# Patient Record
Sex: MALE | Race: WHITE | URBAN - METROPOLITAN AREA
[De-identification: names, ages, dates, MRNs, and addresses within clinical notes are randomized per-mention and may not be internally consistent; named-entity substitution may affect disease eponyms.]

---

## 2017-04-26 ENCOUNTER — HOSPITAL ENCOUNTER (EMERGENCY)
Facility: CLINIC | Age: 32
Discharge: HOME | End: 2017-04-26
Attending: FAMILY MEDICINE | Admitting: FAMILY MEDICINE
Payer: COMMERCIAL

## 2017-04-26 VITALS
HEART RATE: 77 BPM | WEIGHT: 150.4 LBS | SYSTOLIC BLOOD PRESSURE: 140 MMHG | OXYGEN SATURATION: 97 % | DIASTOLIC BLOOD PRESSURE: 80 MMHG | RESPIRATION RATE: 16 BRPM | TEMPERATURE: 97.4 F

## 2017-04-26 DIAGNOSIS — F11.20 UNCOMPLICATED OPIOID DEPENDENCE (H): ICD-10-CM

## 2017-04-26 DIAGNOSIS — F41.9 ANXIETY: ICD-10-CM

## 2017-04-26 LAB
AMPHETAMINES UR QL SCN: NORMAL
BARBITURATES UR QL: NORMAL
BENZODIAZ UR QL: NORMAL
CANNABINOIDS UR QL SCN: NORMAL
COCAINE UR QL: NORMAL
ETHANOL UR QL SCN: NORMAL
OPIATES UR QL SCN: NORMAL

## 2017-04-26 PROCEDURE — 80320 DRUG SCREEN QUANTALCOHOLS: CPT | Performed by: EMERGENCY MEDICINE

## 2017-04-26 PROCEDURE — 99285 EMERGENCY DEPT VISIT HI MDM: CPT | Mod: 25

## 2017-04-26 PROCEDURE — 99284 EMERGENCY DEPT VISIT MOD MDM: CPT | Mod: Z6 | Performed by: FAMILY MEDICINE

## 2017-04-26 PROCEDURE — 80307 DRUG TEST PRSMV CHEM ANLYZR: CPT | Performed by: EMERGENCY MEDICINE

## 2017-04-26 PROCEDURE — 90791 PSYCH DIAGNOSTIC EVALUATION: CPT

## 2017-04-26 RX ORDER — BUPRENORPHINE AND NALOXONE 4; 1 MG/1; MG/1
1 FILM, SOLUBLE BUCCAL; SUBLINGUAL DAILY
COMMUNITY
End: 2017-09-14

## 2017-04-26 RX ORDER — CITALOPRAM HYDROBROMIDE 20 MG/1
TABLET ORAL
Qty: 30 TABLET | Refills: 0 | Status: SHIPPED | OUTPATIENT
Start: 2017-04-26 | End: 2017-09-14

## 2017-04-26 NOTE — DISCHARGE INSTRUCTIONS
Discharged to home starting Celexa as discussed plan to follow up with the outpatient MI/CD day treatment program they will contact you otherwise contact outpatient services at [phone]

## 2017-04-26 NOTE — ED NOTES
I have had anxiety my whole life but worse these last 3mo. Paranoia, having incredible anxiety, like everything I am doing is being scrutinized to the point I am faking my behaviors to please people and not being natural anymore. Scared of everything and everyone. On suboxone for opiate addiction. Pt calm and cooperative, appears anxious.

## 2017-04-26 NOTE — ED AVS SNAPSHOT
[facility], [place], Emergency Department    [address]    [facility] [address]    Phone:  [phone]    Fax:  [phone]                                       Regan Quarles   MRN: [id number]    Department:  [facility], Emergency Department   Date of Visit:  4/26/2017           After Visit Summary Signature Page     I have received my discharge instructions, and my questions have been answered. I have discussed any challenges I see with this plan with the nurse or doctor.    ..........................................................................................................................................  Patient/Patient Representative Signature      ..........................................................................................................................................  Patient Representative Print Name and Relationship to Patient    ..................................................               ................................................  Date                                            Time    ..........................................................................................................................................  Reviewed by Signature/Title    ...................................................              ..............................................  Date                                                            Time

## 2017-04-26 NOTE — ED AVS SNAPSHOT
[facility], Emergency Department    [address]    [place] [address]    Phone:  [phone]    Fax:  [phone]                                       Regan Quarles   MRN: [id number]    Department:  [facility], Emergency Department   Date of Visit:  4/26/2017           Patient Information     Date Of Birth          1985        Your diagnoses for this visit were:     Anxiety     Uncomplicated opioid dependence (H)        You were seen by Stephan Brooks MD.      Follow-up Information     Follow up with Tejinder Wright.    Specialty:  Internal Medicine    Why:  As needed    Contact information:    [facility]  [address]  [facility] [address]  [phone]          Discharge Instructions       Discharged to home starting Celexa as discussed plan to follow up with the outpatient MI/CD day treatment program they will contact you otherwise contact outpatient services at [phone]    24 Hour Appointment Hotline       To make an appointment at any [facility] clinic, call [phone] ([phone]). If you don't have a family doctor or clinic, we will help you find one. [facility] clinics are conveniently located to serve the needs of you and your family.             Review of your medicines      START taking        Dose / Directions Last dose taken    citalopram 20 MG tablet   Commonly known as:  celeXA   Quantity:  30 tablet        Take 1/2 tablet (10 mg) for 1-2 weeks, then increase to 1 tablet orally daily   Refills:  0          Our records show that you are taking the medicines listed below. If these are incorrect, please call your family doctor or clinic.        Dose / Directions Last dose taken    buprenorphine HCl-naloxone HCl 4-1 MG per film   Commonly known as:  SUBOXONE   Dose:  1 Film        Place 1 Film under the tongue daily   Refills:  0        BUSPAR PO   Dose:  10 mg        Take 10 mg by mouth daily   Refills:  0                Prescriptions  "were sent or printed at these locations (1 Prescription)                   Other Prescriptions                Printed at Department/Unit printer (1 of 1)         citalopram (CELEXA) 20 MG tablet                Procedures and tests performed during your visit     Drug abuse screen 6 urine (tox)      Orders Needing Specimen Collection     None      Pending Results     No orders found from 2017 to 2017.            Pending Culture Results     No orders found from 2017 to 2017.            Thank you for choosing [facility]       Thank you for choosing [facility] for your care. Our goal is always to provide you with excellent care. Hearing back from our patients is one way we can continue to improve our services. Please take a few minutes to complete the written survey that you may receive in the mail after you visit with us. Thank you!        LocalEatshart Information     Synesis lets you send messages to your doctor, view your test results, renew your prescriptions, schedule appointments and more. To sign up, go to [email or web address] . Click on \"Log in\" on the left side of the screen, which will take you to the Welcome page. Then click on \"Sign up Now\" on the right side of the page.     You will be asked to enter the access code listed below, as well as some personal information. Please follow the directions to create your username and password.     Your access code is: [email or web address]  Expires: 2017  6:02 PM     Your access code will  in 90 days. If you need help or a new code, please call your [facility] clinic or [phone].        Care EveryWhere ID     This is your Care EveryWhere ID. This could be used by other organizations to access your [facility] medical records  [id number]        After Visit Summary       This is your record. Keep this with you and show to your community pharmacist(s) and doctor(s) at your next visit.                  "

## 2017-04-27 ENCOUNTER — TELEPHONE (OUTPATIENT)
Dept: BEHAVIORAL HEALTH | Facility: CLINIC | Age: 32
End: 2017-04-27

## 2017-04-27 NOTE — TELEPHONE ENCOUNTER
D: Pt was referred by ED/DEC. Unclear if referral was for DDP, PHP or Day Tx as all 3 were mentioned. Pt has anxiety disorder and SEVEN, currently sober on Suboxone maintenance.     I: Reviewed EHR. Contacted pt to discuss referral and further triage.    A: Pending. Pt plans to take leave from work initially and then may be able to work part time. Prefers DDP in terms of LOC and clinical focus.    P: Scheduled DA with José Manuel Tan for Tuesday 5/02/17 at 8:30 AM. Directions and Instructions provided for appt. Pt has writer's number and requested to call if he has further questions.    Quita Garcia, CHRISTINA, [facility]  4/27/2017

## 2017-04-30 ASSESSMENT — ENCOUNTER SYMPTOMS
NERVOUS/ANXIOUS: 1
ABDOMINAL PAIN: 0
SHORTNESS OF BREATH: 0
DYSPHORIC MOOD: 1
FEVER: 0

## 2017-05-01 ENCOUNTER — TELEPHONE (OUTPATIENT)
Dept: BEHAVIORAL HEALTH | Facility: CLINIC | Age: 32
End: 2017-05-01

## 2017-05-01 NOTE — TELEPHONE ENCOUNTER
D: Pt called to report that he has a couple of appts set up with an individual therapist and another appt with an Addictions Therapist. Pt wondered if he should try that and then if he is struggling, inquired if he could re-refer himsef to DDP.     I: Explained without an assessment it is difficult for writer to determine if less intensive Tx would provide him with what he needs right now. Encouraged patient to follow recommendations of his providers.     A: Cancelled DA for tomorrow per pt request.    P: Pt will call writer directly if he decides to pursue Tx in the future. Informed patient that he, and his providers, can call [facility] to make a referral too.    Quita Garcia, CHRISTINA, [facility]  5/1/2017

## 2017-05-01 NOTE — ED PROVIDER NOTES
History     Chief Complaint   Patient presents with     Paranoid     Anxiety     HPI  Regan Quarles is a 31 year old male who presents to emergency room with ongoing increasing anxiety patient was having lots of paranoid thoughts at work today has noted increased anxiety.  Patient states that he has had struggles with anxiety most of his life but has increased in intensity in the last 3 months and using Suboxone on a regular basis but notes that he has a history of opiate dependence he continues to use BuSpar on an as needed basis by his report.  Patient denies any suicidal ideation but states that he feels as though his anxiety seems to be increasing in its intensity.  Patient is not currently on any SSRIs.  Patient is open to the idea of outpatient treatment.    I have reviewed the Medications, Allergies, Past Medical and Surgical History, and Social History in the Epic system.    PERSONAL MEDICAL HISTORY  Past Medical History:   Diagnosis Date     Opioid dependence (H)      PAST SURGICAL HISTORY  History reviewed. No pertinent surgical history.  FAMILY HISTORY  No family history on file.  SOCIAL HISTORY  Social History   Substance Use Topics     Smoking status: Current Every Day Smoker     Smokeless tobacco: Not on file     Alcohol use Yes      Comment: Very occ     MEDICATIONS  No current facility-administered medications for this encounter.      Current Outpatient Prescriptions   Medication     buprenorphine HCl-naloxone HCl (SUBOXONE) 4-1 MG per film     BusPIRone HCl (BUSPAR PO)     citalopram (CELEXA) 20 MG tablet     ALLERGIES  Allergies   Allergen Reactions     Seasonal Allergies          Review of Systems   Constitutional: Negative for fever.   Respiratory: Negative for shortness of breath.    Cardiovascular: Negative for chest pain.   Gastrointestinal: Negative for abdominal pain.   Psychiatric/Behavioral: Positive for dysphoric mood. Negative for suicidal ideas. The patient is nervous/anxious.     All other systems reviewed and are negative.      Physical Exam   BP: 137/88  Pulse: 77  Heart Rate: 71  Temp: 97.4  F (36.3  C)  Resp: 16  Weight: 68.2 kg (150 lb 6.4 oz)  SpO2: 96 %  Physical Exam   Constitutional: He is oriented to person, place, and time. No distress.   HENT:   Head: Atraumatic.   Mouth/Throat: Oropharynx is clear and moist. No oropharyngeal exudate.   Eyes: Pupils are equal, round, and reactive to light. No scleral icterus.   Cardiovascular: Normal heart sounds and intact distal pulses.    Pulmonary/Chest: Breath sounds normal. No respiratory distress.   Abdominal: Soft. Bowel sounds are normal. There is no tenderness.   Musculoskeletal: He exhibits no edema or tenderness.   Neurological: He is alert and oriented to person, place, and time. He has normal reflexes. He displays normal reflexes. No cranial nerve deficit. He exhibits normal muscle tone. Coordination normal.   Skin: Skin is warm. No rash noted. He is not diaphoretic.   Psychiatric: His mood appears anxious. He exhibits a depressed mood. He expresses no suicidal ideation.       ED Course     ED Course     Procedures     Patient was seen by the  please refer to their report.      Critical Care time:  none     Labs Ordered and Resulted from Time of ED Arrival Up to the Time of Departure from the ED   DRUG ABUSE SCREEN 6 CHEM DEP URINE ([facility])         Assessments & Plan (with Medical Decision Making)         I have reviewed the nursing notes.    I have reviewed the findings, diagnosis, plan and need for follow up with the patient.  Patient with ongoing severe anxiety.  At this time he was seen and evaluated by the  please refer to the recommendation as well we discussed the possibility of starting her medications he will be started on Mrs. CASIMIRO Saleh he also is going to be referred to day treatment program for more prolonged evaluation and treatment patient understands the importance of follow-up and will return if he  has any thoughts of suicide or harming himself.    Discharge Medication List as of 4/26/2017  6:02 PM      START taking these medications    Details   citalopram (CELEXA) 20 MG tablet Take 1/2 tablet (10 mg) for 1-2 weeks, then increase to 1 tablet orally daily, Disp-30 tablet, R-0, Local Print             Final diagnoses:   Anxiety   Uncomplicated opioid dependence (H)       4/26/2017   [facility], [place], EMERGENCY DEPARTMENT     Stephan Brooks MD  04/30/17 2100

## 2017-06-27 ENCOUNTER — HOSPITAL ENCOUNTER (EMERGENCY)
Facility: CLINIC | Age: 32
Discharge: HOME | End: 2017-06-27
Attending: FAMILY MEDICINE | Admitting: FAMILY MEDICINE
Payer: COMMERCIAL

## 2017-06-27 VITALS
DIASTOLIC BLOOD PRESSURE: 81 MMHG | RESPIRATION RATE: 16 BRPM | SYSTOLIC BLOOD PRESSURE: 144 MMHG | HEART RATE: 96 BPM | WEIGHT: 154.25 LBS | OXYGEN SATURATION: 99 % | TEMPERATURE: 99.1 F

## 2017-06-27 DIAGNOSIS — F15.10 AMPHETAMINE ABUSE (H): ICD-10-CM

## 2017-06-27 DIAGNOSIS — F41.9 ANXIETY: ICD-10-CM

## 2017-06-27 LAB
AMPHETAMINES UR QL SCN: ABNORMAL
BARBITURATES UR QL: ABNORMAL
BENZODIAZ UR QL: ABNORMAL
CANNABINOIDS UR QL SCN: ABNORMAL
COCAINE UR QL: ABNORMAL
ETHANOL UR QL SCN: ABNORMAL
OPIATES UR QL SCN: ABNORMAL

## 2017-06-27 PROCEDURE — 80320 DRUG SCREEN QUANTALCOHOLS: CPT | Mod: 59 | Performed by: EMERGENCY MEDICINE

## 2017-06-27 PROCEDURE — 99285 EMERGENCY DEPT VISIT HI MDM: CPT | Mod: 25

## 2017-06-27 PROCEDURE — 90791 PSYCH DIAGNOSTIC EVALUATION: CPT

## 2017-06-27 PROCEDURE — 80307 DRUG TEST PRSMV CHEM ANLYZR: CPT | Performed by: EMERGENCY MEDICINE

## 2017-06-27 PROCEDURE — 99284 EMERGENCY DEPT VISIT MOD MDM: CPT | Mod: Z6 | Performed by: FAMILY MEDICINE

## 2017-06-27 NOTE — SUMMARY OF CARE
"Pt seeking clarification as to \"why am I here?  I don't even know how I got here or what grounds you have for keeping me here.\"    "

## 2017-06-27 NOTE — ED AVS SNAPSHOT
[facility], Emergency Department    [address]    [place] [address]    Phone:  [phone]    Fax:  [phone]                                       Regan Quarles   MRN: [id number]    Department:  [facility], Emergency Department   Date of Visit:  6/27/2017           Patient Information     Date Of Birth          1985        Your diagnoses for this visit were:     Anxiety     Amphetamine abuse        You were seen by Stephan Brooks MD.      Follow-up Information     Follow up with Tejinder Wright.    Specialty:  Internal Medicine    Why:  As needed    Contact information:    [facility]  [address]  [place] [address]  [phone]          Discharge Instructions       Discharged to home with plan to stop amphetamine use follow-up with your outpatient therapist continue with outpatient therapy at this time pursue MI/CD if not improving.    24 Hour Appointment Hotline       To make an appointment at any [facility] clinic, call [phone] ([phone]). If you don't have a family doctor or clinic, we will help you find one. [facility] clinics are conveniently located to serve the needs of you and your family.             Review of your medicines      Our records show that you are taking the medicines listed below. If these are incorrect, please call your family doctor or clinic.        Dose / Directions Last dose taken    buprenorphine HCl-naloxone HCl 4-1 MG per film   Commonly known as:  SUBOXONE   Dose:  1 Film        Place 1 Film under the tongue daily Reports taking 3mg   Refills:  0        BUSPAR PO   Dose:  40 mg        Take 40 mg by mouth daily   Refills:  0        citalopram 20 MG tablet   Commonly known as:  celeXA   Quantity:  30 tablet        Take 1/2 tablet (10 mg) for 1-2 weeks, then increase to 1 tablet orally daily   Refills:  0                Procedures and tests performed during your visit     Drug abuse screen 6 urine (tox)     "  Orders Needing Specimen Collection     None      Pending Results     No orders found from 2017 to 2017.            Pending Culture Results     No orders found from 2017 to 2017.            Pending Results Instructions     If you had any lab results that were not finalized at the time of your Discharge, you can call the ED Lab Result RN at [phone]. You will be contacted by this team for any positive Lab results or changes in treatment. The nurses are available 7 days a week from 10A to 6:30P.  You can leave a message 24 hours per day and they will return your call.        Thank you for choosing [facility]       Thank you for choosing [facility] for your care. Our goal is always to provide you with excellent care. Hearing back from our patients is one way we can continue to improve our services. Please take a few minutes to complete the written survey that you may receive in the mail after you visit with us. Thank you!        [email or web address] Information     [email or web address] lets you send messages to your doctor, view your test results, renew your prescriptions, schedule appointments and more. To sign up, go to [email or web address] . Click on \"Log in\" on the left side of the screen, which will take you to the Welcome page. Then click on \"Sign up Now\" on the right side of the page.     You will be asked to enter the access code listed below, as well as some personal information. Please follow the directions to create your username and password.     Your access code is: [email or web address]  Expires: 2017  6:02 PM     Your access code will  in 90 days. If you need help or a new code, please call your [facility] clinic or [phone].        Care EveryWhere ID     This is your Care EveryWhere ID. This could be used by other organizations to access your [facility] medical records  [id number]        Equal Access to Services     PEDRO BULLOCK AH: Geovani Hidalgo, dipesh guzmán, lidia chang " trudi fang ah. So [facility] [phone].    ATENCIÓN: Si habla español, tiene a harrison disposición servicios gratuitos de asistencia lingüística. Llame al [phone].    We comply with applicable federal civil rights laws and [place] laws. We do not discriminate on the basis of race, color, national origin, age, disability sex, sexual orientation or gender identity.            After Visit Summary       This is your record. Keep this with you and show to your community pharmacist(s) and doctor(s) at your next visit.

## 2017-06-27 NOTE — ED AVS SNAPSHOT
[facility], [place], Emergency Department    [address]    [facility] [address]    Phone:  [phone]    Fax:  [phone]                                       Regan Quarles   MRN: [id number]    Department:  [facility], Emergency Department   Date of Visit:  6/27/2017           After Visit Summary Signature Page     I have received my discharge instructions, and my questions have been answered. I have discussed any challenges I see with this plan with the nurse or doctor.    ..........................................................................................................................................  Patient/Patient Representative Signature      ..........................................................................................................................................  Patient Representative Print Name and Relationship to Patient    ..................................................               ................................................  Date                                            Time    ..........................................................................................................................................  Reviewed by Signature/Title    ...................................................              ..............................................  Date                                                            Time

## 2017-06-27 NOTE — ED NOTES
Bed: ED10  Expected date: 6/27/17  Expected time: 11:45 AM  Means of arrival:   Comments:  WBL  33yo M psych eval

## 2017-06-27 NOTE — ED PROVIDER NOTES
History     Chief Complaint   Patient presents with     Addiction Problem     mother called 911 for unusual behaviors, told police she thought he was using substances again, patient denies     Hallucinations     per police hold paper , has not slept in 2 days, having auditory and visual hallucinations     HPI  Regan Quarles is a 31 year old male who presents to emergency room with ongoing problems with increased unusual behaviors per family report.  Patient admits to having ongoing methamphetamine use at this time.  Past history includes opiate dependence and depression and anxiety patient is not interested in chemical dependency treatment at this time but is aware of the possibility of outpatient MI CD program.  Patient denies any suicidal ideation no immediate threat to himself or others.    I have reviewed the Medications, Allergies, Past Medical and Surgical History, and Social History in the Epic system.    Review of Systems   Constitutional: Negative for fever.   Respiratory: Negative for shortness of breath.    Cardiovascular: Negative for chest pain.   Gastrointestinal: Negative for abdominal pain.   Psychiatric/Behavioral: Positive for dysphoric mood. Negative for suicidal ideas. The patient is nervous/anxious.    All other systems reviewed and are negative.      Physical Exam   BP: (!) 130/98  Pulse: 124  Temp: 99.1  F (37.3  C)  Resp: 16  Weight: 70 kg (154 lb 4 oz)  SpO2: 94 %  Physical Exam   Constitutional: He is oriented to person, place, and time. No distress.   HENT:   Head: Atraumatic.   Mouth/Throat: Oropharynx is clear and moist. No oropharyngeal exudate.   Eyes: Pupils are equal, round, and reactive to light. No scleral icterus.   Cardiovascular: Normal heart sounds and intact distal pulses.    Pulmonary/Chest: Breath sounds normal. No respiratory distress.   Abdominal: Soft. Bowel sounds are normal. There is no tenderness.   Musculoskeletal: He exhibits no edema or tenderness.    Neurological: He is alert and oriented to person, place, and time. He has normal reflexes. No cranial nerve deficit. He exhibits normal muscle tone. Coordination normal.   Skin: Skin is warm. No rash noted. He is not diaphoretic.   Psychiatric: His mood appears anxious. He exhibits a depressed mood. He expresses no suicidal ideation.       ED Course     ED Course     Procedures     Patient was seen by the  please refer to their report in the notes section of the Epic chart dated 6/27/17      Critical Care time:  none      Labs Ordered and Resulted from Time of ED Arrival Up to the Time of Departure from the ED   DRUG ABUSE SCREEN 6 CHEM DEP URINE ([facility]) - Abnormal; Notable for the following:        Result Value    Amphetamine Qual Urine   (*)     Value: Positive   Cutoff for a positive amphetamine is greater than 500 ng/mL. This is an   unconfirmed screening result to be used for medical purposes only.      All other components within normal limits            Assessments & Plan (with Medical Decision Making)       I have reviewed the nursing notes.    I have reviewed the findings, diagnosis, plan and need for follow up with the patient.  Patient with ongoing anxiety as well as amphetamine abuse at this time will be discharged to home with plans to follow up with outpatient MI CD program or return if any increase in thoughts of harming self or others.    New Prescriptions    No medications on file       Final diagnoses:   Anxiety   Amphetamine abuse       6/27/2017   [facility], [place], EMERGENCY DEPARTMENT     Stephan Brooks MD  06/29/17 6890

## 2017-06-29 ENCOUNTER — TELEPHONE (OUTPATIENT)
Dept: BEHAVIORAL HEALTH | Facility: CLINIC | Age: 32
End: 2017-06-29

## 2017-06-29 ASSESSMENT — ENCOUNTER SYMPTOMS
DYSPHORIC MOOD: 1
SHORTNESS OF BREATH: 0
ABDOMINAL PAIN: 0
FEVER: 0
NERVOUS/ANXIOUS: 1

## 2017-06-29 NOTE — TELEPHONE ENCOUNTER
Received a fax and fule 25 from Anitha Glynn, [facility], [phone], without a prior phone call.  Looks like a MURIEL on the last page.  Referring to [facility]  Did not think withdrawal would be a concern for this client. Benefits requested.  Faxed to [facility]staff for refiew.

## 2017-07-10 NOTE — TELEPHONE ENCOUNTER
Pt calls for involvement in lodging.  Discussed with Linda/Irena how to proceed as pw was sent for screening on 6-29-17.  Linda requests full eval as she had multiple questions for screening.  Pt agrees, appt secured for 7-17-17 / [place]

## 2017-07-17 NOTE — TELEPHONE ENCOUNTER
Linda in cd evaluation recommending dual diagnosis day treatment.  See [facility] for clinical background.  Inbasket to program to schedule diagnostic evaluation

## 2017-07-17 NOTE — TELEPHONE ENCOUNTER
7/17/2017  I met briefly with pt who is interested in the Dual Diagnosis Program that is 5 days a week rather than a 3 day a week co-occurring program.  He was not interested in LP at this time. I cancelled our appointment and referred him to DDP for an assessment.

## 2017-07-19 ENCOUNTER — BEH TREATMENT PLAN (OUTPATIENT)
Dept: BEHAVIORAL HEALTH | Facility: CLINIC | Age: 32
End: 2017-07-19
Attending: PSYCHIATRY & NEUROLOGY

## 2017-07-19 ENCOUNTER — HOSPITAL ENCOUNTER (OUTPATIENT)
Dept: BEHAVIORAL HEALTH | Facility: CLINIC | Age: 32
Discharge: HOME | End: 2017-07-19
Attending: PSYCHIATRY & NEUROLOGY | Admitting: PSYCHIATRY & NEUROLOGY
Payer: COMMERCIAL

## 2017-07-19 PROCEDURE — 90791 PSYCH DIAGNOSTIC EVALUATION: CPT | Performed by: PSYCHOLOGIST

## 2017-07-19 ASSESSMENT — PAIN SCALES - GENERAL: PAINLEVEL: NO PAIN (0)

## 2017-07-19 NOTE — PROGRESS NOTES
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 reports he has no S/I, denies a hx of S/I. Pt reports he has \"a lot\" of trouble with concentration. He reports memory is OK. Pt reports he has been eating a lot. He has gained 5 pounds over the past 3 weeks. Pt reports he sleeps 9 hours a night.  Pt reports his energy is variable, low yesterday. Pt reports he lacks motivation. Pt reports irritability, feels tense, uneasy in crowds, impulsiveness, and worry.    Pt reports he has a physician following him for suboxone-Dr Lina IVY. He sees a therapist also. He denies having a psychiatrist. He has has stopped all of his mental health meds. He is open to recommendations to take other medications from our physician.     Pt denies cultural issues that would impact treatment. He denies medical concerns.     Pt reports his strengths are: compassionate, loyal, resourceful.     Onset/Duration/Pattern of Symptoms noted above: since mid teens     Regan reports the following understanding of his diagnosis: \"I have no official dx\"      Prognosis is Guarded. Without the recommended intervention, the client is likely to experience the following consequences of their symptoms: " Pt will need residential treatment without MICD IOP.    Referrals to services required by statute or rule:   Report to child/adult protection services was NA.     Program Recommendation: MI/CD Treatment (MICD) .      Assessment Completed by: Miguel Tan

## 2017-07-19 NOTE — PROGRESS NOTES
"Initial Individual Treatment Plan     Patient: Regan Quarles   MRN: [id number]  : 1985  Age: 32 year old  Sex: male    Diagnostic Assessment Date / Date of Initial Individual Treatment Plan: 17      Immediate Health Concerns:  No     Immediate Safety Concerns:  No    Identify the issues to be addressed in treatment:  Symptom Management, Community Resources/Discharge Planning, Abstinence/Relapse Prevention, Develop / Improve Independent Living Skills and Develop Socialization / Interpersonal Relationship Skills    Client Initial Individualized Goals for Treatment: \"hope to get some firm dx, work in direction to figure out what is motivating the problems in my life...why I keep doing the same cycles over and over.\"      Initial Treatment suggestions for the client during the time between Diagnostic Assessment and completion of the Individualized Treatment Plan:  Abstain from Substance Use   Ask for more information, support and/or assistance as needed.  Follow up with providers/community supports as needed:   Report increases or changes in symptoms to staff.  Report any personal safety concerns to staff.   Take medications as prescribed.  Report medication changes and/or side effects to staff.  Attend and participate in groups as scheduled or notify staff if unable to do so.  Report any use of substances to staff as this may impact your symptoms and/or  personal safety.  Notify staff if you have any other issues that need to be addressed. This may include  any current abuse / neglect / exploitation or other vulnerability.  Follow recommendations of your treatment team and discuss concerns if not in  agreement.     Treatment Team Responsible: MI/CD Treatment (MICD)      Therapeutic Interventions/Treatment Strategies may include:  Support, Redirection, Feedback, Limit/Boundaries, Safety Assessments, Structured Activity, Problem Solving, Clarification, Education, Motivational Enhancement and Relapse " Prevention as needed.    Miguel Tan        Admission Date: 7/25/2017    The Initial Individual Treatment Plan was reviewed with Regan Quarles upon admission.      Regan reported his last use of substances as: 2.5 weeks ago    Identify any current concerns with potential to impact admission:     withdrawal/intoxication: No concerns     medication/medical concerns: No concerns     immediate safety concerns: No concerns     Other: No other concerns    DIMENSION  ADMIT RATING   1 Acute Intoxication / Withdrawal Potential 0   2 Biomedical Conditions & Complications 0   3 Emotional / Behavioral / Cognitive Conditions & Complications 2   4 Treatment Acceptance / Resistance: 1   5 Continued Use / Relapse Prevention 3   6 Recovery Environment 2         Completed by: Miguel Tan MA [facility]

## 2017-07-19 NOTE — TELEPHONE ENCOUNTER
----- Message from Miguel Tan LP sent at 7/19/2017 10:16 AM CDT -----  Regarding: start MICD  Mary      Pt has a planned start in the 9am track of MICD IOP on 7/24/17.    Thank you,    Miguel Tan MA, LP [facility]

## 2017-07-19 NOTE — PROGRESS NOTES
Acknowledgement of Current Treatment Plan       I have reviewed my treatment plan with my therapist / counselor on 7/19/2017. I agree with the plan as it is written in the electronic health record.    Name Signature   Regan Quarles    Name of Therapist / Counselor    Miguel Tan MA [facility]

## 2017-07-24 ENCOUNTER — TELEPHONE (OUTPATIENT)
Dept: BEHAVIORAL HEALTH | Facility: CLINIC | Age: 32
End: 2017-07-24

## 2017-07-24 NOTE — TELEPHONE ENCOUNTER
Phoned pt, he reports transportation problem today, but reports he can get here daily the rest of the week.. He plans to start 7/25 9am track.

## 2017-07-25 ENCOUNTER — HOSPITAL ENCOUNTER (OUTPATIENT)
Dept: BEHAVIORAL HEALTH | Facility: CLINIC | Age: 32
End: 2017-07-25
Attending: PSYCHIATRY & NEUROLOGY
Payer: COMMERCIAL

## 2017-07-25 VITALS — WEIGHT: 155 LBS | BODY MASS INDEX: 23.49 KG/M2 | HEIGHT: 68 IN

## 2017-07-25 PROBLEM — F33.1 MAJOR DEPRESSIVE DISORDER, RECURRENT EPISODE, MODERATE (H): Status: ACTIVE | Noted: 2017-07-25

## 2017-07-25 PROCEDURE — H2012 BEHAV HLTH DAY TREAT, PER HR: HCPCS

## 2017-07-25 ASSESSMENT — ANXIETY QUESTIONNAIRES
IF YOU CHECKED OFF ANY PROBLEMS ON THIS QUESTIONNAIRE, HOW DIFFICULT HAVE THESE PROBLEMS MADE IT FOR YOU TO DO YOUR WORK, TAKE CARE OF THINGS AT HOME, OR GET ALONG WITH OTHER PEOPLE: VERY DIFFICULT
7. FEELING AFRAID AS IF SOMETHING AWFUL MIGHT HAPPEN: SEVERAL DAYS
1. FEELING NERVOUS, ANXIOUS, OR ON EDGE: NEARLY EVERY DAY
3. WORRYING TOO MUCH ABOUT DIFFERENT THINGS: NEARLY EVERY DAY
2. NOT BEING ABLE TO STOP OR CONTROL WORRYING: SEVERAL DAYS
6. BECOMING EASILY ANNOYED OR IRRITABLE: NEARLY EVERY DAY
GAD7 TOTAL SCORE: 15
5. BEING SO RESTLESS THAT IT IS HARD TO SIT STILL: MORE THAN HALF THE DAYS

## 2017-07-25 ASSESSMENT — PATIENT HEALTH QUESTIONNAIRE - PHQ9: 5. POOR APPETITE OR OVEREATING: MORE THAN HALF THE DAYS

## 2017-07-25 NOTE — PROGRESS NOTES
"MICD Progress Note / Treatment Plan Review       Patient: Regan Quarles   MRN: [id number]  : 1985  Age: 32 year old  Sex: male    Week of: 17-17    Client Initial Individualized Goals for Treatment:  \"hope to get some firm dx, work in direction to figure out what is motivating the problems in my life...why I keep doing the same cycles over and over.\"     See Initial Treatment suggestions for the client during the time between Diagnostic Assessment and completion of the Master Individualized Treatment Plan.    Treatment Goals:  Treatment plan will be formulated on 17.      Active Psychiatric Symptoms Impairing:   Thought, Mood, Behavior and Perception    Area of Treatment Focus:  Symptom Management, Personal Safety, Community Resources/Discharge Planning and Abstinence/Relapse Prevention    Treatment Strategies:   Support, Feedback, Limit/Boundaries, Safety Assessments, Structured Activity, Problem Solving, Clarification, Education, Motivational Enhancement Therapy and Cognitive Behavioral Therapy    Patient Response:  Listened, Attentive and Alert    Dimension Risk Description and Outcome:    Dimension One: Acute Intoxication/Withdrawal Potential   Daily Note:  17:  Rajinder reports that he has not used chemicals in a little over 2 weeks.  (RAJNI)    Dimension Two: Biomedical Conditions and Complications  Daily Note: 17:  No physical concerns reported.  (RAJNI)    Dimension Three: Emotional/Behavioral / Cognitive Conditions & Complications  Daily Note: 17:  Rajinder introduced himself to the group and explained that he is feeling \"uncomfortable, but hopeful\" about being in treatment.  He states that he would just like to observe psychotherapy as he feels \"nervous\" about being here.  He denies thoughts to self harm.  (RAJNI)    Dimension Four: Treatment Acceptance / Resistance  Daily Note: 17:  First day of treatment.  Minimally participated in the group discussion.  (RAJNI)    Dimension " Five: Continued Use/Relapse Prevention  Daily Note: 7/25/17: High risk for relapse.  (RAJNI)    Dimension Six: Recovery Environment  Daily Note: 7/25/17:  Minimal sober support in the community. (RAJNI)      Weekly Progress / Treatment Plan Review      Are there additional progress notes for this week? Yes (see additional progress notes)    Are Treatment Plan Goals being addressed? Treatment plan has not yet been formulated.      Are treatment plan strategies to address goals effective? N/A    Are there any current contracts in place? No    Client: N/A     Client: N/A    Was client case reviewed with Dr Burch and/or Dr Garg and the treatment team this week? no    Staff: CHRISTINA Cardoso(RAJNI)

## 2017-07-25 NOTE — PROGRESS NOTES
RN Review of Medical History / Physical Health Screen  Outpatient Behavioral Programs      CLIENT'S NAME: Regan Quarles  MRN:   [id number]  :   1985 AGE:32 year old SEX: male    DATE OF DIAGNOSTIC ASSESSMENT: 17  DATE OF ADMISSION: 17   PROGRAM: MI/CD Treatment (MICD)      Following admission, the RN reviewed the following:    - Medical History / Physical Health Screen completed during the DA noted above.  - Immediate Health Concerns as noted on the Initial Individual Treatment Plan.    Client height and weight recorded by RN in epic: yes    BMI Review:  Was the patient informed of BMI? yes      Findings Normal, No Intervention       RN Recommendations include: Continue to educate on nutrition and exercise. Educate on the importance to monitor regularly and if increase continues follow up with primary provider.        Boy Jordan  2017

## 2017-07-25 NOTE — PROGRESS NOTES
"  Adult Mental Health Outpatient Group Therapy Progress Note     Client Initial Individualized Goals for Treatment: \"hope to get some firm dx, work in direction to figure out what is motivating the problems in my life...why I keep doing the same cycles over and over.\"        See Initial Treatment suggestions for the client during the time between Diagnostic Assessment and completion of the Master Individualized Treatment Plan.    Treatment Goals:     see above     Area of Treatment Focus:  Symptom Management and Abstinence/Relapse Prevention    Therapeutic Interventions/Treatment Strategies:  Support, Feedback, Structured Activity, Problem Solving, Clarification and Education    Response to Treatment Strategies:  Accepted Feedback, Listened, Focused on Goals, Attentive and Accepted Support    Name of Group:  Self Support Skills     Description and Outcome:  Rajinder participated in stress management topic of negative and positive coping skills. It was his first group, starting the program this date. He easily shared and noted some examples of negative skills that he uses for management and what the \"high cost\" is for himself. He was able to utilized a list of positive coping skills to identify a few skills he has been trying to use and several new ones that he would like to add to his repertoire.    Is this a Weekly Review of the Progress on the Treatment Plan?  No          "

## 2017-07-25 NOTE — PROGRESS NOTES
"Adult Mental Health   Dual Diagnosis Program   Progress Note     Client Initial Individualized Goals for Treatment:  \"hope to get some firm dx, work in direction to figure out what is motivating the problems in my life...why I keep doing the same cycles over and over.\"       See Initial Treatment suggestions for the client during the time between Diagnostic Assessment and completion of the Master Individualized Treatment Plan.    Treatment Goals:     Abstain from Substance Use   Ask for more information, support and/or assistance as needed.  Follow up with providers/community supports as needed:   Report increases or changes in symptoms to staff.  Report any personal safety concerns to staff.   Take medications as prescribed.  Report medication changes and/or side effects to staff.  Attend and participate in groups as scheduled or notify staff if unable to do so.  Report any use of substances to staff as this may impact your symptoms and/or                      personal safety.  Notify staff if you have any other issues that need to be addressed. This may include              any current abuse / neglect / exploitation or other vulnerability.  Follow recommendations of your treatment team and discuss concerns if not in            agreement.       Area of Treatment Focus:  Symptom Management, Develop / Improve Independent Living Skills, Develop Socialization / Interpersonal Relationship Skills, Cultural / Spirituality and Physical Health     Therapeutic Interventions/Treatment Strategies:  Support, Structured Activity and Education    Response to Treatment Strategies:  Accepted Feedback, Listened and Alert    Name of Group:  Balance     Description and Outcome:  Group is designed to explore the components of the wellness wheel. The various areas were broken down, explained and patient was to give an example on how to provide each kind of wellness. After discussion, group members were instructed to make a collage out " of magazines or drawings that illustrated the ways  they  experience each type of wellness.    Assessment  Mood: anxious behavior, range in affect, stable mood throughout group  Thought Process: Linear and logical, productive and goal directed  Behavior: Cooperative, interacted within the group, listened and was respectful of others      Is this a Weekly Review of the Progress on the Treatment Plan?  No

## 2017-07-26 ENCOUNTER — TELEPHONE (OUTPATIENT)
Dept: BEHAVIORAL HEALTH | Facility: CLINIC | Age: 32
End: 2017-07-26

## 2017-07-26 ASSESSMENT — ANXIETY QUESTIONNAIRES: GAD7 TOTAL SCORE: 15

## 2017-07-26 ASSESSMENT — PATIENT HEALTH QUESTIONNAIRE - PHQ9: SUM OF ALL RESPONSES TO PHQ QUESTIONS 1-9: 12

## 2017-07-26 NOTE — TELEPHONE ENCOUNTER
Received voicemail from patient stating he would be unable to attend program due to transportation issue.     Alena Rodriges, [facility], [facility]  7/26/2017

## 2017-07-28 ENCOUNTER — TELEPHONE (OUTPATIENT)
Dept: BEHAVIORAL HEALTH | Facility: CLINIC | Age: 32
End: 2017-07-28

## 2017-07-28 NOTE — TELEPHONE ENCOUNTER
MARTIKaren Spears did not attend treatment on this date and has not called to report his absence.  I.  Writer called Regan and left a message requesting a return call to discuss if he is planning to return to the program next week.  A.  Unable to assess.  P.  Monitor attendance.  Discharge if absences continue.  Naomi Mccoy, [facility]

## 2017-07-31 NOTE — PROGRESS NOTES
"MICD Progress Note / Treatment Plan Review       Patient: Regan Quarles   MRN: [id number]  : 1985  Age: 32 year old  Sex: male    Week of:  2017    Client Initial Individualized Goals for Treatment:  \"hope to get some firm dx, work in direction to figure out what is motivating the problems in my life...why I keep doing the same cycles over and over.\"     See Initial Treatment suggestions for the client during the time between Diagnostic Assessment and completion of the Master Individualized Treatment Plan.    Treatment Goals:    Treatment plan has not been formulated since Rajinder has only attended 1 day of treatment.  He has been discharged from the program.      Active Psychiatric Symptoms Impairing:   Thought, Mood, Behavior and Perception    Area of Treatment Focus:  Symptom Management, Personal Safety, Community Resources/Discharge Planning and Abstinence/Relapse Prevention    Treatment Strategies:   None utilized due to absence and discharge.      Patient Response:  Not applicable.     Dimension Risk Description and Outcome:    Dimension One: Acute Intoxication/Withdrawal Potential   Daily Note:  2017:  Unable to assess.  Regan has not returned to the program and has been discharged.  (RAJNI)    Dimension Two: Biomedical Conditions and Complications  Daily Note: 2017:  Unable to assess.  (RAJNI)    Dimension Three: Emotional/Behavioral / Cognitive Conditions & Complications  Daily Note:  2017:  Unable to assess.  (RAJNI)    Dimension Four: Treatment Acceptance / Resistance  Daily Note:  2017:  Unable to assess.  (RAJNI)    Dimension Five: Continued Use/Relapse Prevention  Daily Note:  2017:  Unable to assess.  (RAJNI)    Dimension Six: Recovery Environment  Daily Note:  2017:  Unable to assess.  (RAJNI)      Weekly Progress / Treatment Plan Review      Are there additional progress notes for this week? Yes (see additional progress notes)    Are Treatment Plan Goals being " addressed? Treatment plan was never formulated since Rjainder only attended 1 day of treatment.      Are treatment plan strategies to address goals effective? Client discharged    Are there any current contracts in place? No    Client: N/A     Client: D/C Instructions have been mailed to Rajinder.      Was client case reviewed with Dr Burch and/or Dr Garg and the treatment team this week? no    Staff: CHRISTINA Cardoso(RAJNI)

## 2017-07-31 NOTE — TELEPHONE ENCOUNTER
MARTI.  Rajinder continues to be absent from treatment and has not called to touch base with staff.  I.  Writer left a message informing Rajinder that he will be discharged from the program and encouraged him to call staff if he has questions.  A. Unable to assess.  P.  Discharge from program.  Naomi Mccoy, [facility]

## 2017-08-01 NOTE — DISCHARGE SUMMARY
"  Adult MICD Discharge Summary / Instructions Adult MICD Discharge Summary / Instruction     Patient: Regan Quarles MRN: [id number]  : 1985 Age:  32 year old Sex:  male    Admission Date: 17  Discharge Date: 17    Reason for Discharge:       Poor Attendance           Prognosis: Poor      Client Progress Toward AchievingTreatment Plan Goals / Dimensions Risk Scale       Regan attended 3 of 15 scheduled MICD groups. Absences were due to lack of transportation and \"no call/no show\".  He has been discharged from the program due poor attendance.     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  Treatment Plan was not formulated in such a short period of time.       Admit Discharge   PHQ-9 12 N/A   ECTOR-7 15 N/A       DIMENSION  ADMIT RATING DISCHARGE RATING   1 Acute Intoxication / Withdrawal Potential 0 0   2 Biomedical Conditions & Complications 0 0   3 Emotional / Behavioral / Cognitive Conditions & Complications 2 2   4 Treatment Acceptance / Resistance: 1 3   5 Continued Use / Relapse Prevention 3 3   6 Recovery Environment 2 2       Additional Comments: Rajinder has outpatient providers who monitor his medications and offer individual therapy.      Completed By:  CHRISTINA Cardoso          "

## 2017-08-15 ENCOUNTER — HOSPITAL ENCOUNTER (OUTPATIENT)
Dept: BEHAVIORAL HEALTH | Facility: CLINIC | Age: 32
Discharge: HOME | End: 2017-08-15
Attending: SOCIAL WORKER | Admitting: SOCIAL WORKER
Payer: COMMERCIAL

## 2017-08-15 VITALS
BODY MASS INDEX: 23.43 KG/M2 | HEART RATE: 75 BPM | DIASTOLIC BLOOD PRESSURE: 71 MMHG | HEIGHT: 68 IN | SYSTOLIC BLOOD PRESSURE: 131 MMHG | WEIGHT: 154.6 LBS

## 2017-08-15 PROCEDURE — H0001 ALCOHOL AND/OR DRUG ASSESS: HCPCS

## 2017-08-15 ASSESSMENT — ANXIETY QUESTIONNAIRES
GAD7 TOTAL SCORE: 11
6. BECOMING EASILY ANNOYED OR IRRITABLE: SEVERAL DAYS
3. WORRYING TOO MUCH ABOUT DIFFERENT THINGS: NEARLY EVERY DAY
7. FEELING AFRAID AS IF SOMETHING AWFUL MIGHT HAPPEN: SEVERAL DAYS
5. BEING SO RESTLESS THAT IT IS HARD TO SIT STILL: NOT AT ALL
1. FEELING NERVOUS, ANXIOUS, OR ON EDGE: NEARLY EVERY DAY
2. NOT BEING ABLE TO STOP OR CONTROL WORRYING: SEVERAL DAYS
4. TROUBLE RELAXING: MORE THAN HALF THE DAYS

## 2017-08-15 ASSESSMENT — PAIN SCALES - GENERAL: PAINLEVEL: NO PAIN (0)

## 2017-08-15 ASSESSMENT — PATIENT HEALTH QUESTIONNAIRE - PHQ9: SUM OF ALL RESPONSES TO PHQ QUESTIONS 1-9: 11

## 2017-08-15 NOTE — PROGRESS NOTES
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 evening for you. Work, school, social, leisure, volunteer, spiritual practices. Include time spent obtaining, using, recovering from drugs or alcohol. (DSM)     Using day: \"I use in the morning to get the day started, use at night after work or after everything is done. Then the day doesn't end. I get very little sleep. Erratic hours, Erratic everything.\"    Sober day: \"I wake up at 8:30am. If I work afternoon, I accomplish things in the morning. Pretty mundane. It's normal.\"    2B. How often do you spend more time than you planned using or use more than you planned? (DSM)     \"every time.\"    3. How important is using to your social connections? Do many of your family or friends use?     Most of his friends do not use.  Pt tends to isolate.    4A. Are you currently in a significant relationship?     No    4C. Sexual Orientation:     Heterosexual    5A. Who do you live with?      With his mom.    5B. Tell me about their alcohol/drug use and mental health issues.     Mom: no CD, anxiety and depression.    5C. Are you concerned for your safety there? No    5D. Are you concerned about the safety of anyone else who lives with you? No    6A. Do you have children who live with you?     No    6B. Do you have children who do not live with you?     No    7A. Who supports you in " "making changes in your alcohol or drug use? What are they willing to do to support you? Who is upset or angry about you making changes in your alcohol or drug use? How big a problem is this for you?      \"my counselor, really everyone in my life. Even my mom.\"    7B. This table is provided to record information about the person s relationships and available support It is not necessary to ask each item; only to get a comprehensive picture of their support system.  How often can you count on the following people when you need someone?   Partner / Spouse N/A   Parent(s)/Aunt(s)/Uncle(s)/Grandparents Usually supportive   Sibling(s)/Cousin(s) N/A   Child(ara) N/A   Other relative(s) Usually supportive   Friend(s)/neighbor(s) Always supportive   Child(ara) s father(s)/mother(s) N/A   Support group member(s) N/A   Community of neisha members Always supportive   /counselor/therapist/healer Always supportive   Other (specify) N/A     8A. What is your current living situation?     Pt is currently living with his mom.    8B. What is your long term plan for where you will be living?     \"Yeah I would like to.\"    8C. Tell me about your living environment/neighborhood? Ask enough follow up questions to determine safety, criminal activity, availability of alcohol and drugs, supportive or antagonistic to the person making changes.      No concerns    9. Criminal justice history: Gather current/recent history and any significant history related to substance use--Arrests? Convictions? Circumstances? Alcohol or drug involvement? Sentences? Still on probation or parole? Expectations of the court? Current court order? Any sex offenses - lifetime? What level? (DSM)    None    10. What obstacles exist to participating in treatment? (Time off work, childcare, funding, transportation, pending long term time, living situation)     \"Funding, but I think it should be manageable.\"  Pt is worried that while he is in tx, he will not have " an income.    Dimension VI Ratings   Recovery environment - The placing authority must use the criteria in Dimension VI to determine a client s recovery environment.   RISK DESCRIPTIONS - Severity ratin Client is engaged in structured, meaningful activity, but peers, family, significant other, and living environment are unsupportive, or there is criminal justice involvement by the client or among the client s peers, significant others, or in the client s living environment.    REASONS SEVERITY WAS ASSIGNED - (What support does the person have for making changes? What structure/stability does the person have in his or her daily life that will increase the likelihood that changes can be sustained? What problems exist in the person s environment that will jeopardize getting/staying clean and sober?)     The patient currently lives with his mom.  The patient denied having any concerns regarding his immediate living environment or neighborhood.  The patient reported having relationship conflict with everyone due to his ongoing substance abuse issues.  The patient identified as being heterosexual and he denied being in a romantic relationship at this time. The patient denied having a history of legal issues.  The patient reported that all of his use of meth had been done alone.  The patient has been employed part time at [employer] for almost 3 years.  The patient reported having some increased financial stress.  The patient lacks a current sober peer support network.         Client Choice/Exceptions   Would you like services specific to language, age, gender, culture, Gnosticism preference, race, ethnicity, sexual orientation or disability?  No    What particular treatment choices and options would you like to have? IP    Do you have a preference for a particular treatment program? [facility]    Criteria for Diagnosis     Criteria for Diagnosis  DSM-5 Criteria for Substance Use Disorder  Instructions:  Determine whether the client currently meets the criteria for Substance Use Disorder using the diagnostic criteria in the DSM-V pp.[id number]. Current means during the most recent 12 months outside a facility that controls access to substances    Category of Substance Severity (ICD-10 Code / DSM 5 Code)     Alcohol Use Disorder NA   Cannabis Use Disorder NA   Hallucinogen Use Disorder NA   Inhalant Use Disorder NA   Opioid Use Disorder Severe   (F11.20) (304.00)   Sedative, Hypnotic, or Anxiolytic Use Disorder NA   Stimulant Related Disorder Severe   (F15.20) (304.40) Amphetamine type substance   Tobacco Use Disorder Moderate   (F17.200) (305.1)   Other (or unknown) Substance Use Disorder NA       Collateral Contact Summary   Number of contacts made: 1    Contact with referring person:  NA.    If court related records were reviewed, summarize here: NA    Information from collateral contacts supported/largely agreed with information from the client and associated risk ratings.      Rule 25 Assessment Summary and Plan   's Recommendation    1)  Complete a residential based or similar treatment program, such as [facility].   2)  Abstain from all mood-altering chemicals unless prescribed by a licensed provider.   3)  Attend, at minimum, 2 weekly 12-step support group meetings.     4)  Actively work with a male sponsor on a weekly basis.   5)  Follow all the recommendations of your treatment/medical providers.  6)  Continue to attend your weekly individual psychotherapy with Juice [place].        Collateral Contacts     Name:    [facility]   Relationship:    EMR   Phone Number:     Releases:         The patient's medical record at [facility] was reviewed and the information contained in the medical record supported the patient's account of his chemical use history and chemical use consequences.    ollateral Contacts      A problematic pattern of alcohol/drug use leading to clinically  significant impairment or distress, as manifested by at least two of the following, occurring within a 12-month period:    Alcohol/drug is often taken in larger amounts or over a longer period than was intended.  There is a persistent desire or unsuccessful efforts to cut down or control alcohol/drug use  A great deal of time is spent in activities necessary to obtain alcohol, use alcohol, or recover from its effects.  Craving, or a strong desire or urge to use alcohol/drug  Recurrent alcohol/drug use resulting in a failure to fulfill major role obligations at work, school or home.  Continued alcohol use despite having persistent or recurrent social or interpersonal problems caused or exacerbated by the effects of alcohol/drug.  Important social, occupational, or recreational activities are given up or reduced because of alcohol/drug use.  Recurrent alcohol/drug use in situations in which it is physically hazardous.  Alcohol/drug use is continued despite knowledge of having a persistent or recurrent physical or psychological problem that is likely to have been caused or exacerbated by alcohol.  Tolerance, as defined by either of the following: A need for markedly increased amounts of alcohol/drug to achieve intoxication or desired effect.  Withdrawal, as manifested by either of the following: The characteristic withdrawal syndrome for alcohol/drug (refer to Criteria A and B of the criteria set for alcohol/drug withdrawal).      Specify if: In early remission:  After full criteria for alcohol/drug use disorder were previously met, none of the criteria for alcohol/drug use disorder have been met for at least 3 months but for less than 12 months (with the exception that Criterion A4,  Craving or a strong desire or urge to use alcohol/drug  may be met).     In sustained remission:   After full criteria for alcohol use disorder were previously met, non of the criteria for alcohol/drug use disorder have been met at any  time during a period of 12 months or longer (with the exception that Criterion A4,  Craving or strong desire or urge to use alcohol/drug  may be met).   Specify if:   This additional specifier is used if the individual is in an environment where access to alcohol is restricted.    Mild: Presence of 2-3 symptoms    Moderate: Presence of 4-5 symptoms    Severe: Presence of 6 or more symptoms
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
## 2017-08-16 ASSESSMENT — ANXIETY QUESTIONNAIRES: GAD7 TOTAL SCORE: 11
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
## 2017-09-14 ENCOUNTER — BEH TREATMENT PLAN (OUTPATIENT)
Dept: BEHAVIORAL HEALTH | Facility: CLINIC | Age: 32
End: 2017-09-14
Attending: FAMILY MEDICINE

## 2017-09-14 ENCOUNTER — HOSPITAL ENCOUNTER (OUTPATIENT)
Dept: BEHAVIORAL HEALTH | Facility: CLINIC | Age: 32
End: 2017-09-14
Attending: PSYCHIATRY & NEUROLOGY
Payer: COMMERCIAL

## 2017-09-14 PROBLEM — F19.20 CHEMICAL DEPENDENCY (H): Status: ACTIVE | Noted: 2017-09-14

## 2017-09-14 PROCEDURE — 10020000 ZZH LODGING PLUS FACILITY CHARGE ADULT

## 2017-09-14 PROCEDURE — H2035 A/D TX PROGRAM, PER HOUR: HCPCS | Mod: HQ

## 2017-09-14 RX ORDER — AMOXICILLIN 250 MG
2 CAPSULE ORAL DAILY PRN
COMMUNITY
End: 2017-10-07

## 2017-09-14 RX ORDER — MAGNESIUM HYDROXIDE/ALUMINUM HYDROXICE/SIMETHICONE 120; 1200; 1200 MG/30ML; MG/30ML; MG/30ML
30 SUSPENSION ORAL EVERY 6 HOURS PRN
COMMUNITY
End: 2017-10-07

## 2017-09-14 RX ORDER — LORATADINE 10 MG/1
10 TABLET ORAL DAILY PRN
COMMUNITY
End: 2017-10-07

## 2017-09-14 NOTE — PROGRESS NOTES
Name: Regan Quarles  Date: 9/14/2017  Medical Record: [id number]    Envelope Number: [id number]    List of Contents (List each item separately in new row):   Cell phone    Admission:  I am responsible for any personal items that are not sent to the safe or pharmacy.  [facility] is not responsible for loss, theft or damage of any property in my possession.      Patient Signature:  ___________________________________________       Date/Time:__________________________    Staff Signature: __________________________________       Date/Time:__________________________    2nd Staff person, if patient is unable/unwilling to sign:      __________________________________________________________       Date/Time: __________________________      Discharge:  [facility] has returned all of my personal belongings:    Patient Signature: ________________________________________     Date/Time: ____________________________________    Staff Signature: ______________________________________     Date/Time:_____________________________________

## 2017-09-14 NOTE — PROGRESS NOTES
[facility] Health Assessment    Patient Name:  Regan Quarles  Date of review:  9/14/2017  Vital signs: 134/92 P 83 T 98.1    Direct admission    Counselor: Sam  Drug of Choice: Meth; opiates  Last use: Meth 9/12/2017 ; Opiates - months ago.  Pt taking Suboxone for opiate addiction  Home clinic/MD: PCP - Provider Tejinder Wright -  [facility], [address], [address],  [place]  [address],  [phone].  Addiction Medicine.  Pt reports he saw his PCP 1 month ago for H & P.  Provider - Dr. Karuna Mills (Vale BARRAZA) [facility], [address] [place] [address]  [id number]  [phone] ex 1; Fax [phone]   Psychiatrist/therapist: [email or web address] -  Juice Apple; No Phsychiatrist    Medical history/current conditions:  Seasonal allergies;     Mental Health diagnosis: na  Medication compliant?: yes  Recent sucidal thoughts? no     When? na  Current thought of self-harm? no    Plan? na  Pt. Self rating of impulsiveness? (1-10 scale): 8    Pain assessment:   Pt. Experiencing pain at this time? No  Rating on 0-10 scale: (1-10 scale): na  Location: na  na  Result of: na  L P pain management strategy: OTC as needed  On-going nursing intervention required?   No

## 2017-09-14 NOTE — PROGRESS NOTES
[address] [address]          Juice Mount Ida (therapist)  Tel [phone]  Fax [phone]    Dear Juice,    This is to verify that Regan Quarles was admitted for treatment of chemical dependency at [place], [place] on 9/14/2017.    This individual is currently participating in:   ______ Inpatient   ___x___ [facility] (Residential Non-Medical)   ______ Day Outpatient   ______ Evening Outpatient       The client will participate in the program from three to four weeks, depending on the needs of the client.  Treatment is A.A. based and helps clients to achieve a chemically free lifestyle through lectures, individual, family and group therapy. He has been assigned to Sam Telephone: [phone] as his primary counselors.  If you have further questions, please contact us.    Respectfully,    JAKI Irving LIC, [facility]  Licensed Psychotherapist Supervisor  [address] [place]. [address]  [email or web address]  Tel. [phone]  Fax. [phone]    Fax 9/14/17

## 2017-09-14 NOTE — PROGRESS NOTES
HPI      ROS      Physical Exam
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
## 2017-09-14 NOTE — PROGRESS NOTES
First Group Note:    D) Patient attended his first group today (9/14/2017). Patient shared a brief history of circumstances around his admittance and was able to identify some ways peers can help him achieve some sober goals. Pt was welcomed by the group.  I) Counselor facilitated group.  Group rules and expectations were reviewed. Counselors and peers were introduced.  A) Patient appears to have adequate motivation for sobriety and seems appropriate for this level of care.  P) Patient to participate in all programming and be available for further assessment and to contribute to treatment planning.    Rome Galeano, [facility]

## 2017-09-15 ENCOUNTER — HOSPITAL ENCOUNTER (OUTPATIENT)
Dept: BEHAVIORAL HEALTH | Facility: CLINIC | Age: 32
End: 2017-09-15
Attending: FAMILY MEDICINE
Payer: COMMERCIAL

## 2017-09-15 PROCEDURE — 10020000 ZZH LODGING PLUS FACILITY CHARGE ADULT

## 2017-09-15 PROCEDURE — H2035 A/D TX PROGRAM, PER HOUR: HCPCS | Mod: HQ

## 2017-09-16 ENCOUNTER — HOSPITAL ENCOUNTER (OUTPATIENT)
Dept: BEHAVIORAL HEALTH | Facility: CLINIC | Age: 32
End: 2017-09-16
Attending: FAMILY MEDICINE
Payer: COMMERCIAL

## 2017-09-16 PROCEDURE — H2035 A/D TX PROGRAM, PER HOUR: HCPCS | Mod: HQ

## 2017-09-16 PROCEDURE — 10020000 ZZH LODGING PLUS FACILITY CHARGE ADULT

## 2017-09-17 ENCOUNTER — HOSPITAL ENCOUNTER (OUTPATIENT)
Dept: BEHAVIORAL HEALTH | Facility: CLINIC | Age: 32
End: 2017-09-17
Attending: FAMILY MEDICINE
Payer: COMMERCIAL

## 2017-09-17 PROCEDURE — 10020000 ZZH LODGING PLUS FACILITY CHARGE ADULT

## 2017-09-17 PROCEDURE — H2035 A/D TX PROGRAM, PER HOUR: HCPCS | Mod: HQ

## 2017-09-17 NOTE — PROGRESS NOTES
Patient Safety Plan Template    Name:   Regan Quarles   Date of Birth:  [birth year] Age:  32 MR Number:  [id number]   Step 1: Warning signs (Thoughts, images, mood, situation, behavior) that a crisis may be developin.    Complete Isolation   2.   Uncontrollable crying   3.   Thoughts of hurting myself   Step 2: Internal coping strategies - Things I can do to take my mind off of my problems without contacting another person (relaxation technique, physical activity):     1.  Mindfulness   2.   Exercise   3.   Music   Step 3: People and social settings that provide distraction:     1. Name:   Mother Phone: [phone]   2. Name:   Smoking area Phone:  Recreation room    3. Place:    4. Place:      The one thing that is most important to me and worth living for is: My loved ones.     Step 4: People whom I can ask for help:     1. Name:   Mother Phone:   [phone]   2. Name:   Sheree Harris Phone:      3. Name:   Juice Apple Phone:   -   Step 5: Professionals or agencies I can contact during a crisis:     1. Clinician Name:   Sheree Harris Phone:    Clinician Pager or Emergency Contact #:      2. Clinician Name: Juice Apple   Phone:      Clinician Pager or Emergency Contact #:      3. Local Urgent Care Services:     Urgent Care Services Address:     Urgent Care Services Phone:      4. Suicide Prevention Lifeline Phone: [phone] ([phone])     Step 6: Making the environment safe:     1.   Be around other people   2.     Safety Plan Template 2008 Samina Monteiro and Brian Trivedi is reprinted with the express permission of the authors.  No portion of the Safety Plan Template may be reproduced without the express, written permission.  You can contact the authors at [email or web address] or [email or web address].       Patient Signature: _____________________________________ Date:______________    Staff Signature: _______________________________________Date:_______________

## 2017-09-17 NOTE — PROGRESS NOTES
Acknowledgement of Current Treatment Plan     1. I have reviewed my treatment plan with my therapist / counselor on 9/17/17. I agree with the plan as it is written in the electronic health record.    Name Signature   Patient: Regan Quarles    Name of Therapist / Counselor    Counselor: JAKI Pineda      2. I have completed and reviewed my Safety Plan with my counselor and signed this on 9/17/17. I have been given the hard copy of this plan.    Patient signature:  ________________________________________________________________________    Signatures required for any additional Problems, Goals, and/or Interventions added to treatment plan:    I have been given a copy of the addition to my treatment plan in Dimension _____ on [date           ] and I agree with this as it is written in the electronic record.     Patient signature:   ________________________________________________________________________    I have been given a copy of the addition to my treatment plan in Dimension _____ on [date           ] and I agree with this as it is written in the electronic record.      Patient signature:   ________________________________________________________________________    I have been given a copy of the addition to my treatment plan in Dimension _____ on [date          ] and I agree with this as it is written in the electronic record.     Patient signature:   ________________________________________________________________________    I have been given a copy of the addition to my treatment plan in Dimension _____ on [date         ] and I agree with this as it is written in the electronic record.      Patient signature:   ________________________________________________________________________

## 2017-09-18 ENCOUNTER — HOSPITAL ENCOUNTER (OUTPATIENT)
Dept: BEHAVIORAL HEALTH | Facility: CLINIC | Age: 32
End: 2017-09-18
Attending: FAMILY MEDICINE
Payer: COMMERCIAL

## 2017-09-18 PROCEDURE — H2035 A/D TX PROGRAM, PER HOUR: HCPCS | Mod: HQ

## 2017-09-18 PROCEDURE — H2035 A/D TX PROGRAM, PER HOUR: HCPCS

## 2017-09-18 PROCEDURE — 10020000 ZZH LODGING PLUS FACILITY CHARGE ADULT

## 2017-09-19 ENCOUNTER — HOSPITAL ENCOUNTER (OUTPATIENT)
Dept: BEHAVIORAL HEALTH | Facility: CLINIC | Age: 32
End: 2017-09-19
Attending: FAMILY MEDICINE
Payer: COMMERCIAL

## 2017-09-19 PROCEDURE — H2035 A/D TX PROGRAM, PER HOUR: HCPCS | Mod: HQ

## 2017-09-19 PROCEDURE — 10020000 ZZH LODGING PLUS FACILITY CHARGE ADULT
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
## 2017-09-20 ENCOUNTER — HOSPITAL ENCOUNTER (OUTPATIENT)
Dept: BEHAVIORAL HEALTH | Facility: CLINIC | Age: 32
End: 2017-09-20
Attending: FAMILY MEDICINE
Payer: COMMERCIAL

## 2017-09-20 PROCEDURE — H2035 A/D TX PROGRAM, PER HOUR: HCPCS | Mod: HQ

## 2017-09-20 PROCEDURE — 10020000 ZZH LODGING PLUS FACILITY CHARGE ADULT
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
## 2017-09-21 ENCOUNTER — HOSPITAL ENCOUNTER (OUTPATIENT)
Dept: BEHAVIORAL HEALTH | Facility: CLINIC | Age: 32
End: 2017-09-21
Attending: FAMILY MEDICINE
Payer: COMMERCIAL

## 2017-09-21 PROCEDURE — H2035 A/D TX PROGRAM, PER HOUR: HCPCS | Mod: HQ

## 2017-09-21 PROCEDURE — 10020000 ZZH LODGING PLUS FACILITY CHARGE ADULT

## 2017-09-22 ENCOUNTER — HOSPITAL ENCOUNTER (OUTPATIENT)
Dept: BEHAVIORAL HEALTH | Facility: CLINIC | Age: 32
End: 2017-09-22
Attending: FAMILY MEDICINE
Payer: COMMERCIAL

## 2017-09-22 PROCEDURE — H2035 A/D TX PROGRAM, PER HOUR: HCPCS | Mod: HQ

## 2017-09-22 PROCEDURE — 10020000 ZZH LODGING PLUS FACILITY CHARGE ADULT

## 2017-09-23 ENCOUNTER — HOSPITAL ENCOUNTER (OUTPATIENT)
Dept: BEHAVIORAL HEALTH | Facility: CLINIC | Age: 32
End: 2017-09-23
Attending: FAMILY MEDICINE
Payer: COMMERCIAL

## 2017-09-23 PROCEDURE — 10020000 ZZH LODGING PLUS FACILITY CHARGE ADULT

## 2017-09-23 PROCEDURE — H2035 A/D TX PROGRAM, PER HOUR: HCPCS | Mod: HQ

## 2017-09-24 ENCOUNTER — HOSPITAL ENCOUNTER (OUTPATIENT)
Dept: BEHAVIORAL HEALTH | Facility: CLINIC | Age: 32
End: 2017-09-24
Attending: FAMILY MEDICINE
Payer: COMMERCIAL

## 2017-09-24 PROCEDURE — H2035 A/D TX PROGRAM, PER HOUR: HCPCS | Mod: HQ

## 2017-09-24 PROCEDURE — 10020000 ZZH LODGING PLUS FACILITY CHARGE ADULT

## 2017-09-25 ENCOUNTER — HOSPITAL ENCOUNTER (OUTPATIENT)
Dept: BEHAVIORAL HEALTH | Facility: CLINIC | Age: 32
End: 2017-09-25
Attending: FAMILY MEDICINE
Payer: COMMERCIAL

## 2017-09-25 PROCEDURE — 10020000 ZZH LODGING PLUS FACILITY CHARGE ADULT

## 2017-09-25 PROCEDURE — H2035 A/D TX PROGRAM, PER HOUR: HCPCS | Mod: HQ

## 2017-09-25 NOTE — PROGRESS NOTES
"        Adult CD Progress Note and Treatment Plan Review     Attendance  Please refer to OP BEH CD Adult Attendance Record Documentation Flowsheet    Support group attended this week: yes    Reporting sobriety:  yes    Treatment Plan     Treatment Plan Review competed on: 9/25/2017      Client preferred learning style: Hands on  Verbal    Staff Members contributing: Rome Galeano, Lead Counselor, [facility]; Min Fajardo, [facility]; Bruna Mascorro, [facility]                        Received Supervision: No    Client: contributed to goals and plan.    Client received copy of plan/revised plan: N/A    Client agrees with plan/revised plan: Yes    Changes to Treatment Plan: No    New Goals added since last review: None      Goals worked on since last review: See below Dimensions/risk ratings.    Strategies effective: yes    Strategies need these changes: None    ASAM Risk Rating:    Dimension 1 1 No changes    Dimension 2 0 No changes    Dimension 3 2 Pt.reports he has no suicidal ideation.Pt.is staying med compliant.Pt.completed and shared in group his \"Coping with Stress\"assignment.Pt.recieved positive feedback from his peers.Pt.is working on his emotional abuse assignment.    Dimension 4 2 Pt.is working on his drug use history and consequences of use assignment.,Pt.will share his first step in group this week.Pt.has been attending all lectures and groups.    Dimension 5 4 Pt.continues to gain insight into his high risk using situations.Pt.lacks insight into his relapse warning signs and triggers in the areas of people places,activities and feelings. Pt.is working on completing the \"Book Of Me\" assignment.Pt.will be attending a relapse prevention workshop.    Dimension 6 3 Pt.invited his mother to participate in the family program.Pt.is attending 5 12 step meetings per week while in treatment.Pt.needs to obtain a sponsor.    Any changes in Vulnerable Adult Status?  No  If yes, add to treatment plan and individual abuse prevention " plan.    Family Involvement:   MURIEL signed    Data:   offered feedback, good insight, client did actively participate  Patient continues to be active in group therapy.      Intervention:   Counselor feedback  Group feedback    Assessment:   Stages of Change Model  Action    Appears/Sounds:  Cooperative  Motivated  Engaged      Plan:  Continue group therapy.

## 2017-09-26 ENCOUNTER — HOSPITAL ENCOUNTER (OUTPATIENT)
Dept: BEHAVIORAL HEALTH | Facility: CLINIC | Age: 32
End: 2017-09-26
Attending: FAMILY MEDICINE
Payer: COMMERCIAL

## 2017-09-26 PROCEDURE — 10020000 ZZH LODGING PLUS FACILITY CHARGE ADULT

## 2017-09-26 PROCEDURE — H2035 A/D TX PROGRAM, PER HOUR: HCPCS | Mod: HQ

## 2017-09-27 ENCOUNTER — HOSPITAL ENCOUNTER (OUTPATIENT)
Dept: BEHAVIORAL HEALTH | Facility: CLINIC | Age: 32
End: 2017-09-27
Attending: FAMILY MEDICINE
Payer: COMMERCIAL

## 2017-09-27 PROCEDURE — H2035 A/D TX PROGRAM, PER HOUR: HCPCS | Mod: HQ

## 2017-09-27 PROCEDURE — 10020000 ZZH LODGING PLUS FACILITY CHARGE ADULT

## 2017-09-28 ENCOUNTER — TELEPHONE (OUTPATIENT)
Dept: BEHAVIORAL HEALTH | Facility: CLINIC | Age: 32
End: 2017-09-28

## 2017-09-28 ENCOUNTER — HOSPITAL ENCOUNTER (OUTPATIENT)
Dept: BEHAVIORAL HEALTH | Facility: CLINIC | Age: 32
End: 2017-09-28
Attending: FAMILY MEDICINE
Payer: COMMERCIAL

## 2017-09-28 PROCEDURE — 10020000 ZZH LODGING PLUS FACILITY CHARGE ADULT

## 2017-09-28 PROCEDURE — H2035 A/D TX PROGRAM, PER HOUR: HCPCS | Mod: HQ

## 2017-09-28 NOTE — TELEPHONE ENCOUNTER
A phone call was made as follow-up to the Family Program mailing for the week of 10/9/17. Message left or spoke in person to individuals on MURIEL.

## 2017-09-29 ENCOUNTER — HOSPITAL ENCOUNTER (OUTPATIENT)
Dept: BEHAVIORAL HEALTH | Facility: CLINIC | Age: 32
End: 2017-09-29
Attending: FAMILY MEDICINE
Payer: COMMERCIAL

## 2017-09-29 PROCEDURE — H2035 A/D TX PROGRAM, PER HOUR: HCPCS | Mod: HQ

## 2017-09-29 PROCEDURE — 10020000 ZZH LODGING PLUS FACILITY CHARGE ADULT

## 2017-09-30 ENCOUNTER — HOSPITAL ENCOUNTER (OUTPATIENT)
Dept: BEHAVIORAL HEALTH | Facility: CLINIC | Age: 32
End: 2017-09-30
Attending: FAMILY MEDICINE
Payer: COMMERCIAL

## 2017-09-30 PROCEDURE — 10020000 ZZH LODGING PLUS FACILITY CHARGE ADULT

## 2017-09-30 PROCEDURE — H2035 A/D TX PROGRAM, PER HOUR: HCPCS | Mod: HQ

## 2017-10-01 ENCOUNTER — HOSPITAL ENCOUNTER (OUTPATIENT)
Dept: BEHAVIORAL HEALTH | Facility: CLINIC | Age: 32
End: 2017-10-01
Attending: FAMILY MEDICINE
Payer: COMMERCIAL

## 2017-10-01 PROCEDURE — 10020000 ZZH LODGING PLUS FACILITY CHARGE ADULT

## 2017-10-01 PROCEDURE — H2035 A/D TX PROGRAM, PER HOUR: HCPCS | Mod: HQ

## 2017-10-01 NOTE — PROGRESS NOTES
"        Adult CD Progress Note and Treatment Plan Review     Attendance  Please refer to OP BEH CD Adult Attendance Record Documentation Flowsheet    Support group attended this week: yes    Reporting sobriety:  yes    Treatment Plan     Treatment Plan Review competed on: 10/1/2017      Client preferred learning style: Hands on  Verbal    Staff Members contributing: Rome Galeano, Lead Counselor, [facility]; Min Fajardo, [facility]; Bruna Mascorro, [facility]                        Received Supervision: No    Client: contributed to goals and plan.    Client received copy of plan/revised plan: N/A    Client agrees with plan/revised plan: Yes    Changes to Treatment Plan: No    New Goals added since last review: None      Goals worked on since last review: See below Dimensions/risk ratings.    Strategies effective: yes    Strategies need these changes: None    ASAM Risk Rating:    Dimension 1 1 No changes    Dimension 2 0 No changes    Dimension 3 2 Pt.reported no suicidal ideation this week.Pt.is staying med compliant.Pt.has completed his \"Stress Management\"assignment.Pt.recieved positive feedback from his peers.Pt.Read \"What Is Normal:and shared a 2 page reflection paper in group.Pt.has gained insight into how his addiction has affected his mental health.    Dimension 4 1 Pt.has completed his first step and drug use history assignments.Pt.is working hard on increasing his internal motivation to change his life style to maintain sobriety.Pt.is attending all lectures and groups.    Dimension 5 4 Pt.attended the relapse prevention workshop.He was asked to identify his relapse warning signs and triggers in the areas of people,places,activities and feelings.Pt.will need more work in this area of relapse prevention.Pt.is working on his 5 yrs sober vs 5 yrs using assignment.    Dimension 6 3 Pt.is attending 5 12 step meetings per week while in treatment.Pt.needs to obtain a sponsor.Pt.invited his mother to the family program.    Any " changes in Vulnerable Adult Status?  No  If yes, add to treatment plan and individual abuse prevention plan.    Family Involvement:   MURIEL signed    Data:   offered feedback, good insight, client did actively participate  Patient continues to be active in group therapy.  Pt.participated in group discussion on lecture topics such as forgiveness,addiction and the angel,hope.boundaries,steps 1,2 &3 and balanced diet.    Intervention:   Counselor feedback  Group feedback    Assessment:   Stages of Change Model  Action    Appears/Sounds:  Cooperative  Motivated  Engaged      Plan:  Continue group therapy.

## 2017-10-02 ENCOUNTER — HOSPITAL ENCOUNTER (OUTPATIENT)
Dept: BEHAVIORAL HEALTH | Facility: CLINIC | Age: 32
End: 2017-10-02
Attending: FAMILY MEDICINE
Payer: COMMERCIAL

## 2017-10-02 PROCEDURE — 10020000 ZZH LODGING PLUS FACILITY CHARGE ADULT

## 2017-10-02 PROCEDURE — H2035 A/D TX PROGRAM, PER HOUR: HCPCS | Mod: HQ

## 2017-10-03 ENCOUNTER — HOSPITAL ENCOUNTER (OUTPATIENT)
Dept: BEHAVIORAL HEALTH | Facility: CLINIC | Age: 32
End: 2017-10-03
Attending: FAMILY MEDICINE
Payer: COMMERCIAL

## 2017-10-03 PROCEDURE — 10020000 ZZH LODGING PLUS FACILITY CHARGE ADULT

## 2017-10-03 PROCEDURE — H2035 A/D TX PROGRAM, PER HOUR: HCPCS | Mod: HQ

## 2017-10-04 ENCOUNTER — HOSPITAL ENCOUNTER (OUTPATIENT)
Dept: BEHAVIORAL HEALTH | Facility: CLINIC | Age: 32
End: 2017-10-04
Attending: FAMILY MEDICINE
Payer: COMMERCIAL

## 2017-10-04 PROCEDURE — H2035 A/D TX PROGRAM, PER HOUR: HCPCS | Mod: HQ

## 2017-10-04 PROCEDURE — 10020000 ZZH LODGING PLUS FACILITY CHARGE ADULT

## 2017-10-05 ENCOUNTER — HOSPITAL ENCOUNTER (OUTPATIENT)
Dept: BEHAVIORAL HEALTH | Facility: CLINIC | Age: 32
End: 2017-10-05
Attending: FAMILY MEDICINE
Payer: COMMERCIAL

## 2017-10-05 PROCEDURE — 10020000 ZZH LODGING PLUS FACILITY CHARGE ADULT

## 2017-10-05 PROCEDURE — H2035 A/D TX PROGRAM, PER HOUR: HCPCS | Mod: HQ

## 2017-10-06 ENCOUNTER — HOSPITAL ENCOUNTER (OUTPATIENT)
Dept: BEHAVIORAL HEALTH | Facility: CLINIC | Age: 32
End: 2017-10-06
Attending: FAMILY MEDICINE
Payer: COMMERCIAL

## 2017-10-06 PROCEDURE — H2035 A/D TX PROGRAM, PER HOUR: HCPCS | Mod: HQ

## 2017-10-06 PROCEDURE — 10020000 ZZH LODGING PLUS FACILITY CHARGE ADULT

## 2017-10-07 ENCOUNTER — HOSPITAL ENCOUNTER (OUTPATIENT)
Dept: BEHAVIORAL HEALTH | Facility: CLINIC | Age: 32
End: 2017-10-07
Attending: FAMILY MEDICINE
Payer: COMMERCIAL

## 2017-10-07 PROCEDURE — H2035 A/D TX PROGRAM, PER HOUR: HCPCS | Mod: HQ

## 2017-10-07 PROCEDURE — 10020000 ZZH LODGING PLUS FACILITY CHARGE ADULT

## 2017-10-07 NOTE — PROGRESS NOTES
Nursing Discharge Planning Meeting    Writer completed discharge planning meeting with patient. Discharge is planned for Thursday, October 12th.    Discussed appropriate follow up care to manage suboxone and to obtain medication refills. Patient stopped taking Gabapentin (not effective - no change in anxiety) and Buspar due to side effects (worsening anxiety). Questions answered and patient verbalized understanding of post-discharge follow up plan.    Patient to schedule an appointment with his primary care provider as needed and attend Suboxone maintenance appointment with Dr. Mills at [facility] scheduled for 2PM on 10/12/2017 after discharge.     Continue to support patient in discharge planning as needed to assure appropriate continuity of care.     Essential Oil Therapy  Patient used essential oil therapy during treatment program: No

## 2017-10-08 ENCOUNTER — HOSPITAL ENCOUNTER (OUTPATIENT)
Dept: BEHAVIORAL HEALTH | Facility: CLINIC | Age: 32
End: 2017-10-08
Attending: FAMILY MEDICINE
Payer: COMMERCIAL

## 2017-10-08 PROCEDURE — 10020000 ZZH LODGING PLUS FACILITY CHARGE ADULT

## 2017-10-08 PROCEDURE — H2035 A/D TX PROGRAM, PER HOUR: HCPCS | Mod: HQ

## 2017-10-09 ENCOUNTER — HOSPITAL ENCOUNTER (OUTPATIENT)
Dept: BEHAVIORAL HEALTH | Facility: CLINIC | Age: 32
End: 2017-10-09
Attending: FAMILY MEDICINE
Payer: COMMERCIAL

## 2017-10-09 PROCEDURE — 10020000 ZZH LODGING PLUS FACILITY CHARGE ADULT

## 2017-10-09 PROCEDURE — H2035 A/D TX PROGRAM, PER HOUR: HCPCS | Mod: HQ
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
## 2017-10-10 ENCOUNTER — HOSPITAL ENCOUNTER (OUTPATIENT)
Dept: BEHAVIORAL HEALTH | Facility: CLINIC | Age: 32
End: 2017-10-10
Attending: FAMILY MEDICINE
Payer: COMMERCIAL

## 2017-10-10 PROCEDURE — H2035 A/D TX PROGRAM, PER HOUR: HCPCS | Mod: HQ

## 2017-10-10 PROCEDURE — 10020000 ZZH LODGING PLUS FACILITY CHARGE ADULT

## 2017-10-11 ENCOUNTER — HOSPITAL ENCOUNTER (OUTPATIENT)
Dept: BEHAVIORAL HEALTH | Facility: CLINIC | Age: 32
End: 2017-10-11
Attending: FAMILY MEDICINE
Payer: COMMERCIAL

## 2017-10-11 PROCEDURE — 10020000 ZZH LODGING PLUS FACILITY CHARGE ADULT

## 2017-10-11 PROCEDURE — H2035 A/D TX PROGRAM, PER HOUR: HCPCS | Mod: HQ

## 2017-10-11 NOTE — PROGRESS NOTES
MICD Discharge Summary/Instructions    Patient:  Regan Quarles    MRN: [id number]  : 1985 Age: 32 year old Sex: male   -   Focus of Treatment / Discharge Recommendations   Personal Safety/ Management of Symptoms   * Follow your safety plan. Report increased symptoms to your care team and /or go to the nearest Emergency Department.   * Call crisis lines as needed   [facility] [phone] [facility] [phone]   [facility] [phone] Crisis Connection [phone]   [facility] [phone] [facility] [phone]   [facility] [phone] National Suicide Prevention [phone]   [facility] [phone] Suicide Prevention [phone]   [facility] [phone]   Abstinence/Relapse Prevention   * Take all medicines as directed. Carry a current list of medicines with you.   * Use coping skills: Use peer support group,continue to attend 12 step meetings.Obtain and work with sponsor.   * Do not use illicit (street) drugs, controlled substances (narcotics) or alcohol.   Develop/Improve Independent Living/Socialization Skills: Continue to build relationship with family and sober support network.  Community Resources/Supports and Discharge Planning: Attend three 12 step meetings per week.Obtain sponsor.Attend Phase 2 on Thurs at 5:30pm   Follow up with psychiatrist / main caregiver: SANTOS Next visit: TBD   Follow up with your therapist: N/A Next visit: N/A   Go to group therapy and / or support groups at: Phase 2 and 12 step meetings by your home.   See your medical doctor about: N/A   Other:   Client Signature:_______________________ Date / Time:___________   Staff Signature:________________________ Date / Time:___________
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
## 2017-10-12 NOTE — ADDENDUM NOTE
Encounter addended by: [place] LADC on: 10/12/2017 10:55 AM<BR>     Actions taken: Sign clinical note
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
## 2017-10-19 ENCOUNTER — HOSPITAL ENCOUNTER (OUTPATIENT)
Dept: BEHAVIORAL HEALTH | Facility: CLINIC | Age: 32
End: 2017-10-19
Attending: SOCIAL WORKER
Payer: COMMERCIAL

## 2017-10-19 PROCEDURE — H2035 A/D TX PROGRAM, PER HOUR: HCPCS | Mod: HQ

## 2017-10-24 NOTE — ADDENDUM NOTE
Encounter addended by: Min Fajardo LADC on: 10/24/2017  1:02 PM<BR>     Actions taken: Sign clinical note

## 2017-10-24 NOTE — PROGRESS NOTES
Patient:              Adult CD Progress Note and Treatment Plan Review     Attendance  Please refer to [facility] CD Adult Attendance Record Documentation Flowsheet    Support group attended this week: yes    Reporting sobriety:  yes    Treatment Plan     Treatment Plan Review competed on:    10/19/17    Client preferred learning style: Visual; Hands on; Verbal; Demonstration     Staff Members contributing: JAKI Llanos                    Received Supervision: No    Client: contributed to goals and plan.    Client received copy of plan/revised plan: Yes    Client agrees with plan/revised plan: Yes    Changes to Treatment Plan: No    New Goals added since last review: None    Goals worked on since last review: completing LP primary tx, building sober support, getting job back, adjusting to being home following tx, reconnecting in relationships    Strategies effective: yes    Strategies need these changes: Continue working on above goals.     ASAM Risk Rating:    Dimension 1 0 No problems. Pt's BRYNN: 9/12/17. Pt is on Suboxone maintenance.     Dimension 2 0 No problems.    Dimension 3 1 Pt denies any mental health diagnoses. Pt denies any suicidal ideations this week and his mood appears stable. Pt is trying to work on his codependency with his mother.     Dimension 4 0 Pt continues to identify the negative consequences of his chemical use. Pt denies any major cravings this week.     Dimension 5 3 Pt continues to build and is beginning to utilize coping skills to combat relapse triggers and cues and avoid relapse.     Dimension 6 2 Pt reported that he attended two 12-step meetings this week, but that he does not yet have a sponsor. Pt reports he is living with his mother. Pt reported that he got his job back and returned to work.      Any changes in Vulnerable Adult Status?  No  If yes, add to treatment plan and individual abuse prevention plan.    Family Involvement:   Pt participated in the family week program  with his mother while in LP.     Data:   Pt's first Phase II session. Pt stated that he is feeling anxious, excited, and hopeful this evening. Pt shared that he was hired back for his old job at [employer] and that he is excited and grateful for that. Pt also talked about seeing his best friend at work and that he has set up that they are going to get together to hang out.     Intervention:   Staff facilitated group and pt actively participated and offered fellow peers feedback.    Assessment:   Pt appears to be adjusting well to being home following tx.     Plan:  Focus on recovery environment  Monitor emotional/physical health      JAKI Singleton

## 2017-10-26 ENCOUNTER — HOSPITAL ENCOUNTER (OUTPATIENT)
Dept: BEHAVIORAL HEALTH | Facility: CLINIC | Age: 32
End: 2017-10-26
Attending: SOCIAL WORKER
Payer: COMMERCIAL

## 2017-10-26 PROCEDURE — H2035 A/D TX PROGRAM, PER HOUR: HCPCS | Mod: HQ

## 2017-10-27 NOTE — ADDENDUM NOTE
Encounter addended by: Min Fajardo LADC on: 10/27/2017  1:49 PM<BR>     Actions taken: Sign clinical note

## 2017-10-27 NOTE — PROGRESS NOTES
Patient:              Adult CD Progress Note and Treatment Plan Review     Attendance  Please refer to [facility] CD Adult Attendance Record Documentation Flowsheet    Support group attended this week: yes    Reporting sobriety:  yes    Treatment Plan     Treatment Plan Review competed on:    10/26/17    Client preferred learning style:     Visual   Hands on   Verbal   Demonstration     Staff Members contributing:     JAKI Llanos                        Received Supervision: No    Client: contributed to goals and plan.    Client received copy of plan: Yes    Client agrees with plan/revised plan: Yes    Changes to Treatment Plan: No    New Goals added since last review: None    Goals: Change, Motivation, Relapse Prevention, Relationships, Mental/Emotional Health,     Strategies effective: yes    Strategies need these changes: Continue working on above goals.     ASAM Risk Rating:    Dimension 1 0  Pt's BRYNN: 9/12/17. Pt is on Suboxone maintenance. Patient reports no additional issues at this time.      Dimension 2 0 Patient reports no biomedical issues at this time.       Dimension 3 1 Patient reports no mental health diagnosis. Patient reports stress due to getting into what he describes as a jameson argument with his mother. Patient reports no suicidal ideation.      Dimension 4 0 Patient reports that he's having a difficult time finding the motivation to deal with his sobriety and continuing to live with his mother presents a challenge for him. Patient continues to identify negative consequences of his addictive behavior.      Dimension 5 3 Patient reports that he did experience cravings last week do to the stress associated with returning to work. Patient reports that he did not reach out to any of his peers, but acknowledges to need to do so. Patient continues to build and utilize coping skills necessary to combat relapse triggers and cues..        Dimension 6 2 Pt reported that he attended two 12-step  meetings this week, but  he does not yet have a sponsor. Patient reports that he has demonstrated recovery skills by seeing a suboxone Dr. , meeting his girlfriend, going to an apple orchard, buying a blueberry pie and playing the drums.     Any changes in Vulnerable Adult Status?  No  If yes, add to treatment plan and individual abuse prevention plan.    Family Involvement:   N/A    Data:   Patient     Intervention:   Staff facilitated group and pt actively participated and offered fellow peers feedback.    Assessment:   Patient appears to be challenged in his sobriety this week, but did practice the coping skills learned in treatment and remained sober.     Plan:  Focus on recovery environment  Monitor emotional/physical health      JAKI Pineda

## 2017-11-02 ENCOUNTER — HOSPITAL ENCOUNTER (OUTPATIENT)
Dept: BEHAVIORAL HEALTH | Facility: CLINIC | Age: 32
End: 2017-11-02
Attending: SOCIAL WORKER
Payer: COMMERCIAL

## 2017-11-02 PROCEDURE — H2035 A/D TX PROGRAM, PER HOUR: HCPCS | Mod: HQ

## 2017-11-19 NOTE — PROGRESS NOTES
Patient:  Regan Quarles            Adult CD Progress Note and Treatment Plan Review     Attendance  Please refer to [facility] CD Adult Attendance Record Documentation Flowsheet    Support group attended this week: yes    Reporting sobriety:  no    Treatment Plan     Treatment Plan Review competed on:    11/2/17    Client preferred learning style: Visual; Hands on; Verbal; Demonstration     Staff Members contributing: JAKI Llanos                    Received Supervision: No    Client: contributed to goals and plan.    Client received copy of plan/revised plan: Yes    Client agrees with plan/revised plan: Yes    Changes to Treatment Plan: No    New Goals added since last review: None    Goals worked on since last review: building sober support, reconnecting in relationships, remaining in contact with sober tx friends, working at his job, attempting to remain abstinent    Strategies effective: yes    Strategies need these changes: Continue working on above goals.     ASAM Risk Rating:    Dimension 1 0 No problems. Per pt report, his BRYNN date is: 10/31/17. Pt is on Suboxone maintenance.     Dimension 2 0 No problems.    Dimension 3 1 Pt denies any mental health diagnoses. Pt denies any suicidal ideations this week and his mood appears stable, other than feeling down and guilty since he did not remain sober this past week. Pt is trying to work on his codepenThe Start Projectcy with his mother.     Dimension 4 1 Pt continues to identify the negative consequences of his chemical use. Pt reported that he did not stay sober this week and that he used meth a couple of days ago.    Dimension 5 3 Pt continues to build and is beginning to utilize coping skills to combat relapse triggers and cues and avoid relapse.     Dimension 6 2 Pt reported that he attended one 12-step meeting this week, but that he does not yet have a sponsor. Pt reports he is living with his mother. Pt reported that he continues to work at [employer]. Pt reported that  he remained in contact with sober peers this week.      Any changes in Vulnerable Adult Status?  No  If yes, add to treatment plan and individual abuse prevention plan.    Family Involvement:   Pt participated in the family week program with his mother while in LP.     Data:   Pt stated that he is feeling angry, frustrated, and hopeful this evening. Pt reported that he relapsed this past Tuesday on meth and that he is sorry that he did it. Pt talked about that it has been difficult to be living with his mother and that he was supposed to get together with a friend, but it fell through and he was disappointed so he feels like it led to his desire to use. Group members and counseling staff processed pt's relapse in the group session. Pt was given resources for sober houses.     Intervention:   Staff facilitated group and pt actively participated and offered fellow peers feedback.    Assessment:   Pt appears to be struggling with continuing to live with his mother and seems to want to find a sober house.     Plan:  Focus on recovery environment  Monitor emotional/physical health      JAKI Singleton

## 2017-11-19 NOTE — ADDENDUM NOTE
Encounter addended by: Min Fajardo LADC on: 11/19/2017 11:20 AM<BR>     Actions taken: Sign clinical note

## 2017-11-30 ENCOUNTER — HOSPITAL ENCOUNTER (OUTPATIENT)
Dept: BEHAVIORAL HEALTH | Facility: CLINIC | Age: 32
End: 2017-11-30
Attending: SOCIAL WORKER
Payer: COMMERCIAL

## 2017-11-30 PROCEDURE — H2035 A/D TX PROGRAM, PER HOUR: HCPCS | Mod: HQ

## 2017-12-07 ENCOUNTER — HOSPITAL ENCOUNTER (OUTPATIENT)
Dept: BEHAVIORAL HEALTH | Facility: CLINIC | Age: 32
End: 2017-12-07
Attending: SOCIAL WORKER

## 2017-12-07 PROCEDURE — H2035 A/D TX PROGRAM, PER HOUR: HCPCS | Mod: HQ

## 2018-03-15 ENCOUNTER — HOSPITAL ENCOUNTER (OUTPATIENT)
Dept: BEHAVIORAL HEALTH | Facility: CLINIC | Age: 33
Discharge: HOME | End: 2018-03-15
Attending: PSYCHIATRY & NEUROLOGY | Admitting: PSYCHIATRY & NEUROLOGY
Payer: COMMERCIAL

## 2018-03-15 PROCEDURE — 90791 PSYCH DIAGNOSTIC EVALUATION: CPT | Performed by: PSYCHOLOGIST

## 2018-03-15 RX ORDER — BUPRENORPHINE AND NALOXONE 4; 1 MG/1; MG/1
1 FILM, SOLUBLE BUCCAL; SUBLINGUAL DAILY
COMMUNITY

## 2018-03-15 ASSESSMENT — PAIN SCALES - GENERAL: PAINLEVEL: NO PAIN (0)

## 2018-03-15 NOTE — PROGRESS NOTES
"Standard Diagnostic Assessment Update     CLIENT'S NAME: Regan Quarles  MRN:   [id number]  :   1985 AGE:32 year old SEX: male  ACCT. NUMBER: [id number]  DATE OF SERVICE: 3/15/18 Start Time:  1200 End Time:  1300    PHONE: [phone] (home)   MOBILE/CELL PHONE: [phone]  Preferred Phone: same  May we leave a program related message? yes    Initial Diagnostic Assessment Date: 2017 Completed by: Miguel Tan MA [facility]    Instructions: Flowsheets including: pain assessment, allergies, medical history, surgery history, medications, family history, communication record, and advanced directives have been reviewed.    Identifying Information:  Regan Quarles is a 32 year old, White, single male. Regan attended the   alone.     Reason for Referral: Regan was referred to MI/CD Treatment (MICD) by self. Pt reports he has a hx here and is comfortable coming back to [place]. He was in the program a day 2017 and was using so he went to residential treatment at [facility] -10/17.     Regan verbalizes the following treatment/discharge goals: \"More stability for my life, want more stability, make more of a life for myself.\"     Current Stressors/Losses/Disappointments: Pt reports he went to penitentiary for the first time 6 weeks ago. He reports his mother had an order for protection and called his mom. He had an argument with his mother, police were called and he went to penitentiary. Pt reports he is now living there and OFP has been dropped.     Per Client, Review of Symptoms:  Mood (Depression/Anxiety/Vandana/Anger): Pt reports his mood fluctuates a lot, has a lack of control how he feels, and is very anxious. He reports he has had some panic attacks. Pt reports he can be quick to anger sometimes.      Thoughts: Pt reports he is \"not really\" having suicidal thoughts. He said \"it's just hard.\" He reports the last time he had S/I was a week ago. Pt denies a hx of attempts.    Concentration/Memory: Pt reports " "his concentration is \"very bad.\" He reports his memory is \"pretty good.\"   Appetite/Weight: (see also, Physical Health Screening below) Pt reports appetite is WNL   Sleep: Pt reports his sleep is variable. He reports broken sleep some nights. He has insomnia sometimes.   Motivation/Energy: Pt reports his energy is low and motivation is low.   Behavior: Pt reports he has had arguments with his mother recently and broken some stuff. He threw a cup.      Psychosis: Pt reports no psychosis.      Trauma: Pt reports hx of emotional trauma in childhood. He reports sometimes he has flashbacks to it.    Other: Pt reports he feels hopeless, worthless, tired, nervous, has trouble leaving the house, is uneasy in crowds, cries easily.     Onset/Duration/Pattern of Symptoms noted above: this intense for the last 3-4 months.      Regan reports the following understanding of his diagnosis: \"No idea\"    Instructions: Please update any of the following areas with any changes that have occurred since the initial DA. Indicate \"No changes identified since the initial DA\" when applicable. (.nci)    Personal Safety: Since the initial DA, Regan:   reports personal safety concerns. Pt reports S/I and last had S/I a week ago.    denies suicidal ideation or attempts.    denies changes to Mental Health or other providers.   Denies further hospitalizations or changes in commitment status.     Personal Safety Summary:  After gathering the above information, Regan presents the following high risk factors for suicide: Male Recent substance abuse Poor sleep Panic,extreme anxiety Mood disorder with psychosis/paranoia Extreme psychic pain Hopelessness, Worthlessness. Regan denies current fears or concerns for personal safety.     Upon review of the patient interview and identification of high risk factors determine individualized safety strategies alternatives and treatment plan interventions. Client consented to co-developed safety plan, " which includes coping skills and resources. .     Substance Use History:   Substance Use History:   MICD Addendum - Comprehensive Assessment      Detox: Regan reports 0 lifetime detox admissions.      Treatment of Substance Use Disorder:   Regan has received chemical dependency treatment in the past at [facility] since 4/2017, methadone program X 5 years in the past.      Addiction Pharmacology: Regan denies use of addiction pharmacology.       Contraindicated Medication Use: Regan denies current Rx/use of stimulant, benzodiazapine and/or narcotic medications.      Prioritization Status:   Regan denies a history of substance use by injection.      Substances Use History:      Substances Used:       Age of First Use Last Use Date / Amount (if available)  Most Recent Pattern of Use & Duration    (both frequency & amounts)  Method of use:      Alcohol     yes    Age of 1st  Intoxication:     15  Date: months ago  Amount:      # Days Used Past 30 Days:  0 Pt reports almost never drinking      Oral   Cocaine Powder/  Crack-Cocaine      yes          Cocaine 27  Date: 27  Amount:      # Days Used Past 30 Days:  0     Pt reports using cocaine twice  Snort   Cannabis/Marijuana/  Synthetic/Hashish         yes           15  Date: 31  Amount:      # Days Used Past 30 Days:  0  Pt denies recent use, he has used regularly from [age] daily.      Pt reports on and off the past few years.   Smoke   Heroin     no           Non-Prescription Methadone     no           Other Opiates/Synthetics      yes           23    Date: 31  Amount:      # Days Used Past 30 Days:  0 24-26 used and 31-32 used.      Pt reports he would siphon off morphine from used poppy pods. It was legal and it still is legal.   Oral   PCP/  Other Hallucinogens     yes           16 Date: 18  Amount:   Time:      # Days Used Past 30 Days:  0  Used 4 times  Oral   Meth/Amphetamine  Other Stimulants (Ecstasy/Other Club Drugs)     yes           29     Date:  "2/3/2018     Amount:   40mg     # Days Used Past 30 Days:  0  Pt reports he has used meth and amphetamines.      He reports he tried cocaine, really liked it. He went to amphetamines due to lack of availability of cocaine.     He then went to meth after amphetamines due to meth being cheaper.   Oral   Snort   Benzodiazapines     yes           29 Date: 2 weeks ago, Ativan     # Days Used Past 30 Days:  2  Pt reports he used benzos to come down from stimulants.   Oral   Ketamine/  Other Tranquilizers     no           Barbiturates/  Sedatives/  Hypnotics     no           Inhalants     no           Over-the-Counter Drugs     no           Other     no           Nicotine/Tobacco     yes           20 Date: 1/2 ppd  Amount:      # Days Used Past 30 Days:  30  1/2 ppd  Smoke      Current/Hx of withdrawal symptoms:   Sweats, hot and cold flashes, nervousness, depression, GI distress, insomnia, restless legs.      Current Risk of withdrawal (if yes, identify substance):  no     Client Impressions:   Regan identifies his primary substance as: Other Opiates Synthetic.  Amphetamines     Impact:  Regan reports the following life areas have been negatively impacted by his substance use:  family problems, financial problems, occupational / vocational problems and relationship problems.      Regan reports his substance use has been a problem and has been out of control.     Regan associates his substance use with the following leisure activities: every leisure activity      Regan attributes his relapses/continued use to: lack of passion for anything, the only way to get motivated.       Regan reports his substance use and mental health have influenced each other in the following way(s): anxiety driven substance use, \"do not feel a lot otherwise\".      Concern/Support:  Regan identifies the following people who have been concerned about his substance use and/or have been supportive of his recovery in the past 30 days: " mother, everyone.     Regan reports a history of trying to hide his substance use from others.        Regan does not agree to have  contact collateral resources to obtain information.  Release of Information has not been obtained.     Recovery Goals:  Regan reports a goal to be abstinent.      Regan reports the following period(s) of abstinence: Lifetime:  3.5 years  In past 6 months:  current 2/3     Regan identifies the following coping skills/strategies to prevent relapse of substance use or mental health instability:reading, music, attending meeting.      Regan denies attending voluntary self-helps support groups.  Last attended AA 3-4 months ago. Regan does not have a sponsor.      DSM-5 Criteria Substance Use Disorder Criteria: At least two criteria must be met within a twelve month period.     Impaired Control:    Yes  Other Opiates Synthetic Methamphetamine  1. Substance is taken in larger amounts or over a longer period than was intended.                        Yes  Other Opiates Synthetic Methamphetamine  2. There is a persistent desire or unsuccessful efforts to cut down or control use.   Yes  Other Opiates Synthetic Methamphetamine  3. A great deal of time is spent in activities necessary to obtain substance, use substance, or recover from its effects.   Yes  Other Opiates Synthetic  Methamphetamine 4. Craving, or a strong desire or urge to use the substance.     Social Impairment:    Yes  Other Opiates Synthetic Methamphetamine  5. Recurrent substance use resulting in failure to fulfill major role obligations at work, school or home.   Yes  Other Opiates Synthetic Methamphetamine 6. Continued substance use despite having persistent or recurrent social or interpersonal problems caused or exacerbated by the effects of the substance.   Yes  Other Opiates Synthetic Methamphetamine 7.Important social, occupational, or recreational activities are given up or reduced because of the  substance use.       Risky Use:   No    8. Recurrent substance use in situations in which it is physically hazardous.   Yes  Other Opiates Synthetic Methamphetamine 9. Substance use is continued despite knowledge of having a persistent or recurrent physical or psychological problem that is likely to have been caused or exacerbated by the substance.      Pharmacological:  Yes  Other Opiates Synthetic  10. Tolerance, as defined by either of the following:                         a. A need for markedly increased amounts of the substance to achieve intoxication or                 desired effect.                         b. A markedly diminished effect with continued use of the same amount of the substance.  Yes  Other Opiates Synthetic  11. Withdrawal, as manifested by either of the following:                          a. The characteristic withdrawal syndrome for the substance.                         b. Substance (or a closely related substance) is taken to relieve or avoid withdrawal symptoms.      Mild: Presence of 2-3 symptoms  Moderate: Presence of 4-5 symptoms  Severe: Presence of 6 or more symptoms.     Specify if :  In early remission - no criteria met (except craving) for at least 3 months and less than 12 months  In sustained remission - no criteria met (except craving) for 12 months or longer               In a controlled environment - individual is in an environment where access to the substance is restricted.     Regan met 10 of 11 criteria for a Opiate (Morphine) use disorder, Severe   and 8 of 11 for a Methamphetamine Use Disorder, Severe     Regan does agree to follow treatment recommendations including abstinence and regular attendance.       Regan reports motivation/primary condition leading to admission to treatment: None.     Legal History:    Regan reported that he has not been involved with the legal system.   History of arrests, intermediate or custodial include: Violating an OFP     Regan denies  current/history of having a 's license revoked because of an incident involving alcohol or other substances. License is: Active.     Regan reports currently being on probation X 1 year     Legal Status involving Children:   Regan denies having children.  ________________________________________________________________________    Life Situation (Employment/School/Finances/Basic Needs):   Since the initial DA, Regan:  denies changes in his living situation. Went back and forth. He has been at his mother's for the past 40 days.    reports changes in his employment. 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 He is unemployed.    reports changes regarding financial concerns or gambling behavior. He reports he has financial stress.   denies  changes in education status.   reports ability to meet his basic needs.     Social/Family History:   Since the initial DA, the Regan denies changes with his relationships/support system.     Significant Losses / Trauma / Abuse / Neglect Issues:   Since the initial DA, Regan denies new losses/trauma/abuse/neglect issues.     Oriental orthodox Preferences/Spiritual Beliefs/Cultural Considerations:  Since the initial DA, Regan denies new experiences of cultural bias. Regan does not identify changes or new cultural influences that may need to be considered for treatment.     Strengths/Vulnerabilities:   Since the initial DA, Regan denies changes or new strengths/vulnerabilities.     Medical History / Physical Health Screen Update:     Primary Care Physician: Regan has a non-[place] Primary Care Provider. Their PCP is Dr Adriana IVY @ [facility]..   Last Physical Exam: greater than a year ago and client was encouraged to schedule an exam with PCP.    Mental Health Medication Management Provider / Psychiatrist: Regan has a psychiatrist whose name and location are: Dr Mills @ [facility].     Last visit: 2 weeks ago        Next visit: next  week    Current medications including prescription, non-prescription, herbals, dietary aids and vitamins:  Per client report:   Outpatient Prescriptions Marked as Taking for the 3/15/18 encounter (Hospital Encounter) with Miguel Tan LP   Medication Sig     CLONIDINE HCL PO      HYDROXYZINE HCL PO      buprenorphine HCl-naloxone HCl (SUBOXONE) 4-1 MG per film Place 1 Film under the tongue daily       Regan reports current medications are: Not effective: .   Regan describes taking his medications as: Independent.  Regan reports taking prescribed medications as prescribed.     Regan provides the following current assessment of pain:  ; Pain Score: No Pain (0) (no pain);  .     Regan provides the following information regarding past significant medical conditions/diagnoses:      Medical:  Past Medical History:   Diagnosis Date     Opioid dependence (H)        Surgical:  No past surgical history on file.  Allergy:   Regan reports   Allergies   Allergen Reactions     Seasonal Allergies         Family History of Medical, Mental Health and/or Substance Use problems:  Per client report:   Family History   Problem Relation Age of Onset     Depression Mother      Anxiety Disorder Mother      Depression Father      Anxiety Disorder Father      Depression Maternal Grandmother      Anxiety Disorder Maternal Grandmother      Substance Abuse Maternal Grandmother      Dementia Maternal Grandmother      Depression Maternal Grandfather      Anxiety Disorder Maternal Grandfather      Substance Abuse Maternal Grandfather      Depression Paternal Grandmother      Anxiety Disorder Paternal Grandmother      Suicide Paternal Grandmother      Depression Paternal Grandfather      Anxiety Disorder Paternal Grandfather      Substance Abuse Paternal Grandfather        Regan reports no current medical concerns.      General Health:   Have you had any exposure to any communicable disease in the past 2-3 weeks?  no     Since the initial Diagnostic Assessment, do you have any sexual health concerns and/or any possibility of pregnancy? no       Nutrition:    Since the initial Diagnostic Assessment, are there any changes in the following areas? *special diet? If yes, please explain  *concerns regarding nutritional status?  *appetite changes in the past 3 months?  *eating disorder symptoms and/or eating disorder program? no   Have you had any weight loss or weight gain in the last 3 months?  No     NOTE: BMI to be calculated following program admission.    Fall Risk:   Have you had any falls in the past 3 months? no     Do you currently useany assistive devices for mobility?   no     NOTE: If client reports 3 or more falls in the past 3 months, the client will not be accepted into the program until further assessment is completed by the program nurse. Check if a nurse is available to assess at time of DA.    NOTE: If client reports 2 falls in the past 3 months and/or the client currently uses assistive devices for mobility, the  will send an in-basket to the program nurse to meet with the client within the first week of programming.    Head Injury/Trauma:   Since the initial Diagnostic Assessment, have you experienced head injury / trauma and/or cognitive impairment? no       Per completion of the Medical History / Physical Health Screen, is there a recommendation to see / follow up with a primary care physician/clinic?    Yes, Recommendations:   medications    Clinical Findings     Mental Status Assessment/Clinical Observation:  Appearance:   awake, alert  Eye Contact:   fair  Psychomotor Behavior: Restless    Attitude:   Cooperative    Oriented to:   All    Speech   Rate / Production: Pressured    Volume:  Normal   Mood:    Anxious  Depressed  Irritable     Affect:    Constricted      Thought Content:  Clear  passive suicidal ideation present  Thought Form:  logical no loose associations  Insight:    fair    Judgment:      fair  Attention Span/Concentration: fair  Recent and Remote Memory:  intact      Psychiatric Diagnosis:    296.32 (F33.1) Major Depressive Disorder, Recurrent Episode, Moderate  300.02 (F41.1) Generalized Anxiety Disorder  304.00 (F11.20) Opioid Use Disorder  Severe  304.40 (F15.20) Amphetamine type substance, Severe,       Provisional Diagnostic Hypothesis (Explain R/O, other Provisional Diagnosis, and why alternative Diagnosis that were considered were ruled out):   R/O panic d/o and Agoraphobia: Pt reports sx of both dx and further examination may meet the criteria for these diagnoses.     Medical Concerns that may Impact Treatment:   NONE    Psychosocial and Contextual Factors (V-Codes):  V62.29 Other problem related to employment unemployed, V60.9 Unspecified housing or economic problem living with his mother and that has been stressful. , V62.5 Problems related to other legal circumstances current OFP violation pending and V62.9 Unspecified problem related to social environment low social support, stress with his mother.     WHODAS 2.0 SCORE: 44/97.2 %  Client and family participation in assessment:   Regan was alone during this assessment.   This assessment does not include collateral information.      Summary & Recommendations  Provide a brief summary of how diagnostic criteria is met (symptoms, duration & functional impairment), cause, prognosis, and likely consequences of symptoms. Include overview of pertinent client strengths, cultural influences, life situations, relationships, health concerns and how diagnosis interacts/impacts with client's life. Recommendations include: client preferences, prioritization of needed mental health, ancillary or other services and any referrals to services required by statute or rule.     Regan was referred to MI/CD Treatment (MICD) by self. Pt reports he has a hx here and is comfortable coming back to [place]. He was in the program a day 7/2017 and was using so  "he went to residential treatment at [facility] 9/17-10/17. Regan reports he would like \"More stability for my life, want more stability, make more of a life for myself.\"     Pt reports he went to custodial for the first time 6 weeks ago. He reports his mother had an order for protection and called his mom. He had an argument with his mother, police were called and he went to custodial. Pt reports he is now living there and OFP has been dropped.     Pt reports he has had sx this intense for the last 3-4 months. Pt reports his mood fluctuates a lot, has a lack of control how he feels, and is very anxious. He reports he has had some panic attacks. Pt reports he can be quick to anger sometimes. Pt reports he is \"not really\" having suicidal thoughts. He said \"it's just hard.\" He reports the last time he had S/I was a week ago. Pt denies a hx of attempts. Pt reports his concentration is \"very bad.\" He reports his memory is \"pretty good.\" Pt reports appetite is WNL. Pt reports his sleep is variable. He reports broken sleep some nights. He has insomnia sometimes. Pt reports his energy is low and motivation is low. Pt reports he has had arguments with his mother recently and broken some stuff. He threw a cup. Pt reports no psychosis. Pt reports hx of emotional trauma in childhood. He reports sometimes he has flashbacks to it. Pt reports he feels hopeless, worthless, tired, nervous, has trouble leaving the house, is uneasy in crowds, cries easily. Client consented to co-developed safety plan, which includes coping skills and resources.    Pt reports recent methamphetamine use and last used 2/3/18. He reports he last used benzodiazepines 2 weeks ago.     FROM 7/19/2017: Regan is a 32 year-old male referred [facility]CD by [facility]. Pt reports he has been a drug user for a long time. He reports he uses drugs to self-medicate. He reports recent stimulant abuse and a hx of opiate use disorder. He reports he feels he is at a " "point where he want to address his mental health. Pt reports he was in the ED recently and not admitted. He reports drug use led to his visit to the ED. He reports his mother was concerned about him due to his drug use. Pt admits he was agitated. He had been using amphetamines. Pts goals include \"I hope to get some firm dx, work in direction to figure out what is motivating the problems in my life...why I keep doing the same cycles over and over.\" Pt reports stressors as he lives with his mother who has her own health issues (dementia) which can be a stress. He reports financial stress due to being out of work. He reports he plans to return to work at the same job. He reports he has been on leave for the past three weeks due to mental health issues.      Pt reports long-standing sx since adolescence. Pt reports his mood is low, but stable. Pt reports he has moderate anxiety. Pt reports he has no S/I, denies a hx of S/I. Pt reports he has \"a lot\" of trouble with concentration. He reports memory is OK. Pt reports he has been eating a lot. He has gained 5 pounds over the past 3 weeks. Pt reports he sleeps 9 hours a night.  Pt reports his energy is variable, low yesterday. Pt reports he lacks motivation. Pt reports irritability, feels tense, uneasy in crowds, impulsiveness, and worry.     Pt reports he has a physician following him for suboxone-Dr Lina IVY. He sees a therapist also. He denies having a psychiatrist. He has has stopped all of his mental health meds. He is open to recommendations to take other medications from our physician.      Pt denies cultural issues that would impact treatment. He denies medical concerns.      Pt reports his strengths are: compassionate, loyal, resourceful.         Prognosis is Guarded. Without the recommended intervention, the client is likely to experience the following consequences of their symptoms: Pt will need residential treatment without MICD IOP.     Referrals to services " required by statute or rule:   Report to child/adult protection services was NA.      Program Recommendation: MI/CD Treatment (MICD) .       Assessment Completed by: Miguel Tan MA [facility]

## 2018-03-19 ENCOUNTER — TELEPHONE (OUTPATIENT)
Dept: BEHAVIORAL HEALTH | Facility: CLINIC | Age: 33
End: 2018-03-19

## 2018-03-19 NOTE — TELEPHONE ENCOUNTER
Phoned pt, he stated he could not make it in today. He has court tomorrow. We rescheduled his start date to 3/22/2018 9AM.

## 2018-03-22 ENCOUNTER — HOSPITAL ENCOUNTER (OUTPATIENT)
Dept: BEHAVIORAL HEALTH | Facility: CLINIC | Age: 33
End: 2018-03-22
Attending: PSYCHIATRY & NEUROLOGY
Payer: COMMERCIAL

## 2018-03-22 PROCEDURE — H2012 BEHAV HLTH DAY TREAT, PER HR: HCPCS

## 2018-03-22 ASSESSMENT — ANXIETY QUESTIONNAIRES
3. WORRYING TOO MUCH ABOUT DIFFERENT THINGS: NEARLY EVERY DAY
6. BECOMING EASILY ANNOYED OR IRRITABLE: SEVERAL DAYS
IF YOU CHECKED OFF ANY PROBLEMS ON THIS QUESTIONNAIRE, HOW DIFFICULT HAVE THESE PROBLEMS MADE IT FOR YOU TO DO YOUR WORK, TAKE CARE OF THINGS AT HOME, OR GET ALONG WITH OTHER PEOPLE: VERY DIFFICULT
7. FEELING AFRAID AS IF SOMETHING AWFUL MIGHT HAPPEN: SEVERAL DAYS
2. NOT BEING ABLE TO STOP OR CONTROL WORRYING: MORE THAN HALF THE DAYS
1. FEELING NERVOUS, ANXIOUS, OR ON EDGE: NEARLY EVERY DAY
5. BEING SO RESTLESS THAT IT IS HARD TO SIT STILL: SEVERAL DAYS
GAD7 TOTAL SCORE: 13

## 2018-03-22 ASSESSMENT — PATIENT HEALTH QUESTIONNAIRE - PHQ9: 5. POOR APPETITE OR OVEREATING: MORE THAN HALF THE DAYS

## 2018-03-22 NOTE — PROGRESS NOTES
"Adult Mental Health Outpatient Group Therapy Progress Note     Client Initial Individualized Goals for Treatment: \"More stability for my life, want more stability, make more of a life for myself.\"       See Initial Treatment suggestions for the client during the time between Diagnostic Assessment and completion of the Master Individualized Treatment Plan.    Treatment Goals:     see above     Area of Treatment Focus:  Symptom Management, Community Resources/Discharge Planning and Abstinence/Relapse Prevention    Therapeutic Interventions/Treatment Strategies:  Support, Feedback, Structured Activity, Problem Solving, Clarification, Education, Motivational Enhancement Therapy and Aftercare Coping Cards    Response to Treatment Strategies:  Accepted Feedback, Listened, Focused on Goals, Accepted Support and Alert    Name of Group:  Self Support Skills 11:00 - 11:50     Description and Outcome:  Rajinder participated in group that focused on beginning to work on completing a \"first aid kit\" for mental and chemical health management by making aftercare coping cards to utilize for relapse management. He was quiet, with head down for much of group, but did note he was listening and understood the concept of working on relapse management and having information pertinent to himself and things he could do to manage both his mental and chemical health. He worked quietly while peers were taking about DBT groups and meetings for support. Will continue to assess.    Is this a Weekly Review of the Progress on the Treatment Plan?  No    "

## 2018-03-23 ENCOUNTER — TELEPHONE (OUTPATIENT)
Dept: BEHAVIORAL HEALTH | Facility: CLINIC | Age: 33
End: 2018-03-23

## 2018-03-23 ASSESSMENT — PATIENT HEALTH QUESTIONNAIRE - PHQ9: SUM OF ALL RESPONSES TO PHQ QUESTIONS 1-9: 17

## 2018-03-23 ASSESSMENT — ANXIETY QUESTIONNAIRES: GAD7 TOTAL SCORE: 13

## 2018-03-23 NOTE — TELEPHONE ENCOUNTER
Rajinder was a no call/no show today for program for his second day in the program. Call placed and left a message inquiring re: the reason for the absence, requesting he let us know, and hope to see him in program on Monday. Will monitor attendance and coordinate as needed.

## 2018-03-26 ENCOUNTER — TELEPHONE (OUTPATIENT)
Dept: BEHAVIORAL HEALTH | Facility: CLINIC | Age: 33
End: 2018-03-26

## 2018-03-26 NOTE — TELEPHONE ENCOUNTER
D: No call / no show for Tx again today.     I: LM to inquire regarding absence, check in regarding any concerns and to to ask if pt plans to return to Tx. Also, informed pt that a call would be going out to his emergency contact.    A: pending    P: Await call / return to Tx or follow up with emergency contact.    CHRISTINA Davis, [facility]  3/26/2018      I: LM for Kanchan Quarles, emergency contact, informing her that we have not had contact from pt since his 1st day. Requested a call back to confirm safety.    CHRISTINA Davis, [facility]  3/26/2018

## 2018-03-26 NOTE — TELEPHONE ENCOUNTER
"D: Received call back from Kanchan, pt's mother. MURIEL is for emergency only. Kanchan provided following information, including brief Hx:    Pt has reportedly not used substances for 6-7 weeks. He is currently taking Suboxone. Provider added Celexa and Buspar, however, pt is \"non-compliant\". Pt reportedly had his first psychotic break April 1, 2017 with methamphetamine use. He used this intermittently. Mother had an order for protection placed in January. He was out of the home for a week with psychosis secondary to substance use. They later modified the order to have him come home with Tx and follow up with an addiction counselor with. She only saw pt x2 and won't meet again until May. Pt reportedly broke the order for protection on the first night. He did not show to pre-sentencing and then had a hearing last Tuesday. The  was \"not happy\" that he skipped pre-sentencing and ordered a continuance. The hearing will be in May and he's supposed to go to pre-sentencing tomrrow. He will likely not leave the house.    Pt doesn't want to leave the house and doesn't want his mother to leave either. Pt sits in front of the TV all day. He reportedly believes the TV is sending him messages through the [employer] and through the radio.  Pt reportedly believes there is a conspiracy in [place] against him and his mother. He believes the [employer] is protecting them. He reportedly thinks pt is  Since pt discontinued his use his mental health symptoms have been worse, per mother. Pt demonstrated paranoia, flat affect, laughs inappropriately at internal stimuli and claps, but won't tell her why. UA's have been \"clean\" and mother reports pt doesn't have money (to buy drugs off internet per Hx) and does not have access to drive. Pt reportedly thinks mother is trying to give signals to people she talks to on the phone by using inflection in her voice.     On Friday, pt reportedly got ready for Tx, but then would not leave the house. " "When he returned from groups on Thursday he was reportedly overwhelmed by the group and additional internal stimuli. By Friday, pt reportedly believed that the program is now part of the conspiracy. Kanchan reports she contacted the [facility] Crisis team on Friday afternoon due to pt becoming angry with her and pounding his fists on his legs. This is not typical behavior for pt when he is angry, even with substance use. Kanchan reports that the pt was able to contain himself when the [facility] Crisis team was there and they said he didn't meet the standards for admission.     Mother is \"scared to death for Bill\". She doesn't know what to do. Mother's voice is cracking and she becomes tearful. Mother inquired regarding residential Dual Disorder Tx and psychiatry services.     I: Listened and offered support. Deferred talking about long term plans as pt needs more acute services at this time. Encouraged Kanchan to contact the [facility] Crisis team again and inform them of the above before they come out to the house OR contact 911 as she cannot get the pt to leave the house voluntarily.    A: Pt is demonstrating acute mental health symptoms that will likely not stabilize without inpatient psychiatric hospitalization. Pt does not currently have a psychiatric provider. He is reportedly not using substances at this time.    P: As noted above, pt plans to contact [facility] or to call 911. Kanchan agreed to call writer with an update.     CHRISTINA Davis, [facility]  3/26/2018        "

## 2018-03-29 ENCOUNTER — TELEPHONE (OUTPATIENT)
Dept: BEHAVIORAL HEALTH | Facility: CLINIC | Age: 33
End: 2018-03-29

## 2018-03-29 NOTE — TELEPHONE ENCOUNTER
for pt's mother, Kanchan, to inquire regarding current status. Informed Kanchan of plan to d/c pt at this time and to revisit his return after further stabilization. Requested call back.    CHRISTINA Davis, [facility]  3/29/2018

## 2018-05-23 NOTE — PROGRESS NOTES
Patient:  Regan Quarles            Adult CD Progress Note and Treatment Plan Review     Attendance  Please refer to OP BEH CD Adult Attendance Record Documentation Flowsheet    Support group attended this week: no    Reporting sobriety:  yes    Treatment Plan     Treatment Plan Review competed on:    11/30/17    Client preferred learning style: Visual; Hands on; Verbal; Demonstration     Staff Members contributing: JAKI Llanos                    Received Supervision: No    Client: contributed to goals and plan.    Client received copy of plan/revised plan: Yes    Client agrees with plan/revised plan: Yes    Changes to Treatment Plan: No    New Goals added since last review: None    Goals worked on since last review: building sober support, reconnecting in relationships, remaining in contact with sober tx friends, working at his job, remaining abstinent, honesty    Strategies effective: yes    Strategies need these changes: Continue working on above goals.     ASAM Risk Rating:    Dimension 1 0 No problems. Per pt report, his BRYNN date is: 10/31/17. Pt is on Suboxone maintenance.     Dimension 2 0 No problems.    Dimension 3 1 Pt denies any mental health diagnoses. Pt denies any suicidal ideations this week and his mood appears stable and more hopeful. Pt is trying to work on his codependency with his mother.     Dimension 4 1 Pt continues to identify the negative consequences of his chemical use. Pt reported that he has been sober for the past three weeks. Pt reported he is working on honesty in his personal inventory.    Dimension 5 3 Pt continues to build and to utilize coping skills to combat relapse triggers and cues and avoid relapse. Pt has been implementing his relapse prevention plan.     Dimension 6 2 Pt reported that he did not attend any 12-step meetings this week, and that he does not yet have a sponsor. Pt reports he is living with his mother. Pt reported that he continues to work at [employer]. Pt  reported that he remained in contact with sober peers this week.      Any changes in Vulnerable Adult Status?  No  If yes, add to treatment plan and individual abuse prevention plan.    Family Involvement:   Pt participated in the family week program with his mother while in LP.     Data:   Pt stated that he is feeling a bit anxious this evening. Pt shared that he has been choosing to focus on himself lately and to work on honesty. Pt reported that he went to work everyday this past week and that he has been sober for the past three weeks.     Intervention:   Staff facilitated group and pt actively participated and offered fellow peers feedback.    Assessment:   Pt appears to be doing better this week and seems to be engaging in life more.     Plan:  Focus on recovery environment  Monitor emotional/physical health      JAKI Singleton

## 2018-05-23 NOTE — ADDENDUM NOTE
Encounter addended by: Min Fajardo LADC on: 5/23/2018  5:02 PM<BR>     Actions taken: Sign clinical note

## 2018-05-23 NOTE — ADDENDUM NOTE
Encounter addended by: Min Fajardo LADC on: 5/23/2018  4:46 PM<BR>     Actions taken: Sign clinical note

## 2018-05-23 NOTE — PROGRESS NOTES
Patient:  Regan Quarles            Adult CD Progress Note and Treatment Plan Review     Attendance  Please refer to OP BEH CD Adult Attendance Record Documentation Flowsheet    Support group attended this week: no    Reporting sobriety:  yes    Treatment Plan     Treatment Plan Review competed on:    12/7/17    Client preferred learning style: Visual; Hands on; Verbal; Demonstration     Staff Members contributing: JAKI Llanos                    Received Supervision: No    Client: contributed to goals and plan.    Client received copy of plan/revised plan: Yes    Client agrees with plan/revised plan: Yes    Changes to Treatment Plan: No    New Goals added since last review: None    Goals worked on since last review: building sober support, reconnecting in relationships, remaining in contact with sober tx friends, working at his job, remaining abstinent, increasing internal motivation for recovery    Strategies effective: yes    Strategies need these changes: Continue working on above goals.     ASAM Risk Rating:    Dimension 1 0 No problems. Per pt report, his BRYNN date is: 10/31/17. Pt is on Suboxone maintenance.     Dimension 2 0 No problems.    Dimension 3 1 Pt denies any mental health diagnoses. Pt denies any suicidal ideations this week and his mood appears stable and more hopeful. Pt is trying to work on his codependency with his mother.     Dimension 4 1 Pt continues to identify the negative consequences of his chemical use. Pt appears to be increasing his internal motivation for long-term recovery.     Dimension 5 3 Pt continues to build and to utilize coping skills to combat relapse triggers and cues and avoid relapse. Pt has been implementing his relapse prevention plan.     Dimension 6 2 Pt reported that he did not attend any 12-step meetings this week, and that he does not yet have a sponsor. Pt reports he is living with his mother. Pt reported that he continues to work at [employer]. Pt reported that  he remained in contact with sober peers this week.      Any changes in Vulnerable Adult Status?  No  If yes, add to treatment plan and individual abuse prevention plan.    Family Involvement:   Pt participated in the family week program with his mother while in LP.     Data:   Pt stated that he is feeling agitated, anxious and bored this evening. Pt shared that he bought tickets to see a concert in [place] in a couple of months and that it is good for him to have something to look forward to. Pt talked about that the best part of being sober is being able to wake up where you left off from the day before.     Intervention:   Staff facilitated group and pt actively participated and offered fellow peers feedback.    Assessment:   Pt appears to be excited about his upcoming concert and seems able to acknowledge the positives of sobriety.     Plan:  Focus on recovery environment  Monitor emotional/physical health      JAKI Singleton

## 2018-06-01 NOTE — ADDENDUM NOTE
Encounter addended by: Min Fajardo LADC on: 6/1/2018  5:57 PM<BR>     Actions taken: Episode resolved

## 2018-06-01 NOTE — PROGRESS NOTES
CHEMICAL DEPENDENCY PHASE II SUPPLEMENT    TREATMENT COUNSELOR:  ANAMARIA Gonzales, [facility].   PROGRAM:  Adult Chemical Dependency Phase II aftercare.   ADMISSION DATE:  10/19/2017.   DISCHARGE DATE:  12/28/2017.   LAST SESSION DATE 12/07/2017.   DISCHARGE DIAGNOSIS   1.  304.00/F11.20, Opioid Use Disorder, Severe.     2.  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     LAST USE DATE:  Per patient report, Regan's last use date was 10/31/2017, however, it is unclear if this is accurate or not due to the fact that the patient had not remained sober throughout the time he was in Phase II.   DISCHARGE STATUS:  Regan Quarles did not complete the Phase II aftercare program as he stopped attending the sessions and failed to remain in contact with counseling staff.   NUMBER OF SESSIONS COMPLETED:  5.      ISSUES ADDRESSED IN TREATMENT:   DIMENSION 1:  Acute Intoxication Withdrawal Potential:  Current risk level is a 0.  Regan reported that he was on Suboxone maintenance throughout Phase II.      DIMENSION 2:  Biomedical Conditions and Complaints:  Current risk level is a 0.        DIMENSION 3:  Emotional, Behavioral, Cognitive Conditions and Complications:  Current risk level is a 1.  Regan reported that he had symptoms of anxiety and that while he was in Phase II, he continued to follow recommendations of his physician and remained medication compliant.  He also reported that he was working on codependency issues with his mother.  Regan's suicide risk rating throughout Phase II, and upon his discharge, was low/no current risk.      DIMENSION 4:  Readiness to Change:  Current risk level is a 1.  Regan continued to identify the negative consequences of his chemical use and worked to increase his internal motivation for long-term recovery; however, due to the fact that did not remain sober, nor did he complete aftercare, he is at a 1 in this dimension.      DIMENSION 5:  Relapse,  Continued Use, Continued Problem Potential:  Current risk level is a 3.  For part of the time, Regan appeared to be building and utilizing his coping skills to combat relapse triggers and warning signs and to avoid relapse.  However, he struggled with relapse while in Phase II and seemed to still be at a somewhat high risk for relapse.      DIMENSION 6:  Recovery Environment:  Current risk level is a 3.  While in aftercare, Regan reported that he did attend a few 12-step meetings; however, it was not on a regular basis and that he did not have a sponsor he had connected with.  He reported that he had been working part-time while in aftercare and residing with his mother.      PROGNOSIS:  The prognosis for Regan is unfavorable.  This prognosis can be upgraded if he follows through with the continuing care recommendations below.      LIVING ARRANGEMENTS AT DISCHARGE:  It is assumed that Regan was discharged to his home where he resides with his mother.      CONTINUING CARE RECOMMENDATIONS AND REFERRALS:   1.  Abstain from all mood-altering chemicals except those prescribed if non-addictive.   2.  Attendance at a minimum of three 12-step meetings per week.   3.  Maintain regular contact with a male sponsor.   4.  Obtain a chemical dependency and/or mental health assessment and follow all recommendations.   5.  Monitor and comply with advice of doctor regarding mental health and physical health issues.  Take all medications as prescribed.   6.  Continue to invest in building a sober support network.   7.  Continue to monitor and gain understanding of relapse triggers and stressors through the use and development of healthy coping skills.         This information has been disclosed to you from records protected by Federal confidentiality rules (42 CFR part 2). The Federal rules prohibit you from making any further disclosure of this information unless further disclosure is expressly permitted by the written  consent of the person to whom it pertains or as otherwise permitted by 42 CFR part 2. A general authorization for the release of medical or other information is NOT sufficient for this purpose. The Federal rules restrict any use of the information to criminally investigate or prosecute any alcohol or drug abuse patient.      ANAMARIA LIMON, [facility]             D: 2018   T: 2018   MT: DECLAN      Name:     SOFIE ESCOBAR   MRN:      [id number]        Account:      [id number]   :      1985           Visit Date:   2017      Document: [id number]

## 2019-03-16 ENCOUNTER — HOSPITAL ENCOUNTER (INPATIENT)
Age: 34
LOS: 13 days | Discharge: HOME | DRG: 753 | End: 2019-03-29
Attending: PSYCHIATRY & NEUROLOGY | Admitting: PSYCHIATRY & NEUROLOGY
Payer: MEDICAID

## 2019-03-16 DIAGNOSIS — F29 PSYCHOSIS, UNSPECIFIED PSYCHOSIS TYPE (HCC): Primary | ICD-10-CM

## 2019-03-16 LAB
ACETAMINOPHEN LEVEL: <5 UG/ML (ref 10–30)
ALBUMIN SERPL-MCNC: 4.5 G/DL (ref 3.5–4.6)
ALP BLD-CCNC: 92 U/L (ref 35–104)
ALT SERPL-CCNC: 25 U/L (ref 0–41)
AMPHETAMINE SCREEN, URINE: POSITIVE
ANION GAP SERPL CALCULATED.3IONS-SCNC: 16 MEQ/L (ref 9–15)
AST SERPL-CCNC: 53 U/L (ref 0–40)
BACTERIA: NEGATIVE /HPF
BARBITURATE SCREEN URINE: ABNORMAL
BASOPHILS ABSOLUTE: 0.1 K/UL (ref 0–0.2)
BASOPHILS RELATIVE PERCENT: 0.7 %
BENZODIAZEPINE SCREEN, URINE: ABNORMAL
BILIRUB SERPL-MCNC: 0.6 MG/DL (ref 0.2–0.7)
BILIRUBIN URINE: NEGATIVE
BLOOD, URINE: NEGATIVE
BUN BLDV-MCNC: 17 MG/DL (ref 6–20)
CALCIUM SERPL-MCNC: 9 MG/DL (ref 8.5–9.9)
CANNABINOID SCREEN URINE: ABNORMAL
CASTS 2: NORMAL /LPF
CASTS: NORMAL /LPF
CHLORIDE BLD-SCNC: 97 MEQ/L (ref 95–107)
CK MB: 10 NG/ML (ref 0–6.7)
CLARITY: CLEAR
CO2: 24 MEQ/L (ref 20–31)
COCAINE METABOLITE SCREEN URINE: ABNORMAL
COLOR: ABNORMAL
CREAT SERPL-MCNC: 0.81 MG/DL (ref 0.7–1.2)
CREATINE KINASE-MB INDEX: 1.7 % (ref 0–3.5)
EKG ATRIAL RATE: 83 BPM
EKG P AXIS: 49 DEGREES
EKG P-R INTERVAL: 130 MS
EKG Q-T INTERVAL: 370 MS
EKG QRS DURATION: 100 MS
EKG QTC CALCULATION (BAZETT): 434 MS
EKG R AXIS: -3 DEGREES
EKG T AXIS: 21 DEGREES
EKG VENTRICULAR RATE: 83 BPM
EOSINOPHILS ABSOLUTE: 0.3 K/UL (ref 0–0.7)
EOSINOPHILS RELATIVE PERCENT: 2 %
EPITHELIAL CELLS, UA: NORMAL /HPF (ref 0–5)
ETHANOL PERCENT: NORMAL G/DL
ETHANOL: <10 MG/DL (ref 0–0.08)
GFR AFRICAN AMERICAN: >60
GFR NON-AFRICAN AMERICAN: >60
GLOBULIN: 2.8 G/DL (ref 2.3–3.5)
GLUCOSE BLD-MCNC: 88 MG/DL (ref 70–99)
GLUCOSE URINE: NEGATIVE MG/DL
HCT VFR BLD CALC: 40.9 % (ref 42–52)
HEMOGLOBIN: 13.9 G/DL (ref 14–18)
KETONES, URINE: 15 MG/DL
LEUKOCYTE ESTERASE, URINE: NEGATIVE
LYMPHOCYTES ABSOLUTE: 1.7 K/UL (ref 1–4.8)
LYMPHOCYTES RELATIVE PERCENT: 11.1 %
Lab: ABNORMAL
MCH RBC QN AUTO: 31.2 PG (ref 27–31.3)
MCHC RBC AUTO-ENTMCNC: 34 % (ref 33–37)
MCV RBC AUTO: 91.7 FL (ref 80–100)
MONOCYTES ABSOLUTE: 1.4 K/UL (ref 0.2–0.8)
MONOCYTES RELATIVE PERCENT: 9.3 %
NEUTROPHILS ABSOLUTE: 11.7 K/UL (ref 1.4–6.5)
NEUTROPHILS RELATIVE PERCENT: 76.9 %
NITRITE, URINE: NEGATIVE
OPIATE SCREEN URINE: POSITIVE
PDW BLD-RTO: 12.4 % (ref 11.5–14.5)
PH UA: 5 (ref 5–9)
PHENCYCLIDINE SCREEN URINE: ABNORMAL
PLATELET # BLD: 242 K/UL (ref 130–400)
POTASSIUM SERPL-SCNC: 4.4 MEQ/L (ref 3.4–4.9)
PROTEIN UA: 30 MG/DL
RBC # BLD: 4.46 M/UL (ref 4.7–6.1)
RBC UA: NORMAL /HPF (ref 0–2)
SALICYLATE, SERUM: <0.3 MG/DL (ref 15–30)
SODIUM BLD-SCNC: 137 MEQ/L (ref 135–144)
SPECIFIC GRAVITY UA: 1.03 (ref 1–1.03)
TOTAL CK: 592 U/L (ref 0–190)
TOTAL PROTEIN: 7.3 G/DL (ref 6.3–8)
TSH SERPL DL<=0.05 MIU/L-ACNC: 1.11 UIU/ML (ref 0.44–3.86)
URINE REFLEX TO CULTURE: YES
UROBILINOGEN, URINE: 0.2 E.U./DL
WBC # BLD: 15.2 K/UL (ref 4.8–10.8)
WBC UA: NORMAL /HPF (ref 0–5)

## 2019-03-16 PROCEDURE — 36415 COLL VENOUS BLD VENIPUNCTURE: CPT

## 2019-03-16 PROCEDURE — 87086 URINE CULTURE/COLONY COUNT: CPT

## 2019-03-16 PROCEDURE — 6370000000 HC RX 637 (ALT 250 FOR IP): Performed by: PSYCHIATRY & NEUROLOGY

## 2019-03-16 PROCEDURE — G0480 DRUG TEST DEF 1-7 CLASSES: HCPCS

## 2019-03-16 PROCEDURE — 84443 ASSAY THYROID STIM HORMONE: CPT

## 2019-03-16 PROCEDURE — 82550 ASSAY OF CK (CPK): CPT

## 2019-03-16 PROCEDURE — 85025 COMPLETE CBC W/AUTO DIFF WBC: CPT

## 2019-03-16 PROCEDURE — 80307 DRUG TEST PRSMV CHEM ANLYZR: CPT

## 2019-03-16 PROCEDURE — 93005 ELECTROCARDIOGRAM TRACING: CPT

## 2019-03-16 PROCEDURE — 80053 COMPREHEN METABOLIC PANEL: CPT

## 2019-03-16 PROCEDURE — 6370000000 HC RX 637 (ALT 250 FOR IP): Performed by: NURSE PRACTITIONER

## 2019-03-16 PROCEDURE — 99285 EMERGENCY DEPT VISIT HI MDM: CPT

## 2019-03-16 PROCEDURE — 81001 URINALYSIS AUTO W/SCOPE: CPT

## 2019-03-16 PROCEDURE — 1240000000 HC EMOTIONAL WELLNESS R&B

## 2019-03-16 PROCEDURE — 82553 CREATINE MB FRACTION: CPT

## 2019-03-16 RX ORDER — LORAZEPAM 1 MG/1
2 TABLET ORAL ONCE
Status: COMPLETED | OUTPATIENT
Start: 2019-03-16 | End: 2019-03-16

## 2019-03-16 RX ORDER — ZIPRASIDONE HYDROCHLORIDE 80 MG/1
80 CAPSULE ORAL
Status: ACTIVE | OUTPATIENT
Start: 2019-03-16 | End: 2019-03-16

## 2019-03-16 RX ORDER — NICOTINE 21 MG/24HR
1 PATCH, TRANSDERMAL 24 HOURS TRANSDERMAL DAILY
Status: DISCONTINUED | OUTPATIENT
Start: 2019-03-16 | End: 2019-03-29 | Stop reason: HOSPADM

## 2019-03-16 RX ORDER — DEXTROAMPHETAMINE SACCHARATE, AMPHETAMINE ASPARTATE MONOHYDRATE, DEXTROAMPHETAMINE SULFATE AND AMPHETAMINE SULFATE 2.5; 2.5; 2.5; 2.5 MG/1; MG/1; MG/1; MG/1
20 CAPSULE, EXTENDED RELEASE ORAL EVERY MORNING
COMMUNITY
End: 2022-08-02

## 2019-03-16 RX ORDER — ACETAMINOPHEN 325 MG/1
650 TABLET ORAL EVERY 4 HOURS PRN
Status: DISCONTINUED | OUTPATIENT
Start: 2019-03-16 | End: 2019-03-29 | Stop reason: HOSPADM

## 2019-03-16 RX ORDER — TRAZODONE HYDROCHLORIDE 50 MG/1
50 TABLET ORAL NIGHTLY PRN
Status: DISCONTINUED | OUTPATIENT
Start: 2019-03-16 | End: 2019-03-29 | Stop reason: HOSPADM

## 2019-03-16 RX ORDER — HYDROXYZINE PAMOATE 25 MG/1
50 CAPSULE ORAL 3 TIMES DAILY PRN
Status: DISCONTINUED | OUTPATIENT
Start: 2019-03-16 | End: 2019-03-29 | Stop reason: HOSPADM

## 2019-03-16 RX ADMIN — LORAZEPAM 2 MG: 1 TABLET ORAL at 07:03

## 2019-03-16 RX ADMIN — ACETAMINOPHEN 650 MG: 325 TABLET ORAL at 21:51

## 2019-03-16 RX ADMIN — TRAZODONE HYDROCHLORIDE 50 MG: 50 TABLET ORAL at 21:51

## 2019-03-16 RX ADMIN — HYDROXYZINE PAMOATE 50 MG: 25 CAPSULE ORAL at 21:51

## 2019-03-16 ASSESSMENT — SLEEP AND FATIGUE QUESTIONNAIRES
RESTFUL SLEEP: NO
DO YOU HAVE DIFFICULTY SLEEPING: YES
DIFFICULTY ARISING: NO
DO YOU USE A SLEEP AID: NO
AVERAGE NUMBER OF SLEEP HOURS: 8
SLEEP PATTERN: DISTURBED/INTERRUPTED SLEEP
DIFFICULTY FALLING ASLEEP: YES
DIFFICULTY STAYING ASLEEP: YES

## 2019-03-16 ASSESSMENT — PAIN SCALES - GENERAL: PAINLEVEL_OUTOF10: 5

## 2019-03-16 ASSESSMENT — ENCOUNTER SYMPTOMS
COLOR CHANGE: 0
SORE THROAT: 0
RHINORRHEA: 0
CHEST TIGHTNESS: 0
WHEEZING: 0
VOMITING: 0
NAUSEA: 0
SHORTNESS OF BREATH: 0
COUGH: 0
TROUBLE SWALLOWING: 0
ABDOMINAL PAIN: 0
DIARRHEA: 0
CONSTIPATION: 0
ABDOMINAL DISTENTION: 0
BACK PAIN: 0

## 2019-03-16 ASSESSMENT — LIFESTYLE VARIABLES: HISTORY_ALCOHOL_USE: NO

## 2019-03-16 ASSESSMENT — PATIENT HEALTH QUESTIONNAIRE - PHQ9
SUM OF ALL RESPONSES TO PHQ QUESTIONS 1-9: 23
SUM OF ALL RESPONSES TO PHQ QUESTIONS 1-9: 17

## 2019-03-16 NOTE — ED NOTES
Awaiting call from  [address] [place] [address]. for bed assignment and report. Dave Jarrell  03/16/19 1041
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
## 2019-03-16 NOTE — ED NOTES
Kylah from [facility] arrived to assess placement     Xiomara To.  [place], [facility]  03/16/19 [id number]

## 2019-03-16 NOTE — ED NOTES
Jack from\" [facility]\", has completed assessment. They do not feel he is in need of detox . They also feel that he is not in need of inpatient and recommend outpatient only. Dave Jarrell  03/16/19 1998

## 2019-03-16 NOTE — PROGRESS NOTES
Patient arrived to unit via w/c accompanied by [facility] AN AFFILIATE OF [facility] staff and . Patient ambulated independently to assigned room to complete skin/contraband check in presence of GINGER staff. No areas of impairment noted, no contraband found. Patient given a tour of the unit.   Electronically signed by Kristy Conde RN on 3/16/2019 at 4:44 PM
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
## 2019-03-16 NOTE — ED NOTES
Pt is observed thrashing around in chair rubbing his head, asked if he thought Ativan would help, pt requested same and it was given he denied allergy to same     Mane Purcell RN  03/16/19 2039
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
## 2019-03-16 NOTE — ED NOTES
Awake and up to bathroom. Gait steady. Cooperative. Jack from [facility] here to assess pt.      Flaco Ydoer, MARCELINA  27/79/26 6485

## 2019-03-16 NOTE — ED NOTES
Dr Bland  orders consult with [facility] for opiate dependence and will approve D/C with their recommendations.      Jeramie Murdock RN  03/16/19 1082

## 2019-03-16 NOTE — PROGRESS NOTES
Report called by Madhu Johnson, RN - GINGER. Patient is a 33y/o male. INVOLUNTARY admit of Dr. Ashley Salgado with diagnosis of psychosis unspecified. Patient arrived with c/o depression and anxiety, recently moved here from [place]. Pending [facility]. Admitted on a [facility] bed. Odd mannerisms, anxious and fidgety. Paranoid. Delusional, believing his mother is trying to poison him. Recent conflict in home. Preoccupied. +amphetamines and opiates. ETOH negative. Reports drinking \"poppy tea\" to aide in SE of suboxone w/d. [facility] contacted, felt that he did not meet criteria for inpatient treatment or detox. Prescribed adderall, last filled 3/11/19. Not continued on 3W. Per mother- patient voiced suicidal thoughts x3 this month. Patient denies. Monitor for w/d, no protocol at this time.   EKG complete  VSS

## 2019-03-16 NOTE — ED NOTES
Ronel Castillo has completed assessment and is recommending hospitalization. They will pay for indigent bed. Diagnosis- other specified schizophrenia. Marvel Meza  03/16/19 6340

## 2019-03-17 LAB — URINE CULTURE, ROUTINE: NORMAL

## 2019-03-17 PROCEDURE — 6370000000 HC RX 637 (ALT 250 FOR IP): Performed by: PSYCHIATRY & NEUROLOGY

## 2019-03-17 PROCEDURE — 1240000000 HC EMOTIONAL WELLNESS R&B

## 2019-03-17 RX ADMIN — TRAZODONE HYDROCHLORIDE 50 MG: 50 TABLET ORAL at 20:03

## 2019-03-17 RX ADMIN — LURASIDONE HYDROCHLORIDE 20 MG: 20 TABLET, FILM COATED ORAL at 10:41

## 2019-03-17 RX ADMIN — HYDROXYZINE PAMOATE 50 MG: 25 CAPSULE ORAL at 07:03

## 2019-03-17 RX ADMIN — HYDROXYZINE PAMOATE 50 MG: 25 CAPSULE ORAL at 22:22

## 2019-03-17 NOTE — CARE COORDINATION
Brief Intervention and Referral to Treatment Summary    Patient was provided PHQ-9, AUDIT and DAST Screening:      PHQ-9 Score: 17  AUDIT Score: 0   DAST Score: 0      Patients substance use is considered     Low Risk/Healthy x  Moderate Risk  Harmful  Dependent    Patients depression is considered:     Minimal  Mild   Moderate  Moderately Severe x  Severe    Brief Education Was Provided N/A    Patient was receptive  Patient was not receptive      Brief Intervention Is Provided (Only for AUDIT or DAST) N/A    Patient reports readiness to decrease and/or stop use and a plan was discussed   Patient denies readiness to decrease and/or stop use and a plan was not discussed      Recommendations/Referrals for Brief and/or Specialized Treatment Provided to Patient   Patient denied any past or present drug/alcohol issues. As a result, no brief education/intervention was completed.     Electronically signed by Guillermina Siemens on 3/17/2019 at 2:16 PM

## 2019-03-17 NOTE — H&P
[address] MEDICINE    HISTORY AND PHYSICAL EXAM    PATIENT NAME:  Ken Joiner    MRN:  [id number]  SERVICE DATE:  3/17/2019   SERVICE TIME:  1:37 PM    Primary Care Physician: No primary care provider on file. SUBJECTIVE  CHIEF COMPLAINT:  Medical clear for inpatient psychiatry admission. Consult for medical H/P encounter. HPI:  This is a 28 y.o. male who is admitted to [address] [place] [address] for worsening symptoms of anxiety for weeks pta. Denies cp sob ha rash n v d f    PAST MEDICAL HISTORY:  No past medical history on file. PAST SURGICAL HISTORY:  No past surgical history on file. FAMILY HISTORY:  No family history on file.   SOCIAL HISTORY:    Social History     Socioeconomic History    Marital status: Single     Spouse name: Not on file    Number of children: Not on file    Years of education: Not on file    Highest education level: Not on file   Occupational History    Not on file   Social Needs    Financial resource strain: Not on file    Food insecurity:     Worry: Not on file     Inability: Not on file    Transportation needs:     Medical: Not on file     Non-medical: Not on file   Tobacco Use    Smoking status: Current Every Day Smoker     Packs/day: 1.00     Types: Cigarettes    Smokeless tobacco: Never Used   Substance and Sexual Activity    Alcohol use: Not Currently    Drug use: Yes     Comment: \"poppyseed tea\"    Sexual activity: Not on file   Lifestyle    Physical activity:     Days per week: Not on file     Minutes per session: Not on file    Stress: Not on file   Relationships    Social connections:     Talks on phone: Not on file     Gets together: Not on file     Attends Buddhism service: Not on file     Active member of club or organization: Not on file     Attends meetings of clubs or organizations: Not on file     Relationship status: Not on file    Intimate partner violence:     Fear of current or ex partner: Not on file     Emotionally abused: Not on wheezing  CARDIOVASCULAR:  Normal apical impulse, regular rate and rhythm, normal S1 and S2, no S3 or S4, and no murmur noted  ABDOMEN:  No scars, normal bowel sounds, soft, non-distended, non-tender, no masses palpated, no hepatosplenomegally  MUSCULOSKELETAL:  There is no redness, warmth, or swelling of the joints. Full range of motion noted. Motor strength is 5 out of 5 all extremities bilaterally. Tone is normal.  NEUROLOGIC:  Awake, alert, oriented to name, place and time. Cranial nerves II-XII are grossly intact. Motor is 5 out of 5 bilaterally. Cerebellar finger to nose, heel to shin intact. Sensory is intact. Babinski down going, Romberg negative, and gait is normal.  SKIN:  no bruising or bleeding, normal skin color, texture, turgor, no redness, warmth, or swelling and no jaundice    DATA:     Diagnostic tests reviewed for today's visit:    Most recent labs and imaging results reviewed. VTE Prophylaxis:     ASSESSMENT AND PLAN  Patient Active Hospital Problem List:   Psychosis [facility]) (3/16/2019)    Assessment:     Plan:   Opiate abuse        This is only a history and physical examination and not medical management. The patient is to contact and follow up with their primary care physician and go over any abnormal labs, imaging, findings, medical concerns, or conditions that we have and have not addressed during this encounter.     Plan of care discussed with: patient    SIGNATURE: ARCHIE Kamara  DATE: March 17, 2019  TIME: 1:37 PM
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
## 2019-03-17 NOTE — PROGRESS NOTES
3/17/19 @ 1401 - Patient was approached regarding completing Leisure Assessment. When asked about his top 3 leisure activities, patient stated he likes to listen to music, watch TV, and read. Patient was cooperative and engaging. He does not believe that he has any difficulties with managing feelings, anger, and/or stress. Patient does not have a mental health therapist or psychiatrist but has a goal of strengthening his community-based care prior to discharge. Patient stated he is new to the area and needs time to adjust to his new surroundings. Patient is currently unemployed and lives with his mother for support.      Electronically signed by Soraya Way on 3/17/2019 at 2:13 PM

## 2019-03-17 NOTE — PROGRESS NOTES
Pt has unkempt appearance. Has been sleeping most of day. Pt given towels, gowns and toiletries and encouraged to shower. Pt denies SI, HI and AVH. Does not show any withdrawal symptoms. Does say \"I dont feel well\". Pt cooperative and does come to the day room to eat.

## 2019-03-17 NOTE — PROGRESS NOTES
Pt showered this afternoon and said he is beginning to feel better. He spent most of his day in his room but did come out for meals. Pt had slightly better eye contact than earlier. Denies all.  Still has depression but less anxiety

## 2019-03-17 NOTE — PROGRESS NOTES
Group Therapy Note    Date: 3/17/19  Start Time:1015  End Time:  1100    Number of Participants: 9    Type of Group: Psychoeducation    Patient's Goal:  To participate in activities group. Notes: Patient declined to attend psychoeducation group at 1015 despite encouragement by staff.      Discipline Responsible: /Counselor    PADMAJA Bradshaw
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
## 2019-03-18 PROBLEM — F33.3 SEVERE EPISODE OF RECURRENT MAJOR DEPRESSIVE DISORDER, WITH PSYCHOTIC FEATURES (HCC): Status: ACTIVE | Noted: 2019-03-18

## 2019-03-18 PROBLEM — F11.23 OPIOID DEPENDENCE WITH WITHDRAWAL (HCC): Status: ACTIVE | Noted: 2019-03-18

## 2019-03-18 PROBLEM — F19.10 POLYDRUG ABUSE (HCC): Status: ACTIVE | Noted: 2019-03-18

## 2019-03-18 PROBLEM — F31.5 BIPOLAR AFFECTIVE DISORDER, DEPRESSED, SEVERE, WITH PSYCHOTIC BEHAVIOR (HCC): Status: ACTIVE | Noted: 2019-03-18

## 2019-03-18 PROCEDURE — 1240000000 HC EMOTIONAL WELLNESS R&B

## 2019-03-18 PROCEDURE — 6370000000 HC RX 637 (ALT 250 FOR IP): Performed by: PSYCHIATRY & NEUROLOGY

## 2019-03-18 PROCEDURE — 99232 SBSQ HOSP IP/OBS MODERATE 35: CPT | Performed by: PSYCHIATRY & NEUROLOGY

## 2019-03-18 RX ORDER — TRAMADOL HYDROCHLORIDE 50 MG/1
50 TABLET ORAL EVERY 12 HOURS
Status: COMPLETED | OUTPATIENT
Start: 2019-03-21 | End: 2019-03-22

## 2019-03-18 RX ORDER — TRAMADOL HYDROCHLORIDE 50 MG/1
100 TABLET ORAL EVERY 6 HOURS
Status: COMPLETED | OUTPATIENT
Start: 2019-03-18 | End: 2019-03-19

## 2019-03-18 RX ORDER — TRAMADOL HYDROCHLORIDE 50 MG/1
50 TABLET ORAL EVERY 6 HOURS
Status: COMPLETED | OUTPATIENT
Start: 2019-03-20 | End: 2019-03-21

## 2019-03-18 RX ORDER — TRAMADOL HYDROCHLORIDE 50 MG/1
75 TABLET ORAL EVERY 6 HOURS
Status: COMPLETED | OUTPATIENT
Start: 2019-03-19 | End: 2019-03-20

## 2019-03-18 RX ADMIN — LURASIDONE HYDROCHLORIDE 20 MG: 20 TABLET, FILM COATED ORAL at 09:00

## 2019-03-18 RX ADMIN — TRAZODONE HYDROCHLORIDE 50 MG: 50 TABLET ORAL at 21:11

## 2019-03-18 RX ADMIN — TRAMADOL HYDROCHLORIDE 100 MG: 50 TABLET, FILM COATED ORAL at 17:50

## 2019-03-18 RX ADMIN — TRAMADOL HYDROCHLORIDE 100 MG: 50 TABLET, FILM COATED ORAL at 11:54

## 2019-03-18 RX ADMIN — HYDROXYZINE PAMOATE 50 MG: 25 CAPSULE ORAL at 09:07

## 2019-03-18 ASSESSMENT — PAIN SCALES - GENERAL
PAINLEVEL_OUTOF10: 3
PAINLEVEL_OUTOF10: 4
PAINLEVEL_OUTOF10: 0

## 2019-03-18 NOTE — PROGRESS NOTES
Pt. refused to attend the 1000 skills group, despite staff encouragement. Electronically signed by Shanel Carrillo[address] on 3/18/2019 at 12:31 PM

## 2019-03-18 NOTE — PROGRESS NOTES
Pt. declined to attend the 0900 community meeting, despite staff encouragement. Electronically signed by Claudy Guerrero [address] on 3/18/2019 at 9:49 AM

## 2019-03-18 NOTE — PROGRESS NOTES
Group Therapy Note    Date: 3/18/19  Start Time: 8254  End Time:  1510    Number of Participants: 6    Type of Group: Psychoeducation    Patient's Goal:  To participate in mood management group. Notes: Patient declined to attend psychoeducation group at [phone] despite encouragement by staff.      Discipline Responsible: /Counselor    PADMAJA Archer

## 2019-03-18 NOTE — CARE COORDINATION
Patient did not attend group despite staff encouragement.   Electronically signed by Shereen Wallis [facility] on 3/18/2019 at 4:10 PM

## 2019-03-18 NOTE — PROGRESS NOTES
Pt refuses nicotine patch, asks for vistaril for anxiety. Pt has restless legs and states he did not sleep well last night. Patient encouraged fluids given a pitcher with ice and water.  Electronically signed by Rosangela Serrano LPN on 5/13/4753 at 5:39 AM

## 2019-03-18 NOTE — GROUP NOTE
Group Therapy Note    Date: March 17    Group Start Time: 2130  Group End Time: 2200  Group Topic: Wrap-Up    [facility]    Candi Rose LPN      Group Therapy Note    Did not attend group after much encouragement.         Signature:  Nilay Brito LPN

## 2019-03-18 NOTE — PROGRESS NOTES
Pt isolative to room, states he is depressed \"a little bit,\" denies anxiety, is reporting s/s of withdrawal but states the Ultram is helping to manage symptoms. Reporting good appetite and memory. Denies suicidal/homcidal ideation and A/V hallucination.

## 2019-03-18 NOTE — PROGRESS NOTES
BEHAVIORAL HEALTH FOLLOW-UP NOTE     3/18/2019     Patient was seen and examined in person, Chart reviewed   Patient's case discussed with staff/team    Chief Complaint: Depression, SI    Interim History:     Denies feeling paranoid or hallucination]  Feel depressed - 5/10  Not having any active SI/HI  Pt is reporting bad w/d from suboxone  Pt feel anxious  Pt has been using suboxone 2 weeks ago, but has been using opium team and morphine tablets  Evasive about the pattern of drug use    Appetite:   [] Normal/Unchanged  [] Increased  [x] Decreased      Sleep:       [] Normal/Unchanged  [] Fair       [x] Poor              Energy:    [] Normal/Unchanged  [] Increased  [x] Decreased        SI [] Present  [x] Absent    HI  []Present  [x] Absent     Aggression:  [] yes  [x] no    Patient is [x] able  [] unable to CONTRACT FOR SAFETY     PAST MEDICAL/PSYCHIATRIC HISTORY:   No past medical history on file. FAMILY/SOCIAL HISTORY:  No family history on file.   Social History     Socioeconomic History    Marital status: Single     Spouse name: Not on file    Number of children: Not on file    Years of education: Not on file    Highest education level: Not on file   Occupational History    Not on file   Social Needs    Financial resource strain: Not on file    Food insecurity:     Worry: Not on file     Inability: Not on file    Transportation needs:     Medical: Not on file     Non-medical: Not on file   Tobacco Use    Smoking status: Current Every Day Smoker     Packs/day: 1.00     Types: Cigarettes    Smokeless tobacco: Never Used   Substance and Sexual Activity    Alcohol use: Not Currently    Drug use: Yes     Comment: \"poppyseed tea\"    Sexual activity: Not on file   Lifestyle    Physical activity:     Days per week: Not on file     Minutes per session: Not on file    Stress: Not on file   Relationships    Social connections:     Talks on phone: Not on file     Gets together: Not on file     Attends Shinto service: Not on file     Active member of club or organization: Not on file     Attends meetings of clubs or organizations: Not on file     Relationship status: Not on file    Intimate partner violence:     Fear of current or ex partner: Not on file     Emotionally abused: Not on file     Physically abused: Not on file     Forced sexual activity: Not on file   Other Topics Concern    Not on file   Social History Narrative    Not on file           ROS:  [x] All negative/unchanged except if checked.  Explain positive(checked items) below:  [] Constitutional  [] Eyes  [] Ear/Nose/Mouth/Throat  [] Respiratory  [] CV  [] GI  []   [] Musculoskeletal  [] Skin/Breast  [] Neurological  [] Endocrine  [] Heme/Lymph  [] Allergic/Immunologic    Explanation:     MEDICATIONS:    Current Facility-Administered Medications:     lurasidone (LATUDA) tablet 20 mg, 20 mg, Oral, Daily, Randy Lucero MD, 20 mg at 03/18/19 0900    acetaminophen (TYLENOL) tablet 650 mg, 650 mg, Oral, Q4H PRN, Randy Lucero MD, 650 mg at 03/16/19 2151    magnesium hydroxide (MILK OF MAGNESIA) 400 MG/5ML suspension 30 mL, 30 mL, Oral, Daily PRN, Randy Lucero MD    hydrOXYzine (VISTARIL) capsule 50 mg, 50 mg, Oral, TID PRN, Randy Lucero MD, 50 mg at 03/18/19 0907    nicotine (NICODERM CQ) 21 MG/24HR 1 patch, 1 patch, Transdermal, Daily, Randy Lucero MD, Stopped at 03/17/19 0840    traZODone (DESYREL) tablet 50 mg, 50 mg, Oral, Nightly PRN, Randy Lucero MD, 50 mg at 03/17/19 2003      Examination:  BP (!) 131/95 Comment: RN notified  Pulse 120 Comment: RN notified  Temp 98 °F (36.7 °C) (Oral)   Resp 20   Ht 5' 8\" (1.727 m)   Wt 180 lb (81.6 kg)   SpO2 93%   BMI 27.37 kg/m²   Gait - steady  Medication side effects(SE): no    Mental Status Examination:    Level of consciousness:  within normal limits   Appearance:  fair grooming and fair hygiene  Behavior/Motor:  psychomotor retardation  Attitude toward examiner: cooperative  Speech:  slow   Mood: constricted, decreased range and depressed  Affect:  blunted  Thought processes:  slow   Thought content:  Suicidal Ideation:  denies suicidal ideation  Delusions:  no evidence of delusions  Perceptual Disturbance:  denies any perceptual disturbance  Cognition:  oriented to person, place, and time   Concentration poor  Insight fair   Judgement poor     ASSESSMENT:   Patient symptoms are:  [] Well controlled  [] Improving  [] Worsening  [] No change      Diagnosis:   Principal Problem:    Bipolar affective disorder, depressed, severe, with psychotic behavior ([facility]  Active Problems:    Opioid dependence with withdrawal ([facility]    Polydrug abuse ([facility]  Resolved Problems:    * No resolved hospital problems. *      LABS:    Recent Labs     03/16/19  0542   WBC 15.2*   HGB 13.9*        Recent Labs     03/16/19  0542      K 4.4   CL 97   CO2 24   BUN 17   CREATININE 0.81   GLUCOSE 88     Recent Labs     03/16/19  0542   BILITOT 0.6   ALKPHOS 92   AST 53*   ALT 25     Lab Results   Component Value Date    LABAMPH POSITIVE 03/16/2019    BARBSCNU Neg 03/16/2019    LABBENZ Neg 03/16/2019    OPIATESCREENURINE POSITIVE 03/16/2019    PHENCYCLIDINESCREENURINE Neg 03/16/2019    ETOH <10 03/16/2019     Lab Results   Component Value Date    TSH 1.110 03/16/2019     No results found for: LITHIUM  No results found for: VALPROATE, CBMZ    Treatment Plan:  Reviewed current Medications with the patient. Meds as ordered  Risks, benefits, side effects, drug-to-drug interactions and alternatives to treatment were discussed. Collateral information:   CD evaluation  Encourage patient to attend group and other milieu activities.   Discharge planning discussed with the patient and treatment team.    PSYCHOTHERAPY/COUNSELING:  [x] Therapeutic interview  [x] Supportive  [] CBT  [] Ongoing  [] Other    [x] Patient continues to need, on a daily basis, active treatment furnished directly by or requiring the supervision of inpatient psychiatric personnel      Anticipated Length of stay:            Electronically signed by Dillon Mir MD on 3/18/2019 at 11:31 AM

## 2019-03-19 PROCEDURE — 93010 ELECTROCARDIOGRAM REPORT: CPT | Performed by: INTERNAL MEDICINE

## 2019-03-19 PROCEDURE — 99232 SBSQ HOSP IP/OBS MODERATE 35: CPT | Performed by: PSYCHIATRY & NEUROLOGY

## 2019-03-19 PROCEDURE — 1240000000 HC EMOTIONAL WELLNESS R&B

## 2019-03-19 PROCEDURE — 6370000000 HC RX 637 (ALT 250 FOR IP): Performed by: PSYCHIATRY & NEUROLOGY

## 2019-03-19 RX ORDER — ACETAMINOPHEN 80 MG
TABLET,CHEWABLE ORAL ONCE
Status: DISCONTINUED | OUTPATIENT
Start: 2019-03-19 | End: 2019-03-29 | Stop reason: HOSPADM

## 2019-03-19 RX ADMIN — TRAMADOL HYDROCHLORIDE 75 MG: 50 TABLET, FILM COATED ORAL at 17:58

## 2019-03-19 RX ADMIN — TRAMADOL HYDROCHLORIDE 100 MG: 50 TABLET, FILM COATED ORAL at 00:00

## 2019-03-19 RX ADMIN — TRAZODONE HYDROCHLORIDE 50 MG: 50 TABLET ORAL at 20:33

## 2019-03-19 RX ADMIN — TRAMADOL HYDROCHLORIDE 75 MG: 50 TABLET, FILM COATED ORAL at 12:07

## 2019-03-19 RX ADMIN — TRAMADOL HYDROCHLORIDE 100 MG: 50 TABLET, FILM COATED ORAL at 05:59

## 2019-03-19 RX ADMIN — LURASIDONE HYDROCHLORIDE 20 MG: 20 TABLET, FILM COATED ORAL at 08:51

## 2019-03-19 ASSESSMENT — PAIN SCALES - GENERAL
PAINLEVEL_OUTOF10: 0

## 2019-03-19 NOTE — PROGRESS NOTES
BEHAVIORAL HEALTH FOLLOW-UP NOTE     3/19/2019     Patient was seen and examined in person, Chart reviewed   Patient's case discussed with staff/team    Chief Complaint: Depression    Interim History:     Pt report that his mood is getting better  Less depressed  Met with [facility] and they are helping him with rehab  Less anxious  Denies any active psychotic symptoms  No active SI  Appetite:   [x] Normal/Unchanged  [] Increased  [] Decreased      Sleep:       [] Normal/Unchanged  [x] Fair       [] Poor              Energy:    [x] Normal/Unchanged  [] Increased  [] Decreased        SI [] Present  [x] Absent    HI  []Present  [x] Absent     Aggression:  [] yes  [] no    Patient is [] able  [x] unable to CONTRACT FOR SAFETY     PAST MEDICAL/PSYCHIATRIC HISTORY:   No past medical history on file. FAMILY/SOCIAL HISTORY:  No family history on file.   Social History     Socioeconomic History    Marital status: Single     Spouse name: Not on file    Number of children: Not on file    Years of education: Not on file    Highest education level: Not on file   Occupational History    Not on file   Social Needs    Financial resource strain: Not on file    Food insecurity:     Worry: Not on file     Inability: Not on file    Transportation needs:     Medical: Not on file     Non-medical: Not on file   Tobacco Use    Smoking status: Current Every Day Smoker     Packs/day: 1.00     Types: Cigarettes    Smokeless tobacco: Never Used   Substance and Sexual Activity    Alcohol use: Not Currently    Drug use: Yes     Comment: \"poppyseed tea\"    Sexual activity: Not on file   Lifestyle    Physical activity:     Days per week: Not on file     Minutes per session: Not on file    Stress: Not on file   Relationships    Social connections:     Talks on phone: Not on file     Gets together: Not on file     Attends Islam service: Not on file     Active member of club or organization: Not on file     Attends meetings of clubs or organizations: Not on file     Relationship status: Not on file    Intimate partner violence:     Fear of current or ex partner: Not on file     Emotionally abused: Not on file     Physically abused: Not on file     Forced sexual activity: Not on file   Other Topics Concern    Not on file   Social History Narrative    Not on file           ROS:  [x] All negative/unchanged except if checked.  Explain positive(checked items) below:  [] Constitutional  [] Eyes  [] Ear/Nose/Mouth/Throat  [] Respiratory  [] CV  [] GI  []   [] Musculoskeletal  [] Skin/Breast  [] Neurological  [] Endocrine  [] Heme/Lymph  [] Allergic/Immunologic    Explanation:     MEDICATIONS:    Current Facility-Administered Medications:     [COMPLETED] traMADol (ULTRAM) tablet 100 mg, 100 mg, Oral, Q6H, 100 mg at 03/19/19 0559 **FOLLOWED BY** traMADol (ULTRAM) tablet 75 mg, 75 mg, Oral, Q6H, 75 mg at 03/19/19 1207 **FOLLOWED BY** [START ON 3/20/2019] traMADol (ULTRAM) tablet 50 mg, 50 mg, Oral, Q6H **FOLLOWED BY** [START ON 3/21/2019] traMADol (ULTRAM) tablet 50 mg, 50 mg, Oral, Q12H, Christina Casey MD    lurasidone (LATUDA) tablet 20 mg, 20 mg, Oral, Daily, Kalyan Mars MD, 20 mg at 03/19/19 0851    acetaminophen (TYLENOL) tablet 650 mg, 650 mg, Oral, Q4H PRN, Kalyan Mars MD, 650 mg at 03/16/19 2151    magnesium hydroxide (MILK OF MAGNESIA) 400 MG/5ML suspension 30 mL, 30 mL, Oral, Daily PRN, Kalyan Mars MD    hydrOXYzine (VISTARIL) capsule 50 mg, 50 mg, Oral, TID PRN, Kalyan Mars MD, 50 mg at 03/18/19 0907    nicotine (NICODERM CQ) 21 MG/24HR 1 patch, 1 patch, Transdermal, Daily, Kalyan Mars MD, Stopped at 03/17/19 0840    traZODone (DESYREL) tablet 50 mg, 50 mg, Oral, Nightly PRN, Kalyan Mars MD, 50 mg at 03/18/19 2111      Examination:  /88   Pulse 79   Temp 98 °F (36.7 °C) (Oral)   Resp 20   Ht 5' 8\" (1.727 m)   Wt 180 lb (81.6 kg)   SpO2 96%   BMI 27.37 kg/m²   Gait - steady  Medication side effects(SE): no    Mental Status Examination:    Level of consciousness:  within normal limits   Appearance:  fair grooming and fair hygiene  Behavior/Motor:  psychomotor retardation  Attitude toward examiner:  withdrawn  Speech:  slow   Mood: depressed  Affect:  mood congruent  Thought processes:  slow   Thought content:  Suicidal Ideation:  denies suicidal ideation  Delusions:  no evidence of delusions  Perceptual Disturbance:  denies any perceptual disturbance  Cognition:  oriented to person, place, and time   Concentration intact  Insight fair   Judgement fair     ASSESSMENT:   Patient symptoms are:  [] Well controlled  [x] Improving  [] Worsening  [] No change      Diagnosis:   Principal Problem:    Bipolar affective disorder, depressed, severe, with psychotic behavior ([facility]  Active Problems:    Opioid dependence with withdrawal ([facility]    [facility]  Resolved Problems:    * No resolved hospital problems. *      LABS:    No results for input(s): WBC, HGB, PLT in the last 72 hours. No results for input(s): NA, K, CL, CO2, BUN, CREATININE, GLUCOSE in the last 72 hours. No results for input(s): BILITOT, ALKPHOS, AST, ALT in the last 72 hours. Lab Results   Component Value Date    [facility] POSITIVE 03/16/2019    BARBSCNU Neg 03/16/2019    LABBENZ Neg 03/16/2019    OPIATESCREENURINE POSITIVE 03/16/2019    PHENCYCLIDINESCREENURINE Neg 03/16/2019    ETOH <10 03/16/2019     Lab Results   Component Value Date    TSH 1.110 03/16/2019     No results found for: LITHIUM  No results found for: VALPROATE, CBMZ      Treatment Plan:  Reviewed current Medications with the patient. Risks, benefits, side effects, drug-to-drug interactions and alternatives to treatment were discussed. Collateral information:   CD evaluation  Encourage patient to attend group and other milieu activities.   Discharge planning discussed with the patient and treatment team.    PSYCHOTHERAPY/COUNSELING:  [x] Therapeutic interview  [x]

## 2019-03-19 NOTE — PROGRESS NOTES
Pt. attended the 0900 community meeting. Electronically signed by Zena Phoenix, [address] on 3/19/2019 at 10:03 AM

## 2019-03-19 NOTE — PROGRESS NOTES
Pt. refused to attend the 1000 skills group, despite staff encouragement. Electronically signed by Siddhartha Dominique, [address] on 3/19/2019 at 12:23 PM

## 2019-03-19 NOTE — PROGRESS NOTES
Pt rests in room the late afternoon. Patient states he is feeling better, the medicine (Ultram) that he is taking is making him feel better. Patient states he is not having any pain. Patient dep with sad affect, out of room for meals and a little before. Patient polite and cooperative with care. Denies SI/HI or AVH. Patient rates withdrawal as \"mild. \" Electronically signed by Gianna Andrew LPN on 2/59/1305 at 4:48 PM

## 2019-03-19 NOTE — PROGRESS NOTES
Pt denies SI/HI or AVH. Patient dep/and is better. Pt continues on the tapered Tramadol CINA protocol. Pt has been out of his room more today. Pt polite and cooperative with care. Pt is seen in psychotherapy group today.   Electronically signed by Reymundo Fink LPN on 8/46/6658 at 42:04 AM

## 2019-03-19 NOTE — GROUP NOTE
Group Therapy Note    Date: March 19    Group Start Time: 1100  Group End Time: 1200  Group Topic: Psychotherapy    Patient Goals: Patient will identify benefits of properly utilizing an effective support group    Notes: Patient could identify benefits of properly utilizing an effective support group    Status After Intervention:  Improved    Participation Level:  Active Listener    Participation Quality: Attentive      Speech:  normal      Thought Process/Content: Logical      Affective Functioning: Congruent      Mood: euthymic      Level of consciousness:  Alert      Response to Learning: Progressing to goal      Endings: None Reported    Modes of Intervention: Support      Discipline Responsible: /Counselor      Signature:  MARK Jones

## 2019-03-19 NOTE — PROGRESS NOTES
Group Therapy Note    Date: 3/19/19  Start Time: 1401  End Time:  9306    Number of Participants:9    Type of Group: Psychoeducation    Patient's Goal: To participate in mood management group. Notes: Patient declined to attend psychoeducation group at  despite encouragement by staff.      Discipline Responsible: /Counselor    PADMAJA Villarreal

## 2019-03-20 LAB
BASOPHILS ABSOLUTE: 0 K/UL (ref 0–0.2)
BASOPHILS RELATIVE PERCENT: 0.7 %
EOSINOPHILS ABSOLUTE: 0.2 K/UL (ref 0–0.7)
EOSINOPHILS RELATIVE PERCENT: 3.6 %
HCT VFR BLD CALC: 40.6 % (ref 42–52)
HEMOGLOBIN: 13.8 G/DL (ref 14–18)
LYMPHOCYTES ABSOLUTE: 1.3 K/UL (ref 1–4.8)
LYMPHOCYTES RELATIVE PERCENT: 23.6 %
MCH RBC QN AUTO: 31.7 PG (ref 27–31.3)
MCHC RBC AUTO-ENTMCNC: 34 % (ref 33–37)
MCV RBC AUTO: 93.2 FL (ref 80–100)
MONOCYTES ABSOLUTE: 0.5 K/UL (ref 0.2–0.8)
MONOCYTES RELATIVE PERCENT: 9.1 %
NEUTROPHILS ABSOLUTE: 3.5 K/UL (ref 1.4–6.5)
NEUTROPHILS RELATIVE PERCENT: 63 %
PDW BLD-RTO: 12.7 % (ref 11.5–14.5)
PLATELET # BLD: 257 K/UL (ref 130–400)
RBC # BLD: 4.35 M/UL (ref 4.7–6.1)
TOTAL CK: 32 U/L (ref 0–190)
WBC # BLD: 5.6 K/UL (ref 4.8–10.8)

## 2019-03-20 PROCEDURE — 85025 COMPLETE CBC W/AUTO DIFF WBC: CPT

## 2019-03-20 PROCEDURE — 82550 ASSAY OF CK (CPK): CPT

## 2019-03-20 PROCEDURE — 6370000000 HC RX 637 (ALT 250 FOR IP): Performed by: PSYCHIATRY & NEUROLOGY

## 2019-03-20 PROCEDURE — 1240000000 HC EMOTIONAL WELLNESS R&B

## 2019-03-20 PROCEDURE — 99232 SBSQ HOSP IP/OBS MODERATE 35: CPT | Performed by: PSYCHIATRY & NEUROLOGY

## 2019-03-20 PROCEDURE — 36415 COLL VENOUS BLD VENIPUNCTURE: CPT

## 2019-03-20 PROCEDURE — 90833 PSYTX W PT W E/M 30 MIN: CPT | Performed by: PSYCHIATRY & NEUROLOGY

## 2019-03-20 RX ADMIN — LURASIDONE HYDROCHLORIDE 20 MG: 20 TABLET, FILM COATED ORAL at 09:31

## 2019-03-20 RX ADMIN — TRAMADOL HYDROCHLORIDE 75 MG: 50 TABLET, FILM COATED ORAL at 00:04

## 2019-03-20 RX ADMIN — TRAMADOL HYDROCHLORIDE 50 MG: 50 TABLET, COATED ORAL at 12:00

## 2019-03-20 RX ADMIN — TRAMADOL HYDROCHLORIDE 75 MG: 50 TABLET, FILM COATED ORAL at 06:06

## 2019-03-20 RX ADMIN — TRAMADOL HYDROCHLORIDE 50 MG: 50 TABLET, COATED ORAL at 18:24

## 2019-03-20 ASSESSMENT — PAIN SCALES - GENERAL
PAINLEVEL_OUTOF10: 0
PAINLEVEL_OUTOF10: 0
PAINLEVEL_OUTOF10: 3
PAINLEVEL_OUTOF10: 0
PAINLEVEL_OUTOF10: 0

## 2019-03-20 NOTE — CARE COORDINATION
Called and spoke with Jack to make him aware that patient is being discharged tomorrow. apptmts are already scheduled at [facility].   Electronically signed by Radha De Jesus on 3/20/2019 at 10:01 AM

## 2019-03-20 NOTE — CARE COORDINATION
Patient did not attend group despite staff encouragement.   Electronically signed by Radha Brandt on 3/20/2019 at 3:04 PM

## 2019-03-20 NOTE — PROGRESS NOTES
Pt refuses dinner stating \"I am not hungry. \" pt encouraged to come out and eat, declines.  Electronically signed by Melanie Pleitez LPN on 3/65/9009 at 7:66 PM

## 2019-03-20 NOTE — GROUP NOTE
Group Therapy Note    Date: March 19    Group Start Time: 0830  Group End Time: 0900  Group Topic: Recreational    [facility]    Dinh Hodgson    Patient did not attend Activity Group despite staff encouragement.               Signature:  Dinh Hodgson

## 2019-03-20 NOTE — GROUP NOTE
Group Therapy Note    Date: March 19    Group Start Time: 0900  Group End Time: 0930  Group Topic: Wrap-Up    [facility]    Lashae Garcia    Patient did not participate in [address] [place] [address] despite staff encouragement.             Signature:  Lashae Garcia

## 2019-03-20 NOTE — PROGRESS NOTES
Pt isolates to room during day coming out for breakfast and lunch. Pt denies all no SI/HI or AVH. Patient feel better, sleeping better still waking intermittently. Appetite improved to being able to eat most of what he orders. Dep and anx low.  Electronically signed by Melanie Pleitez LPN on 2/66/3787 at 46:96 AM

## 2019-03-20 NOTE — PROGRESS NOTES
BEHAVIORAL HEALTH FOLLOW-UP NOTE     3/20/2019     Patient was seen and examined in person, Chart reviewed   Patient's case discussed with staff/team    Chief Complaint: Depression    Interim History:     Doing better  Less mood swings  Tolerating medication well  Interested in attending [place] on discharge  Less hopeless and worthless feeling  No active SI  Appetite:   [x] Normal/Unchanged  [] Increased  [] Decreased      Sleep:       [] Normal/Unchanged  [x] Fair       [] Poor              Energy:    [x] Normal/Unchanged  [] Increased  [] Decreased        SI [] Present  [x] Absent    HI  []Present  [x] Absent     Aggression:  [] yes  [] no    Patient is [] able  [x] unable to CONTRACT FOR SAFETY     PAST MEDICAL/PSYCHIATRIC HISTORY:   No past medical history on file. FAMILY/SOCIAL HISTORY:  No family history on file.   Social History     Socioeconomic History    Marital status: Single     Spouse name: Not on file    Number of children: Not on file    Years of education: Not on file    Highest education level: Not on file   Occupational History    Not on file   Social Needs    Financial resource strain: Not on file    Food insecurity:     Worry: Not on file     Inability: Not on file    Transportation needs:     Medical: Not on file     Non-medical: Not on file   Tobacco Use    Smoking status: Current Every Day Smoker     Packs/day: 1.00     Types: Cigarettes    Smokeless tobacco: Never Used   Substance and Sexual Activity    Alcohol use: Not Currently    Drug use: Yes     Comment: \"poppyseed tea\"    Sexual activity: Not on file   Lifestyle    Physical activity:     Days per week: Not on file     Minutes per session: Not on file    Stress: Not on file   Relationships    Social connections:     Talks on phone: Not on file     Gets together: Not on file     Attends Congregational service: Not on file     Active member of club or organization: Not on file     Attends meetings of clubs or organizations: Not on file     Relationship status: Not on file    Intimate partner violence:     Fear of current or ex partner: Not on file     Emotionally abused: Not on file     Physically abused: Not on file     Forced sexual activity: Not on file   Other Topics Concern    Not on file   Social History Narrative    Not on file           ROS:  [x] All negative/unchanged except if checked.  Explain positive(checked items) below:  [] Constitutional  [] Eyes  [] Ear/Nose/Mouth/Throat  [] Respiratory  [] CV  [] GI  []   [] Musculoskeletal  [] Skin/Breast  [] Neurological  [] Endocrine  [] Heme/Lymph  [] Allergic/Immunologic    Explanation:     MEDICATIONS:    Current Facility-Administered Medications:     [START ON 3/21/2019] lurasidone (LATUDA) tablet 40 mg, 40 mg, Oral, Daily, Skye Sam MD    pill splitter, , Does not apply, Once, Skye Sam MD    [COMPLETED] traMADol (ULTRAM) tablet 100 mg, 100 mg, Oral, Q6H, 100 mg at 03/19/19 0559 **FOLLOWED BY** [COMPLETED] traMADol (ULTRAM) tablet 75 mg, 75 mg, Oral, Q6H, 75 mg at 03/20/19 0606 **FOLLOWED BY** traMADol (ULTRAM) tablet 50 mg, 50 mg, Oral, Q6H **FOLLOWED BY** [START ON 3/21/2019] traMADol (ULTRAM) tablet 50 mg, 50 mg, Oral, Q12H, Skye Sam MD    acetaminophen (TYLENOL) tablet 650 mg, 650 mg, Oral, Q4H PRN, Andriy Hwang MD, 650 mg at 03/16/19 2151    magnesium hydroxide (MILK OF MAGNESIA) 400 MG/5ML suspension 30 mL, 30 mL, Oral, Daily PRN, Andriy Hwang MD    hydrOXYzine (VISTARIL) capsule 50 mg, 50 mg, Oral, TID PRN, Andriy Hwang MD, 50 mg at 03/18/19 0907    nicotine (NICODERM CQ) 21 MG/24HR 1 patch, 1 patch, Transdermal, Daily, Andriy Hwang MD, Stopped at 03/17/19 0840    traZODone (DESYREL) tablet 50 mg, 50 mg, Oral, Nightly PRN, Andriy Hwang MD, 50 mg at 03/19/19 2033      Examination:  /69   Pulse 80   Temp 98 °F (36.7 °C) (Oral)   Resp 20   Ht 5' 8\" (1.727 m)   Wt 180 lb (81.6 kg)   SpO2 97% BMI 27.37 kg/m²   Gait - steady  Medication side effects(SE): no    Mental Status Examination:    Level of consciousness:  within normal limits   Appearance:  fair grooming and fair hygiene  Behavior/Motor:  Less psychomotor retardation  Attitude toward examiner:  withdrawn  Speech:  slow   Mood: less depressed  Affect:  mood congruent  Thought processes:  slow   Thought content:  Suicidal Ideation:  denies suicidal ideation  Delusions:  no evidence of delusions  Perceptual Disturbance:  denies any perceptual disturbance  Cognition:  oriented to person, place, and time   Concentration intact  Insight fair   Judgement fair     ASSESSMENT:   Patient symptoms are:  [] Well controlled  [x] Improving  [] Worsening  [] No change      Diagnosis:   Principal Problem:    Bipolar affective disorder, depressed, severe, with psychotic behavior ([facility]  Active Problems:    Opioid dependence with withdrawal ([facility]    [facility]  Resolved Problems:    * No resolved hospital problems. *      LABS:    No results for input(s): WBC, HGB, PLT in the last 72 hours. No results for input(s): NA, K, CL, CO2, BUN, CREATININE, GLUCOSE in the last 72 hours. No results for input(s): BILITOT, ALKPHOS, AST, ALT in the last 72 hours. Lab Results   Component Value Date    [facility] BEHAVIORAL HEALTH POSITIVE 03/16/2019    BARBSCNU Neg 03/16/2019    LABBENZ Neg 03/16/2019    OPIATESCREENURINE POSITIVE 03/16/2019    PHENCYCLIDINESCREENURINE Neg 03/16/2019    ETOH <10 03/16/2019     Lab Results   Component Value Date    TSH 1.110 03/16/2019     No results found for: LITHIUM  No results found for: VALPROATE, CBMZ      Treatment Plan:  Reviewed current Medications with the patient. Ultram taper  Increase latuda to 40 mg  Risks, benefits, side effects, drug-to-drug interactions and alternatives to treatment were discussed. Collateral information:   CD evaluation  Encourage patient to attend group and other milieu activities.   Discharge planning discussed with the patient and treatment team.    PSYCHOTHERAPY/COUNSELING:  [x] Therapeutic interview  [x] Supportive  [] CBT  [] Ongoing  [] Other  Patient was seen 1:1 for 20 minutes, other than E&M time spent, focusing on      - coping skills techniques     - Anxiety management techniques discussed including deep breathing exercise and PMR     - discussing patients strength and weakness      - Motivational interviewing to assess the stage of change and assessing patient readiness to quit substance use.      - Focusing on negative cognition and maladaptive thoughts, which is feeding and maintaining the depression symptoms         [x] Patient continues to need, on a daily basis, active treatment furnished directly by or requiring the supervision of inpatient psychiatric personnel      Anticipated Length of stay: tomorrow            Electronically signed by Trisha Franco MD on 3/20/2019 at 10:39 AM

## 2019-03-20 NOTE — PROGRESS NOTES
Pt assessed. Denies [address]. States he feels his withdraw is doing much better. He is hoping to stay sober outpt and give up opiates. Pt has been in bed most of day stating he feels tired. C/O abdominal cramping and diarrhea. Sleeping well. Eating meals. Clean appearance. No complaints at this time. Will continue to monitor.

## 2019-03-20 NOTE — PROGRESS NOTES
Pt. declined to attend the 0900 community meeting, despite staff encouragement.  Electronically signed by Brina Stern on 3/20/2019 at 11:35 AM

## 2019-03-21 PROCEDURE — 99232 SBSQ HOSP IP/OBS MODERATE 35: CPT | Performed by: PSYCHIATRY & NEUROLOGY

## 2019-03-21 PROCEDURE — 6370000000 HC RX 637 (ALT 250 FOR IP): Performed by: PSYCHIATRY & NEUROLOGY

## 2019-03-21 PROCEDURE — 1240000000 HC EMOTIONAL WELLNESS R&B

## 2019-03-21 RX ADMIN — HYDROXYZINE PAMOATE 50 MG: 25 CAPSULE ORAL at 17:27

## 2019-03-21 RX ADMIN — TRAMADOL HYDROCHLORIDE 50 MG: 50 TABLET, COATED ORAL at 06:23

## 2019-03-21 RX ADMIN — TRAMADOL HYDROCHLORIDE 50 MG: 50 TABLET, COATED ORAL at 00:04

## 2019-03-21 RX ADMIN — TRAMADOL HYDROCHLORIDE 50 MG: 50 TABLET, COATED ORAL at 12:44

## 2019-03-21 RX ADMIN — TRAZODONE HYDROCHLORIDE 50 MG: 50 TABLET ORAL at 21:57

## 2019-03-21 RX ADMIN — LURASIDONE HYDROCHLORIDE 40 MG: 40 TABLET, FILM COATED ORAL at 08:54

## 2019-03-21 ASSESSMENT — PAIN SCALES - GENERAL
PAINLEVEL_OUTOF10: 4
PAINLEVEL_OUTOF10: 3
PAINLEVEL_OUTOF10: 0
PAINLEVEL_OUTOF10: 0

## 2019-03-21 NOTE — PROGRESS NOTES
Attends meetings of clubs or organizations: Not on file     Relationship status: Not on file    Intimate partner violence:     Fear of current or ex partner: Not on file     Emotionally abused: Not on file     Physically abused: Not on file     Forced sexual activity: Not on file   Other Topics Concern    Not on file   Social History Narrative    Not on file           ROS:  [x] All negative/unchanged except if checked.  Explain positive(checked items) below:  [] Constitutional  [] Eyes  [] Ear/Nose/Mouth/Throat  [] Respiratory  [] CV  [] GI  []   [] Musculoskeletal  [] Skin/Breast  [] Neurological  [] Endocrine  [] Heme/Lymph  [] Allergic/Immunologic    Explanation:     MEDICATIONS:    Current Facility-Administered Medications:     lurasidone (LATUDA) tablet 40 mg, 40 mg, Oral, Daily, Melina Amin MD, 40 mg at 03/21/19 0854    pill splitter, , Does not apply, Once, Melina Amin MD    [COMPLETED] traMADol (ULTRAM) tablet 100 mg, 100 mg, Oral, Q6H, 100 mg at 03/19/19 0559 **FOLLOWED BY** [COMPLETED] traMADol (ULTRAM) tablet 75 mg, 75 mg, Oral, Q6H, 75 mg at 03/20/19 0606 **FOLLOWED BY** [COMPLETED] traMADol (ULTRAM) tablet 50 mg, 50 mg, Oral, Q6H, 50 mg at 03/21/19 0623 **FOLLOWED BY** traMADol (ULTRAM) tablet 50 mg, 50 mg, Oral, Q12H, Melina Amin MD    acetaminophen (TYLENOL) tablet 650 mg, 650 mg, Oral, Q4H PRN, El Miller MD, 650 mg at 03/16/19 2151    magnesium hydroxide (MILK OF MAGNESIA) 400 MG/5ML suspension 30 mL, 30 mL, Oral, Daily PRN, El Miller MD    hydrOXYzine (VISTARIL) capsule 50 mg, 50 mg, Oral, TID PRN, El Miller MD, 50 mg at 03/18/19 0907    nicotine (NICODERM CQ) 21 MG/24HR 1 patch, 1 patch, Transdermal, Daily, El Miller MD, Stopped at 03/17/19 0840    traZODone (DESYREL) tablet 50 mg, 50 mg, Oral, Nightly PRN, El Miller MD, 50 mg at 03/19/19 2033      Examination:  BP (!) 143/90 Comment: RN notified  Pulse 85   Temp 98 °F (36.7 °C) (Oral) Resp 20   Ht 5' 8\" (1.727 m)   Wt 180 lb (81.6 kg)   SpO2 96%   BMI 27.37 kg/m²   Gait - steady  Medication side effects(SE): no    Mental Status Examination:    Level of consciousness:  within normal limits   Appearance:  fair grooming and fair hygiene  Behavior/Motor:  Less psychomotor retardation  Attitude toward examiner:  withdrawn  Speech:  slow   Mood: less depressed  Affect:  mood congruent  Thought processes:  slow   Thought content:  Suicidal Ideation:  denies suicidal ideation  Delusions:  no evidence of delusions  Perceptual Disturbance:  denies any perceptual disturbance  Cognition:  oriented to person, place, and time   Concentration intact  Insight fair   Judgement fair     ASSESSMENT:   Patient symptoms are:  [] Well controlled  [x] Improving  [] Worsening  [] No change      Diagnosis:   Principal Problem:    Bipolar affective disorder, depressed, severe, with psychotic behavior ([facility]  Active Problems:    Opioid dependence with withdrawal ([facility]    Polydrug abuse ([facility]  Resolved Problems:    * No resolved hospital problems. *      LABS:    Recent Labs     03/20/19  1054   WBC 5.6   HGB 13.8*        No results for input(s): NA, K, CL, CO2, BUN, CREATININE, GLUCOSE in the last 72 hours. No results for input(s): BILITOT, ALKPHOS, AST, ALT in the last 72 hours. Lab Results   Component Value Date    [address] POSITIVE 03/16/2019    BARBSCNU Neg 03/16/2019    LABBENZ Neg 03/16/2019    OPIATESCREENURINE POSITIVE 03/16/2019    PHENCYCLIDINESCREENURINE Neg 03/16/2019    ETOH <10 03/16/2019     Lab Results   Component Value Date    TSH 1.110 03/16/2019     No results found for: LITHIUM  No results found for: VALPROATE, CBMZ      Treatment Plan:  Reviewed current Medications with the patient. Ultram taper  Increase latuda to 40 mg  Risks, benefits, side effects, drug-to-drug interactions and alternatives to treatment were discussed.   Collateral information:   CD evaluation  Encourage patient to attend group and other milieu activities.   Discharge planning discussed with the patient and treatment team.    PSYCHOTHERAPY/COUNSELING:  [x] Therapeutic interview  [x] Supportive  [] CBT  [] Ongoing  [] Other       [x] Patient continues to need, on a daily basis, active treatment furnished directly by or requiring the supervision of inpatient psychiatric personnel      Anticipated Length of stay:  CD rehab placement          Electronically signed by Paola Finnegan MD on 3/21/2019 at 9:30 AM

## 2019-03-21 NOTE — PROGRESS NOTES
Pt. declined to attend the 0900 community meeting, despite staff encouragement.  Electronically signed by Suman Leos on 3/21/2019 at 12:03 PM

## 2019-03-21 NOTE — CARE COORDINATION
KATLYN spoke with Mother who refuses to accept Patient back in the home. Family meeting was cancelled. Mother was appreciative of this. Plan now is Patient will transfer to in-patient treatment. Mother is agreeable with the as is Patient. KATLYN contacted [facility] who will assist Patient with in-patient placement.

## 2019-03-21 NOTE — CARE COORDINATION
Patient did not attend group despite staff encouragement.   Electronically signed by Sincere Sagastume on 3/21/2019 at 11:57 AM

## 2019-03-21 NOTE — GROUP NOTE
Group Therapy Note    Date:     Group Start Time: 1000  Group End Time: 1100  Group Topic: Psychoeducation    [facility] OTIS Gutiérrez      Group Therapy Note           Patient's Goal:  ***    Notes:  ***    Status After Intervention:  {Status After Intervention:881205284}    Participation Level: {Participation Level:828283297}    Participation Quality: {[facility] PARTICIPATION QUALITY:807469821}      Speech:  {ED  CD_SPEECH:64090}      Thought Process/Content: {Thought Process/Content:113221280}      Affective Functioning: {Affective Functionin}      Mood: {Mood:333938688}      Level of consciousness:  {Level of consciousness:743167710}      Response to Learnin [facility] Responses to Learnin}      Endings: {[facility] Endings:88494}    Modes of Intervention: {[facility] Modes of Intervention:513776355}      Discipline Responsible: {[facility] Multidisciplinary:809181204}      Signature:  OTIS Polk

## 2019-03-22 PROCEDURE — 1240000000 HC EMOTIONAL WELLNESS R&B

## 2019-03-22 PROCEDURE — 6370000000 HC RX 637 (ALT 250 FOR IP): Performed by: PSYCHIATRY & NEUROLOGY

## 2019-03-22 PROCEDURE — 6370000000 HC RX 637 (ALT 250 FOR IP): Performed by: PHYSICIAN ASSISTANT

## 2019-03-22 RX ORDER — MAGNESIUM HYDROXIDE/ALUMINUM HYDROXICE/SIMETHICONE 120; 1200; 1200 MG/30ML; MG/30ML; MG/30ML
30 SUSPENSION ORAL EVERY 6 HOURS PRN
Status: DISCONTINUED | OUTPATIENT
Start: 2019-03-22 | End: 2019-03-29 | Stop reason: HOSPADM

## 2019-03-22 RX ADMIN — LURASIDONE HYDROCHLORIDE 40 MG: 40 TABLET, FILM COATED ORAL at 08:15

## 2019-03-22 RX ADMIN — TRAZODONE HYDROCHLORIDE 50 MG: 50 TABLET ORAL at 21:17

## 2019-03-22 RX ADMIN — TRAMADOL HYDROCHLORIDE 50 MG: 50 TABLET, COATED ORAL at 00:11

## 2019-03-22 RX ADMIN — HYDROXYZINE PAMOATE 50 MG: 25 CAPSULE ORAL at 16:31

## 2019-03-22 RX ADMIN — ALUMINUM HYDROXIDE, MAGNESIUM HYDROXIDE, AND SIMETHICONE 30 ML: 200; 200; 20 SUSPENSION ORAL at 17:59

## 2019-03-22 ASSESSMENT — PAIN SCALES - GENERAL: PAINLEVEL_OUTOF10: 0

## 2019-03-22 NOTE — GROUP NOTE
Group Therapy Note    Date: March 22    Group Start Time: 1430  Group End Time: 1530  Group Topic: Psychoeducation    Patient's Goal:  Patient will learn relaxation techniques    Notes:  Patient participated in a relaxation exercise    Status After Intervention:  Improved    Participation Level: Interactive    Participation Quality: Attentive      Speech:  normal      Thought Process/Content: Logical      Affective Functioning: Congruent      Mood: dysphoric      Level of consciousness:  Attentive      Response to Learning: Progressing to goal      Endings: None Reported    Modes of Intervention: Education      Discipline Responsible: /Counselor      Signature:  MARK Aden

## 2019-03-22 NOTE — PROGRESS NOTES
BEHAVIORAL HEALTH FOLLOW-UP NOTE     3/22/2019     Patient was seen and examined in person, Chart reviewed   Patient's case discussed with staff/team    Chief Complaint:\" I am still not feeling right, doc\"    Interim History:     Pt has mild withdrawal with ultram taper  No objective sign of w/d  Pt mom is not taking him home  Mom req for family meeting  Pt will be homeless and willing to go to rehab  Agreeable to meet [facility]    Appetite:   [x] Normal/Unchanged  [] Increased  [] Decreased      Sleep:       [] Normal/Unchanged  [x] Fair       [] Poor              Energy:    [x] Normal/Unchanged  [] Increased  [] Decreased        SI [] Present  [x] Absent    HI  []Present  [x] Absent     Aggression:  [] yes  [] no    Patient is [] able  [x] unable to CONTRACT FOR SAFETY     PAST MEDICAL/PSYCHIATRIC HISTORY:   No past medical history on file. FAMILY/SOCIAL HISTORY:  No family history on file.   Social History     Socioeconomic History    Marital status: Single     Spouse name: Not on file    Number of children: Not on file    Years of education: Not on file    Highest education level: Not on file   Occupational History    Not on file   Social Needs    Financial resource strain: Not on file    Food insecurity:     Worry: Not on file     Inability: Not on file    Transportation needs:     Medical: Not on file     Non-medical: Not on file   Tobacco Use    Smoking status: Current Every Day Smoker     Packs/day: 1.00     Types: Cigarettes    Smokeless tobacco: Never Used   Substance and Sexual Activity    Alcohol use: Not Currently    Drug use: Yes     Comment: \"poppyseed tea\"    Sexual activity: Not on file   Lifestyle    Physical activity:     Days per week: Not on file     Minutes per session: Not on file    Stress: Not on file   Relationships    Social connections:     Talks on phone: Not on file     Gets together: Not on file     Attends Catholic service: Not on file     Active member of club or organization: Not on file     Attends meetings of clubs or organizations: Not on file     Relationship status: Not on file    Intimate partner violence:     Fear of current or ex partner: Not on file     Emotionally abused: Not on file     Physically abused: Not on file     Forced sexual activity: Not on file   Other Topics Concern    Not on file   Social History Narrative    Not on file           ROS:  [x] All negative/unchanged except if checked.  Explain positive(checked items) below:  [] Constitutional  [] Eyes  [] Ear/Nose/Mouth/Throat  [] Respiratory  [] CV  [] GI  []   [] Musculoskeletal  [] Skin/Breast  [] Neurological  [] Endocrine  [] Heme/Lymph  [] Allergic/Immunologic    Explanation:     MEDICATIONS:    Current Facility-Administered Medications:     lurasidone (LATUDA) tablet 40 mg, 40 mg, Oral, Daily, Melina Amin MD, 40 mg at 03/22/19 0815    pill splitter, , Does not apply, Once, Melina Amin MD    acetaminophen (TYLENOL) tablet 650 mg, 650 mg, Oral, Q4H PRN, El Miller MD, 650 mg at 03/16/19 2151    magnesium hydroxide (MILK OF MAGNESIA) 400 MG/5ML suspension 30 mL, 30 mL, Oral, Daily PRN, El Miller MD    hydrOXYzine (VISTARIL) capsule 50 mg, 50 mg, Oral, TID PRN, El Miller MD, 50 mg at 03/21/19 1727    nicotine (NICODERM CQ) 21 MG/24HR 1 patch, 1 patch, Transdermal, Daily, El Miller MD, Stopped at 03/17/19 0840    traZODone (DESYREL) tablet 50 mg, 50 mg, Oral, Nightly PRN, El Miller MD, 50 mg at 03/21/19 2157      Examination:  /86   Pulse 80   Temp 98.1 °F (36.7 °C) (Oral)   Resp 16   Ht 5' 8\" (1.727 m)   Wt 180 lb (81.6 kg)   SpO2 99%   BMI 27.37 kg/m²   Gait - steady  Medication side effects(SE): no    Mental Status Examination:    Level of consciousness:  within normal limits   Appearance:  fair grooming and fair hygiene  Behavior/Motor:  Less psychomotor retardation  Attitude toward examiner:  withdrawn  Speech:  slow   Mood: less depressed  Affect:  mood congruent  Thought processes:  slow   Thought content:  Suicidal Ideation:  denies suicidal ideation  Delusions:  no evidence of delusions  Perceptual Disturbance:  denies any perceptual disturbance  Cognition:  oriented to person, place, and time   Concentration intact  Insight fair   Judgement fair     ASSESSMENT:   Patient symptoms are:  [] Well controlled  [x] Improving  [] Worsening  [] No change      Diagnosis:   Principal Problem:    Bipolar affective disorder, depressed, severe, with psychotic behavior ([facility]  Active Problems:    Opioid dependence with withdrawal ([facility]    Polydrug abuse ([facility]  Resolved Problems:    * No resolved hospital problems. *      LABS:    Recent Labs     03/20/19  1054   WBC 5.6   HGB 13.8*        No results for input(s): NA, K, CL, CO2, BUN, CREATININE, GLUCOSE in the last 72 hours. No results for input(s): BILITOT, ALKPHOS, AST, ALT in the last 72 hours. Lab Results   Component Value Date    [address] POSITIVE 03/16/2019    BARBSCNU Neg 03/16/2019    LABBENZ Neg 03/16/2019    OPIATESCREENURINE POSITIVE 03/16/2019    PHENCYCLIDINESCREENURINE Neg 03/16/2019    ETOH <10 03/16/2019     Lab Results   Component Value Date    TSH 1.110 03/16/2019     No results found for: LITHIUM  No results found for: VALPROATE, CBMZ      Treatment Plan:  Reviewed current Medications with the patient. Ultram taper  Increase latuda to 40 mg  Risks, benefits, side effects, drug-to-drug interactions and alternatives to treatment were discussed. Collateral information:   CD evaluation  Encourage patient to attend group and other milieu activities.   Discharge planning discussed with the patient and treatment team.    PSYCHOTHERAPY/COUNSELING:  [x] Therapeutic interview  [x] Supportive  [] CBT  [] Ongoing  [] Other       [x] Patient continues to need, on a daily basis, active treatment furnished directly by or requiring the supervision of inpatient psychiatric personnel      Anticipated Length of stay:  CD rehab placement          Electronically signed by Givoanny Taylor MD on 3/22/2019 at 3:27 PM

## 2019-03-22 NOTE — CARE COORDINATION
KATLYN spoke with DARIEN who said Patient is appropriate to transfer to the [facility] where he can receive assistance with maintaining sobriety in addition to mental health services while housed there. Patient will also get help with procuring permanent housing via case management services. Patient can still receive out-patient care from [facility] as that agency has a dual diagnosis program.   KATLYN left a voice message with Lizeth Bullock from the [facility] @ [phone] with the intention of referring Patient there at the time of his discharge.

## 2019-03-22 NOTE — PROGRESS NOTES
Visible for breakfast, keeping to self. Avoidant of eye contact. Denies current SI, HI or AV hallucinations. Depression 4/10, anxiety 3/10. Pt c/o feeling sweaty and insomnia.

## 2019-03-22 NOTE — PROGRESS NOTES
Pt. refused to attend the 1000 skills group, despite staff encouragement. Electronically signed by Blanca Zee, [address] on 3/22/2019 at 2:11 PM

## 2019-03-22 NOTE — PROGRESS NOTES
Pt isolating to room. Pt reports showering today. Pt presents with clean and well kept appearance. Pt reports good appetite. Pt reports good sleep. Pt rates anxiety 4/10. Pt rates depression 4/10. Pt denies SI, HI and A/V hallucinations. Pt alert and oriented x 4. Pt calm and cooperative. No s/s of distress noted. Will continue to monitor.      Electronically signed by Katie Bergman LPN on 6/74/6458 at 4:85 PM

## 2019-03-22 NOTE — CARE COORDINATION
Patient did not attend group despite staff encouragement.   Electronically signed by Jean-Claude Wilson on 3/22/2019 at 12:08 PM

## 2019-03-23 PROCEDURE — 6370000000 HC RX 637 (ALT 250 FOR IP): Performed by: PSYCHIATRY & NEUROLOGY

## 2019-03-23 PROCEDURE — 1240000000 HC EMOTIONAL WELLNESS R&B

## 2019-03-23 RX ORDER — IBUPROFEN 400 MG/1
400 TABLET ORAL EVERY 6 HOURS PRN
Status: DISCONTINUED | OUTPATIENT
Start: 2019-03-23 | End: 2019-03-29 | Stop reason: HOSPADM

## 2019-03-23 RX ORDER — DICYCLOMINE HYDROCHLORIDE 10 MG/1
10 CAPSULE ORAL EVERY 6 HOURS PRN
Status: DISCONTINUED | OUTPATIENT
Start: 2019-03-23 | End: 2019-03-29 | Stop reason: HOSPADM

## 2019-03-23 RX ADMIN — LURASIDONE HYDROCHLORIDE 40 MG: 40 TABLET, FILM COATED ORAL at 08:48

## 2019-03-23 RX ADMIN — DICYCLOMINE HYDROCHLORIDE 10 MG: 10 CAPSULE ORAL at 22:30

## 2019-03-23 RX ADMIN — DICYCLOMINE HYDROCHLORIDE 10 MG: 10 CAPSULE ORAL at 16:18

## 2019-03-23 RX ADMIN — IBUPROFEN 400 MG: 400 TABLET, FILM COATED ORAL at 17:31

## 2019-03-23 ASSESSMENT — PAIN SCALES - GENERAL: PAINLEVEL_OUTOF10: 4

## 2019-03-23 NOTE — PROGRESS NOTES
BEHAVIORAL HEALTH FOLLOW-UP NOTE     3/23/2019     Patient was seen and examined in person, Chart reviewed   Patient's case discussed with staff/team    Chief Complaint:\" I am feeling crummy\"    Interim History:     Patient says he did not sleep much (however, staff reports that he has been sleeping). He said his appetite is \"a little bit\". He denied suicidal or homicidal ideations, denied hallucinations, paranoia. He still c/o withdrawal from opioids; c/o diarrhea. Appetite:   [x] Normal/Unchanged  [] Increased  [] Decreased      Sleep:       [] Normal/Unchanged  [x] Fair       [] Poor              Energy:    [x] Normal/Unchanged  [] Increased  [] Decreased        SI [] Present  [x] Absent    HI  []Present  [x] Absent     Aggression:  [] yes  [x] no    Patient is [] able  [x] unable to CONTRACT FOR SAFETY     PAST MEDICAL/PSYCHIATRIC HISTORY:   No past medical history on file. FAMILY/SOCIAL HISTORY:  No family history on file.   Social History     Socioeconomic History    Marital status: Single     Spouse name: Not on file    Number of children: Not on file    Years of education: Not on file    Highest education level: Not on file   Occupational History    Not on file   Social Needs    Financial resource strain: Not on file    Food insecurity:     Worry: Not on file     Inability: Not on file    Transportation needs:     Medical: Not on file     Non-medical: Not on file   Tobacco Use    Smoking status: Current Every Day Smoker     Packs/day: 1.00     Types: Cigarettes    Smokeless tobacco: Never Used   Substance and Sexual Activity    Alcohol use: Not Currently    Drug use: Yes     Comment: \"poppyseed tea\"    Sexual activity: Not on file   Lifestyle    Physical activity:     Days per week: Not on file     Minutes per session: Not on file    Stress: Not on file   Relationships    Social connections:     Talks on phone: Not on file     Gets together: Not on file     Attends Nondenominational service: Not on file     Active member of club or organization: Not on file     Attends meetings of clubs or organizations: Not on file     Relationship status: Not on file    Intimate partner violence:     Fear of current or ex partner: Not on file     Emotionally abused: Not on file     Physically abused: Not on file     Forced sexual activity: Not on file   Other Topics Concern    Not on file   Social History Narrative    Not on file           ROS:  [x] All negative/unchanged except if checked.  Explain positive(checked items) below:  [] Constitutional  [] Eyes  [] Ear/Nose/Mouth/Throat  [] Respiratory  [] CV  [] GI  []   [] Musculoskeletal  [] Skin/Breast  [] Neurological  [] Endocrine  [] Heme/Lymph  [] Allergic/Immunologic    Explanation:     MEDICATIONS:    Current Facility-Administered Medications:     ibuprofen (ADVIL;MOTRIN) tablet 400 mg, 400 mg, Oral, Q6H PRN, Rosalva Patel MD    dicyclomine (BENTYL) capsule 10 mg, 10 mg, Oral, Q6H PRN, Rosalva Patel MD, 10 mg at 03/23/19 1618    aluminum & magnesium hydroxide-simethicone (MAALOX) 846-331-26 MG/5ML suspension 30 mL, 30 mL, Oral, Q6H PRN, Chanel Minor PA-C, 30 mL at 03/22/19 1759    lurasidone (LATUDA) tablet 40 mg, 40 mg, Oral, Daily, Jameson Chang MD, 40 mg at 03/23/19 0848    pill splitter, , Does not apply, Once, Jameson Chang MD    acetaminophen (TYLENOL) tablet 650 mg, 650 mg, Oral, Q4H PRN, Rosalva Patel MD, 650 mg at 03/16/19 2151    magnesium hydroxide (MILK OF MAGNESIA) 400 MG/5ML suspension 30 mL, 30 mL, Oral, Daily PRN, Rosalva Patel MD    hydrOXYzine (VISTARIL) capsule 50 mg, 50 mg, Oral, TID PRN, Rosalva Patel MD, 50 mg at 03/22/19 1631    nicotine (NICODERM CQ) 21 MG/24HR 1 patch, 1 patch, Transdermal, Daily, Rosalva Patel MD, Stopped at 03/17/19 0840    traZODone (DESYREL) tablet 50 mg, 50 mg, Oral, Nightly PRN, Rosalva Patel MD, 50 mg at 03/22/19 2117      Examination:  /66   Pulse 108   Temp 98 °F (36.7 °C)   Resp 16   Ht 5' 8\" (1.727 m)   Wt 180 lb (81.6 kg)   SpO2 95%   BMI 27.37 kg/m²   Gait - steady  Medication side effects(SE): no    Mental Status Examination:    Level of consciousness: within normal limits   Appearance:  fair grooming and fair hygiene  Behavior/Motor:  Less psychomotor retardation  Attitude toward examiner:  withdrawn  Speech: slow   Mood: less depressed  Affect:  mood congruent  Thought processes:  slow   Thought content:  Suicidal Ideation:  denies suicidal ideation  Delusions:  no evidence of delusions  Perceptual Disturbance:  denies any perceptual disturbance  Cognition:  oriented to person, place, and time   Concentration intact  Insight fair   Judgement fair     ASSESSMENT:   Patient symptoms are:  [] Well controlled  [x] Improving  [] Worsening  [] No change      Diagnosis:   Principal Problem:    Bipolar affective disorder, depressed, severe, with psychotic behavior ([facility]  Active Problems:    Opioid dependence with withdrawal ([facility]    Polydrug abuse ([facility]  Resolved Problems:    * No resolved hospital problems. *      LABS:    No results for input(s): WBC, HGB, PLT in the last 72 hours. No results for input(s): NA, K, CL, CO2, BUN, CREATININE, GLUCOSE in the last 72 hours. No results for input(s): BILITOT, ALKPHOS, AST, ALT in the last 72 hours. Lab Results   Component Value Date    [address] POSITIVE 03/16/2019    BARBSCNU Neg 03/16/2019    LABBENZ Neg 03/16/2019    OPIATESCREENURINE POSITIVE 03/16/2019    PHENCYCLIDINESCREENURINE Neg 03/16/2019    ETOH <10 03/16/2019     Lab Results   Component Value Date    TSH 1.110 03/16/2019     No results found for: LITHIUM  No results found for: VALPROATE, CBMZ      Treatment Plan:  Reviewed current Medications with the patient. Continue current meds. Add Bentyl for diarrhea, and ibuprofen for pain  Risks, benefits, side effects, drug-to-drug interactions and alternatives to treatment were discussed.   Collateral information:   DALJIT evaluation  Encourage patient to attend group and other milieu activities.   Discharge planning discussed with the patient and treatment team.    PSYCHOTHERAPY/COUNSELING:  [x] Therapeutic interview  [x] Supportive  [] CBT  [] Ongoing  [] Other       [x] Patient continues to need, on a daily basis, active treatment furnished directly by or requiring the supervision of inpatient psychiatric personnel      Anticipated Length of stay:  CD rehab placement          Electronically signed by Danielle Wilkins MD on 3/23/2019 at 5:25 PM

## 2019-03-23 NOTE — GROUP NOTE
Group Therapy Note    Date: March 23    Group Start Time: 1000  Group End Time: 1100  Group Topic: Psychoeducation    [facility]    Modesta Hollins      Group Therapy Note           Patient's Goal:  \"To feel better\"    Notes:  Patient was quiet and work fairly on his task in group. Status After Intervention:  Unchanged    Participation Level:  Active Listener    Participation Quality: Appropriate      Speech:  quiet      Thought Process/Content: Linear      Affective Functioning: Congruent      Mood: calm      Level of consciousness:  Alert      Response to Learning: Progressing to goal      Endings: None Reported    Modes of Intervention: Education, Socialization and Activity      Discipline Responsible: Psychoeducational Specialist      Signature:  Blanca Zee

## 2019-03-23 NOTE — FLOWSHEET NOTE
Denies si, hi and avh. Resting in room or watching tv most of the afternoon. Depressed, anxious and still feeling crampy, achy, restless from w/d. Does appear relax when out on the unit. Isolative to self. Only attending select groups. Poor judgement, poor insight. Focused on getting more meds for w/d.      Electronically signed by Tyler Jenkins RN on 3/23/2019 at 6:14 PM

## 2019-03-23 NOTE — GROUP NOTE
Group Therapy Note    Date: March 22    Group Start Time: 1945  Group End Time: 2030  Group Topic: Wrap-Up    [facility]    Rosalva Miller RN      Group Therapy Note           Patient's Goal: \"To feel better    Status After Intervention:  Unchanged    Participation Level:  Active Listener    Participation Quality: Appropriate and Attentive      Speech:  normal      Thought Process/Content: Logical      Affective Functioning: Blunted and Flat      Mood: anxious      Level of consciousness:  Alert and Oriented x4      Response to Learning: Able to verbalize current knowledge/experience      Endings: None Reported    Modes of Intervention: Education, Support and Socialization      Discipline Responsible: Registered Nurse      Signature:  Rosalva Miller RN

## 2019-03-23 NOTE — FLOWSHEET NOTE
Denies si, hi and avh. Brightened but expressionless affect. Asking staff for more ultram or information on suboxone. Pt does not appear to be anxious or in distress. Often sitting watching tv. Depressed mood. Still experiencing w/d sx; orders received for bentyl or motrin.        Electronically signed by Stephon Matamoros RN on 3/23/2019 at 2:07 PM

## 2019-03-24 PROCEDURE — 1240000000 HC EMOTIONAL WELLNESS R&B

## 2019-03-24 PROCEDURE — 6370000000 HC RX 637 (ALT 250 FOR IP): Performed by: PSYCHIATRY & NEUROLOGY

## 2019-03-24 RX ADMIN — LURASIDONE HYDROCHLORIDE 40 MG: 40 TABLET, FILM COATED ORAL at 08:11

## 2019-03-24 RX ADMIN — TRAZODONE HYDROCHLORIDE 50 MG: 50 TABLET ORAL at 00:26

## 2019-03-24 RX ADMIN — IBUPROFEN 400 MG: 400 TABLET, FILM COATED ORAL at 21:56

## 2019-03-24 RX ADMIN — DICYCLOMINE HYDROCHLORIDE 10 MG: 10 CAPSULE ORAL at 08:12

## 2019-03-24 RX ADMIN — IBUPROFEN 400 MG: 400 TABLET, FILM COATED ORAL at 08:11

## 2019-03-24 RX ADMIN — HYDROXYZINE PAMOATE 50 MG: 25 CAPSULE ORAL at 23:43

## 2019-03-24 ASSESSMENT — PAIN SCALES - GENERAL
PAINLEVEL_OUTOF10: 3
PAINLEVEL_OUTOF10: 3

## 2019-03-24 NOTE — PROGRESS NOTES
Pt isolative to self, out on the unit briefly and retreats to room most of the day. States he is still withdrawing even though he has been here for 8 days. Denies suicidal/homicidal ideation and A/V hallucination.

## 2019-03-24 NOTE — PROGRESS NOTES
Pt. declined to attend the 0900 community meeting, despite staff encouragement. Electronically signed by Cortez Manzano, [address] on 3/24/2019 at 10:50 AM

## 2019-03-24 NOTE — PROGRESS NOTES
BEHAVIORAL HEALTH FOLLOW-UP NOTE     3/24/2019     Patient was seen and examined in person, Chart reviewed   Patient's case discussed with staff/team    Chief Complaint:\" I am feeling crummy\"    Interim History:     Patient says he is feeling \"OK\". He said his mood is still depressed. He slept \"3-4 hours\" (per staff, he had 4.5 hours sleep). He said his appetite was \"OK\". He denied having any more suicidal, or any homicidal ideations. He denied hallucinations, paranoia. He feels overall \"a little better\". Appetite:   [x] Normal/Unchanged  [] Increased  [] Decreased      Sleep:       [] Normal/Unchanged  [x] Fair       [] Poor              Energy:    [x] Normal/Unchanged  [] Increased  [] Decreased        SI [] Present  [x] Absent    HI  []Present  [x] Absent     Aggression:  [] yes  [x] no    Patient is [] able  [x] unable to CONTRACT FOR SAFETY     PAST MEDICAL/PSYCHIATRIC HISTORY:   No past medical history on file. FAMILY/SOCIAL HISTORY:  No family history on file.   Social History     Socioeconomic History    Marital status: Single     Spouse name: Not on file    Number of children: Not on file    Years of education: Not on file    Highest education level: Not on file   Occupational History    Not on file   Social Needs    Financial resource strain: Not on file    Food insecurity:     Worry: Not on file     Inability: Not on file    Transportation needs:     Medical: Not on file     Non-medical: Not on file   Tobacco Use    Smoking status: Current Every Day Smoker     Packs/day: 1.00     Types: Cigarettes    Smokeless tobacco: Never Used   Substance and Sexual Activity    Alcohol use: Not Currently    Drug use: Yes     Comment: \"poppyseed tea\"    Sexual activity: Not on file   Lifestyle    Physical activity:     Days per week: Not on file     Minutes per session: Not on file    Stress: Not on file   Relationships    Social connections:     Talks on phone: Not on file     Gets together: Not treatment were discussed. Collateral information:   CD evaluation  Encourage patient to attend group and other milieu activities.   Discharge planning discussed with the patient and treatment team.    PSYCHOTHERAPY/COUNSELING:  [x] Therapeutic interview  [x] Supportive  [] CBT  [] Ongoing  [] Other       [x] Patient continues to need, on a daily basis, active treatment furnished directly by or requiring the supervision of inpatient psychiatric personnel      Anticipated Length of stay:  CD rehab placement          Electronically signed by Bandar Burnham MD on 3/24/2019 at 5:14 PM

## 2019-03-24 NOTE — GROUP NOTE
Group Therapy Note    Date: March 23    Group Start Time: 2130  Group End Time: 2145  Group Topic: Wrap-Up    [facility]               Patient's Goal:  \"To feel better\"    Notes:  Patient reports feeling 25% better, which he described as \"great\" improvement. When asked what has helped him feel better, he listed: time, being out of room more, and being more active/attending groups. Status After Intervention:  Improved    Participation Level:  Active Listener and Interactive    Participation Quality: Appropriate, Attentive and Sharing      Speech:  normal      Thought Process/Content: Logical      Affective Functioning: Congruent      Mood: euthymic      Level of consciousness:  Alert, Oriented x4 and Attentive      Response to Learning: Able to verbalize current knowledge/experience and Progressing to goal      Endings: None Reported    Modes of Intervention: Support and Socialization      Discipline Responsible: Behavorial Health Tech      Signature:  Dominguez Seals

## 2019-03-25 PROCEDURE — 1240000000 HC EMOTIONAL WELLNESS R&B

## 2019-03-25 PROCEDURE — 6370000000 HC RX 637 (ALT 250 FOR IP): Performed by: PSYCHIATRY & NEUROLOGY

## 2019-03-25 RX ORDER — DIPHENHYDRAMINE HCL 50 MG
50 CAPSULE ORAL EVERY 6 HOURS PRN
Status: DISCONTINUED | OUTPATIENT
Start: 2019-03-25 | End: 2019-03-29 | Stop reason: HOSPADM

## 2019-03-25 RX ADMIN — HYDROXYZINE PAMOATE 50 MG: 25 CAPSULE ORAL at 15:57

## 2019-03-25 RX ADMIN — HYDROXYZINE PAMOATE 50 MG: 25 CAPSULE ORAL at 22:12

## 2019-03-25 RX ADMIN — LURASIDONE HYDROCHLORIDE 40 MG: 40 TABLET, FILM COATED ORAL at 08:28

## 2019-03-25 ASSESSMENT — SLEEP AND FATIGUE QUESTIONNAIRES
RESTFUL SLEEP: NO
DIFFICULTY STAYING ASLEEP: YES
DIFFICULTY FALLING ASLEEP: YES
DO YOU USE A SLEEP AID: NO
DIFFICULTY ARISING: NO
AVERAGE NUMBER OF SLEEP HOURS: 6
DO YOU HAVE DIFFICULTY SLEEPING: YES
SLEEP PATTERN: DIFFICULTY FALLING ASLEEP;DISTURBED/INTERRUPTED SLEEP

## 2019-03-25 NOTE — GROUP NOTE
Group Therapy Note    Date: March 25    Group Start Time: 1105  Group End Time: 1205  Group Topic: Psychotherapy    [facility] [place], [facility]      Group Therapy Note           Patient's Goal:  To go into treatment    Notes:  Patient stated that she is working with [facility]    Status After Intervention:  Unchanged    Participation Level: Interactive    Participation Quality: Appropriate      Speech:  normal      Thought Process/Content: Logical      Affective Functioning: Congruent      Mood: anxious      Level of consciousness:  Alert      Response to Learning: Progressing to goal      Endings: None Reported    Modes of Intervention: Support      Discipline Responsible: /Counselor      Signature:  Edwardo Sykes, [facility]

## 2019-03-25 NOTE — PROGRESS NOTES
Group Therapy Note    Date: 3/25/19  Start Time: 4950  End Time:  4764    Number of Participants:7    Type of Group: Psychoeducation    Patient's Goal: To participate in mood management group. Notes: Patient declined to attend psychoeducation group at  despite encouragement by staff.      Discipline Responsible: /Counselor    PADMAJA Alba

## 2019-03-25 NOTE — PROGRESS NOTES
BEHAVIORAL HEALTH FOLLOW-UP NOTE     3/25/2019     Patient was seen and examined in person, Chart reviewed   Patient's case discussed with staff/team    Chief Complaint:\" I need few more days to stabilize my mood\"    Interim History:     Patient says he is feeling \"OK\". He said his mood is still depressed. He slept \"3-4 hours\" (per staff, he had 4.5 hours sleep). He said his appetite was \"OK\". He denied having any more suicidal, or any homicidal ideations. He denied hallucinations, paranoia. He feels overall \"a little better\". Appetite:   [x] Normal/Unchanged  [] Increased  [] Decreased      Sleep:       [] Normal/Unchanged  [x] Fair       [] Poor              Energy:    [x] Normal/Unchanged  [] Increased  [] Decreased        SI [] Present  [x] Absent    HI  []Present  [x] Absent     Aggression:  [] yes  [x] no    Patient is [] able  [x] unable to CONTRACT FOR SAFETY     PAST MEDICAL/PSYCHIATRIC HISTORY:   No past medical history on file. FAMILY/SOCIAL HISTORY:  No family history on file.   Social History     Socioeconomic History    Marital status: Single     Spouse name: Not on file    Number of children: Not on file    Years of education: Not on file    Highest education level: Not on file   Occupational History    Not on file   Social Needs    Financial resource strain: Not on file    Food insecurity:     Worry: Not on file     Inability: Not on file    Transportation needs:     Medical: Not on file     Non-medical: Not on file   Tobacco Use    Smoking status: Current Every Day Smoker     Packs/day: 1.00     Types: Cigarettes    Smokeless tobacco: Never Used   Substance and Sexual Activity    Alcohol use: Not Currently    Drug use: Yes     Comment: \"poppyseed tea\"    Sexual activity: Not on file   Lifestyle    Physical activity:     Days per week: Not on file     Minutes per session: Not on file    Stress: Not on file   Relationships    Social connections:     Talks on phone: Not on file Gets together: Not on file     Attends Denominational service: Not on file     Active member of club or organization: Not on file     Attends meetings of clubs or organizations: Not on file     Relationship status: Not on file    Intimate partner violence:     Fear of current or ex partner: Not on file     Emotionally abused: Not on file     Physically abused: Not on file     Forced sexual activity: Not on file   Other Topics Concern    Not on file   Social History Narrative    Not on file           ROS:  [x] All negative/unchanged except if checked.  Explain positive(checked items) below:  [] Constitutional  [] Eyes  [] Ear/Nose/Mouth/Throat  [] Respiratory  [] CV  [] GI  []   [] Musculoskeletal  [] Skin/Breast  [] Neurological  [] Endocrine  [] Heme/Lymph  [] Allergic/Immunologic    Explanation:     MEDICATIONS:    Current Facility-Administered Medications:     ibuprofen (ADVIL;MOTRIN) tablet 400 mg, 400 mg, Oral, Q6H PRN, Joseph Chris MD, 400 mg at 03/24/19 2156    dicyclomine (BENTYL) capsule 10 mg, 10 mg, Oral, Q6H PRN, Joseph Chris MD, 10 mg at 03/24/19 5438    aluminum & magnesium hydroxide-simethicone (MAALOX) 200-200-20 MG/5ML suspension 30 mL, 30 mL, Oral, Q6H PRN, Chanel Minor PA-C, 30 mL at 03/22/19 1759    lurasidone (LATUDA) tablet 40 mg, 40 mg, Oral, Daily, Apurva Vasquez MD, 40 mg at 03/25/19 6303    pill splitter, , Does not apply, Once, Apurva Vasquez MD    acetaminophen (TYLENOL) tablet 650 mg, 650 mg, Oral, Q4H PRN, Joseph Chris MD, 650 mg at 03/16/19 2151    magnesium hydroxide (MILK OF MAGNESIA) 400 MG/5ML suspension 30 mL, 30 mL, Oral, Daily PRN, Joseph Chris MD    hydrOXYzine (VISTARIL) capsule 50 mg, 50 mg, Oral, TID PRN, Joseph Chris MD, 50 mg at 03/25/19 1557    nicotine (NICODERM CQ) 21 MG/24HR 1 patch, 1 patch, Transdermal, Daily, Joseph Chris MD, 1 patch at 03/25/19 0828    traZODone (DESYREL) tablet 50 mg, 50 mg, Oral, Nightly PRN, Hung Villarreal Jacob Carrington MD, 50 mg at 03/24/19 5914      Examination:  /87   Pulse 76   Temp 98 °F (36.7 °C) (Oral)   Resp 18   Ht 5' 8\" (1.727 m)   Wt 180 lb (81.6 kg)   SpO2 96%   BMI 27.37 kg/m²   Gait - steady  Medication side effects(SE): no    Mental Status Examination:    Level of consciousness: within normal limits   Appearance:  fair grooming and fair hygiene  Behavior/Motor:  Less psychomotor retardation  Attitude toward examiner:  withdrawn  Speech: slow   Mood: less depressed  Affect:  mood congruent  Thought processes:  slow   Thought content:  Suicidal Ideation:  denies suicidal ideation  Delusions:  no evidence of delusions  Perceptual Disturbance:  denies any perceptual disturbance  Cognition:  oriented to person, place, and time   Concentration intact  Insight fair   Judgement fair     ASSESSMENT:   Patient symptoms are:  [] Well controlled  [x] Improving  [] Worsening  [] No change      Diagnosis:   Principal Problem:    Bipolar affective disorder, depressed, severe, with psychotic behavior ([facility]  Active Problems:    Opioid dependence with withdrawal ([facility] [address] [facility]    Polydrug abuse ([facility] [address] [facility]  Resolved Problems:    * No resolved hospital problems. *      LABS:    No results for input(s): WBC, HGB, PLT in the last 72 hours. No results for input(s): NA, K, CL, CO2, BUN, CREATININE, GLUCOSE in the last 72 hours. No results for input(s): BILITOT, ALKPHOS, AST, ALT in the last 72 hours. Lab Results   Component Value Date    [address] POSITIVE 03/16/2019    BARBSCNU Neg 03/16/2019    LABBENZ Neg 03/16/2019    OPIATESCREENURINE POSITIVE 03/16/2019    PHENCYCLIDINESCREENURINE Neg 03/16/2019    ETOH <10 03/16/2019     Lab Results   Component Value Date    TSH 1.110 03/16/2019     No results found for: LITHIUM  No results found for: VALPROATE, CBMZ      Treatment Plan:  Reviewed current Medications with the patient. Continue current meds.    Risks, benefits, side effects, drug-to-drug interactions and alternatives to treatment were discussed. Collateral information:   CD evaluation  Encourage patient to attend group and other milieu activities.   Discharge planning discussed with the patient and treatment team.  Inpatient Drug Rehab program    PSYCHOTHERAPY/COUNSELING:  [x] Therapeutic interview  [x] Supportive  [] CBT  [] Ongoing  [] Other       [x] Patient continues to need, on a daily basis, active treatment furnished directly by or requiring the supervision of inpatient psychiatric personnel      Anticipated Length of stay:  CD rehab placement          Electronically signed by Dionisio Leos MD on 3/25/2019 at 6:08 PM

## 2019-03-25 NOTE — PROGRESS NOTES
Ct has been out of room more this shift watching TV with peers. Received Motrin for nagging abd cramps 3/10. Declined Bentyl and Trazodone at HS, reports Desyrel causes nightmares. Is not endorsing suicidal  thought at this time but does request Vistaril for anxiety. Ct is homeless and only knows of [address] as a place to go.

## 2019-03-25 NOTE — PROGRESS NOTES
Pt visible out on unit for meals reports going to groups. Reports having interrupted sleep and sleeping four hours. Rates anxiety a 4/10 and states ''I am anxious about wanting to feel better''. Pt did engage in conversation with poor eye contact. denies SI,HI,A/V hallucination. pt also requested vistaril for anxiety and was medicated po.

## 2019-03-25 NOTE — GROUP NOTE
Group Therapy Note    Date: March 24    Group Start Time: 2100  Group End Time: 2120  Group Topic: Wrap-Up    [facility]    Veatrice Blocker    Patient did not attend group.

## 2019-03-25 NOTE — GROUP NOTE
Group Therapy Note    Date: March 24    Group Start Time: 2000  Group End Time: 2045  Group Topic: Recreational    [facility] COLTON Galvez    Patient did not attend Recreation group.

## 2019-03-26 PROCEDURE — 6370000000 HC RX 637 (ALT 250 FOR IP): Performed by: NURSE PRACTITIONER

## 2019-03-26 PROCEDURE — 6370000000 HC RX 637 (ALT 250 FOR IP): Performed by: PSYCHIATRY & NEUROLOGY

## 2019-03-26 PROCEDURE — 1240000000 HC EMOTIONAL WELLNESS R&B

## 2019-03-26 RX ORDER — LOPERAMIDE HYDROCHLORIDE 2 MG/1
2 CAPSULE ORAL 4 TIMES DAILY PRN
Status: DISCONTINUED | OUTPATIENT
Start: 2019-03-26 | End: 2019-03-29 | Stop reason: HOSPADM

## 2019-03-26 RX ADMIN — TRAZODONE HYDROCHLORIDE 50 MG: 50 TABLET ORAL at 22:23

## 2019-03-26 RX ADMIN — LOPERAMIDE HYDROCHLORIDE 2 MG: 2 CAPSULE ORAL at 15:50

## 2019-03-26 RX ADMIN — LURASIDONE HYDROCHLORIDE 40 MG: 40 TABLET, FILM COATED ORAL at 08:48

## 2019-03-26 NOTE — PROGRESS NOTES
Group Therapy Note    Date: 3/26/19  Start Time: 1430  End Time:  9603    Number of Participants:11    Type of Group: Psychoeducation    Patient's Goal:  To participate in mood management group. Notes: Patient declined to attend psychoeducation group at 1430 despite encouragement by staff.      Discipline Responsible: /Counselor    PADMAJA Block

## 2019-03-26 NOTE — PROGRESS NOTES
Pt. declined to attend the 0900 community meeting, despite staff encouragement. Electronically signed by Kendra Mcguire, [address] on 3/26/2019 at 11:34 AM

## 2019-03-26 NOTE — PROGRESS NOTES
Pt complaining of diarrhea, requesting imodium. Notified Hospitalist, Ashley Monroe.  Electronically signed by Brooke Jarrell RN on 3/26/19 at 12:00 PM

## 2019-03-26 NOTE — GROUP NOTE
Group Therapy Note    Date: March 25    Group Start Time: 0830  Group End Time: 0900  Group Topic: Wrap-Up    [facility]    Corina Lindsay    Patient did attend Wrap-Up Group and participated well with others.          Signature:  Corina Lindsay

## 2019-03-26 NOTE — PROGRESS NOTES
BEHAVIORAL HEALTH FOLLOW-UP NOTE     3/26/2019     Patient was seen and examined in person, Chart reviewed   Patient's case discussed with staff/team    Chief Complaint:\" I am ready to go\"    Interim History:     Patient says he is feeling \"OK\". He said his mood is better, more hopeful. He said his appetite was \"OK\". He denied having any more suicidal, or any homicidal ideations. He denied hallucinations, paranoia. He feels overall \"much better\". Appetite:   [x] Normal/Unchanged  [] Increased  [] Decreased      Sleep:       [] Normal/Unchanged  [x] Fair       [] Poor              Energy:    [x] Normal/Unchanged  [] Increased  [] Decreased        SI [] Present  [x] Absent    HI  []Present  [x] Absent     Aggression:  [] yes  [x] no    Patient is [] able  [x] unable to CONTRACT FOR SAFETY     PAST MEDICAL/PSYCHIATRIC HISTORY:   No past medical history on file. FAMILY/SOCIAL HISTORY:  No family history on file.   Social History     Socioeconomic History    Marital status: Single     Spouse name: Not on file    Number of children: Not on file    Years of education: Not on file    Highest education level: Not on file   Occupational History    Not on file   Social Needs    Financial resource strain: Not on file    Food insecurity:     Worry: Not on file     Inability: Not on file    Transportation needs:     Medical: Not on file     Non-medical: Not on file   Tobacco Use    Smoking status: Current Every Day Smoker     Packs/day: 1.00     Types: Cigarettes    Smokeless tobacco: Never Used   Substance and Sexual Activity    Alcohol use: Not Currently    Drug use: Yes     Comment: \"poppyseed tea\"    Sexual activity: Not on file   Lifestyle    Physical activity:     Days per week: Not on file     Minutes per session: Not on file    Stress: Not on file   Relationships    Social connections:     Talks on phone: Not on file     Gets together: Not on file     Attends [employer]ism service: Not on file with the patient. Continue current meds. Risks, benefits, side effects, drug-to-drug interactions and alternatives to treatment were discussed. Collateral information:   CD evaluation  Encourage patient to attend group and other milieu activities.   Discharge planning discussed with the patient and treatment team.  Inpatient Drug Rehab program    PSYCHOTHERAPY/COUNSELING:  [x] Therapeutic interview  [x] Supportive  [] CBT  [] Ongoing  [] Other       [x] Patient continues to need, on a daily basis, active treatment furnished directly by or requiring the supervision of inpatient psychiatric personnel      Anticipated Length of stay:  CD rehab placement          Electronically signed by Kimberlee Avalos MD on 3/26/2019 at 6:40 PM

## 2019-03-26 NOTE — PROGRESS NOTES
Pt presents pleasant and cooperative with a flat affect, reports eating sufficiently and has broken sleep. No evidence of internal stimulation or delusional thoughts and affect is congruent with mood. reports going to the [facility] on discharge. Denies SI,HI,A/Vhallucinations.

## 2019-03-26 NOTE — PROGRESS NOTES
Hospitalist Progress Note      PCP: No primary care provider on file. Date of Admission: 3/16/2019    Interval HPI: patient requesting Imodium for frequent loose stools. Denies cp/sob/n/v/fever/chills/rash. Subjective: :Constitutional: No fever, chills, weakness, otherwise negative  Eyes: No eye pain or redness, otherwise negative  Ears, nose, mouth, throat, and face: No ear ache, no nose bleed no sore throat otherwise negative  Respiratory: No cough, sob, hemoptysis, otherwise  negative  Cardiovascular: No chest pain, palpitation, otherwise negative  Gastrointestinal: No nausea, vomiting diarrhea otherwise negative  Genitourinary:No hematuria, no dysuria, no frequency otherwise negative  Integument/breast: No pain, discomfort, redness otherwise negative  Hematologic/lymphatic: No bleed, lymph node swelling, petechia otherwise negative  Musculoskeletal:No back, muscle or joint pain swelling, otherwise negative  Neurological: No headache, seizure, loss of consciousness otherwise negative  Behavioral/Psych: No depression, suicidal or homicidal ideation otherwise negative  Endocrine: No thyroid pain, warmth or tenderness. No wt loss otherwise negative  Allergic/Immunologic: No sneezing, itching or rash otherwise negative        Medications:  Reviewed    Infusion Medications   Scheduled Medications    lurasidone  40 mg Oral Daily    pill splitter   Does not apply Once    nicotine  1 patch Transdermal Daily     PRN Meds: loperamide, diphenhydrAMINE, ibuprofen, dicyclomine, aluminum & magnesium hydroxide-simethicone, acetaminophen, magnesium hydroxide, hydrOXYzine, traZODone    No intake or output data in the 24 hours ending 03/26/19 1451    Exam:    BP (!) 143/82   Pulse 94   Temp 98 °F (36.7 °C) (Oral)   Resp 18   Ht 5' 8\" (1.727 m)   Wt 180 lb (81.6 kg)   SpO2 95%   BMI 27.37 kg/m²     General appearance: No apparent distress, appears stated age and cooperative.   HEENT: Pupils equal, round, and

## 2019-03-26 NOTE — GROUP NOTE
Group Therapy Note    Date: March 26    Group Start Time: 1100  Group End Time: 1200  Group Topic: Psychotherapy    Patient Goals: Patient will identify benefits of properly utilizing an effective support group    Notes: Patient could identify benefits of properly utilizing an effective support group    Status After Intervention:  Unchanged    Participation Level: Minimal    Participation Quality: Sharing      Speech:  hesitant      Thought Process/Content: Logical      Affective Functioning: Congruent      Mood: depressed      Level of consciousness:  Attentive      Response to Learning: Progressing to goal      Endings: None Reported    Modes of Intervention: Support      Discipline Responsible: /Counselor      Signature:  MARK Chau

## 2019-03-26 NOTE — GROUP NOTE
Group Therapy Note    Date: March 25    Group Start Time: 0700  Group End Time: 0800  Group Topic: Recreational    [facility]    Anival Villela    Patient did attend Activity Group and participated well with others.         Signature:  Anival Villela

## 2019-03-27 PROCEDURE — 6370000000 HC RX 637 (ALT 250 FOR IP): Performed by: PSYCHIATRY & NEUROLOGY

## 2019-03-27 PROCEDURE — 6370000000 HC RX 637 (ALT 250 FOR IP): Performed by: NURSE PRACTITIONER

## 2019-03-27 PROCEDURE — 1240000000 HC EMOTIONAL WELLNESS R&B

## 2019-03-27 RX ADMIN — LOPERAMIDE HYDROCHLORIDE 2 MG: 2 CAPSULE ORAL at 09:00

## 2019-03-27 RX ADMIN — LURASIDONE HYDROCHLORIDE 40 MG: 40 TABLET, FILM COATED ORAL at 08:57

## 2019-03-27 RX ADMIN — TRAZODONE HYDROCHLORIDE 50 MG: 50 TABLET ORAL at 22:08

## 2019-03-27 NOTE — PROGRESS NOTES
Group Therapy Note    Date: 3/27/19  Start Time: 9548  End Time:  7816    Number of Participants:9    Type of Group: Psychoeducation    Patient's Goal:  To participate in mood management group. Notes: Patient declined to attend psychoeducation group at 1435despite encouragement by staff.      Discipline Responsible: /Counselor    Dennis Runner, LISW

## 2019-03-27 NOTE — PROGRESS NOTES
Active member of club or organization: Not on file     Attends meetings of clubs or organizations: Not on file     Relationship status: Not on file    Intimate partner violence:     Fear of current or ex partner: Not on file     Emotionally abused: Not on file     Physically abused: Not on file     Forced sexual activity: Not on file   Other Topics Concern    Not on file   Social History Narrative    Not on file           ROS:  [x] All negative/unchanged except if checked.  Explain positive(checked items) below:  [] Constitutional  [] Eyes  [] Ear/Nose/Mouth/Throat  [] Respiratory  [] CV  [] GI  []   [] Musculoskeletal  [] Skin/Breast  [] Neurological  [] Endocrine  [] Heme/Lymph  [] Allergic/Immunologic    Explanation:     MEDICATIONS:    Current Facility-Administered Medications:     loperamide (IMODIUM) capsule 2 mg, 2 mg, Oral, 4x Daily PRN, JOSSELIN Eubanks - CNP, 2 mg at 03/27/19 0900    diphenhydrAMINE (BENADRYL) capsule 50 mg, 50 mg, Oral, Q6H PRN, Jeannette Leung MD    ibuprofen (ADVIL;MOTRIN) tablet 400 mg, 400 mg, Oral, Q6H PRN, Steve Bates MD, 400 mg at 03/24/19 2156    dicyclomine (BENTYL) capsule 10 mg, 10 mg, Oral, Q6H PRN, Steve Bates MD, 10 mg at 03/24/19 1928    aluminum & magnesium hydroxide-simethicone (MAALOX) 200-200-20 MG/5ML suspension 30 mL, 30 mL, Oral, Q6H PRN, Chanel Minor PA-C, 30 mL at 03/22/19 1759    lurasidone (LATUDA) tablet 40 mg, 40 mg, Oral, Daily, Page Rogers MD, 40 mg at 03/27/19 0857    pill splitter, , Does not apply, Once, Page Rogers MD    acetaminophen (TYLENOL) tablet 650 mg, 650 mg, Oral, Q4H PRN, Steve Bates MD, 650 mg at 03/16/19 2151    magnesium hydroxide (MILK OF MAGNESIA) 400 MG/5ML suspension 30 mL, 30 mL, Oral, Daily PRN, Steve Bates MD    hydrOXYzine (VISTARIL) capsule 50 mg, 50 mg, Oral, TID PRN, Steve Bates MD, 50 mg at 03/25/19 2212    nicotine (NICODERM CQ) 21 MG/24HR 1 patch, 1 patch, Transdermal, with the patient. Continue current meds. Risks, benefits, side effects, drug-to-drug interactions and alternatives to treatment were discussed. Collateral information:   CD evaluation  Encourage patient to attend group and other milieu activities.   Discharge planning discussed with the patient and treatment team.  Inpatient Drug Rehab program  DC by Friday    PSYCHOTHERAPY/COUNSELING:  [x] Therapeutic interview  [x] Supportive  [] CBT  [] Ongoing  [] Other       [x] Patient continues to need, on a daily basis, active treatment furnished directly by or requiring the supervision of inpatient psychiatric personnel      Anticipated Length of stay:  CD rehab placement          Electronically signed by Jeannette Leung MD on 3/27/2019 at 5:28 PM

## 2019-03-27 NOTE — CARE COORDINATION
KATLYN has made numerous calls to the [facility] without successfully completing a referral.   Jasmine Martínez will continue to attempt to make a referral to [facility].

## 2019-03-27 NOTE — CARE COORDINATION
SW spoke with Wallace Calderon who is the shelter coordinator from the [facility] @ [phone] who said a representative from the [facility] will call back to take referral.

## 2019-03-27 NOTE — GROUP NOTE
Group Therapy Note    Date: March 26    Group Start Time: 2100  Group End Time: 2120  Group Topic: Wrap-Up    [facility] [place]ríguez [place]    Patient did not attend group.

## 2019-03-27 NOTE — PROGRESS NOTES
Pt denies SI/HI or AVH. Patient out watching tv not social with peers. Patient seen on the phone, pt asks about this and he states that he was talking with his mother. Patient states dep and anx are better 3/10. Patient states he is working with the [facility] awaiting a bed. Electronically signed by Karla Page LPN on 4/88/5294 at 8:38 PM

## 2019-03-27 NOTE — GROUP NOTE
Group Therapy Note    Date: March 27    Group Start Time: 1110  Group End Time: 1150  Group Topic: Psychotherapy    [facility]      [facility] Note           Patient's Goal: to continue to feel better    Notes:   Working with [facility]    Status After Intervention:  Improved    Participation Level: Minimal    Participation Quality: Attentive      Speech:  normal      Thought Process/Content: Logical      Affective Functioning: Congruent      Mood: anxious      Level of consciousness:  Alert      Response to Learning: Progressing to goal      Endings: None Reported    Modes of Intervention: Support      Discipline Responsible: /Counselor      Signature:  Kimber Gaucher, [facility]

## 2019-03-28 PROCEDURE — 6370000000 HC RX 637 (ALT 250 FOR IP): Performed by: PSYCHIATRY & NEUROLOGY

## 2019-03-28 PROCEDURE — 1240000000 HC EMOTIONAL WELLNESS R&B

## 2019-03-28 RX ADMIN — TRAZODONE HYDROCHLORIDE 50 MG: 50 TABLET ORAL at 22:27

## 2019-03-28 RX ADMIN — LURASIDONE HYDROCHLORIDE 40 MG: 40 TABLET, FILM COATED ORAL at 08:11

## 2019-03-28 NOTE — GROUP NOTE
Group Therapy Note    Date: March 27    Group Start Time: 2100  Group End Time: 2145  Group Topic: Recreational    [facility] I    Jaison Adorno    Patient participated in group activity.            Status After Intervention:  Improved    Participation Level: Interactive    Participation Quality: Appropriate and Attentive      Speech:  normal      Thought Process/Content: Logical      Affective Functioning: Congruent      Mood: euthymic      Level of consciousness:  Alert, Oriented x4 and Attentive      Response to Learning: Able to verbalize current knowledge/experience and Able to verbalize/acknowledge new learning      Endings: None Reported    Modes of Intervention: Activity      Discipline Responsible: Behavorial Health Tech      Signature:  Jaison Adorno

## 2019-03-28 NOTE — GROUP NOTE
Group Therapy Note    Date: March 28    Group Start Time: 1000  Group End Time: 1100  Group Topic: Psychoeducation    [facility]    Modesta Hollins      Group Therapy Note           Patient's Goal:  \"To get better sleep\"    Notes:  Patient work fairly and independently on his task in group. Status After Intervention:  Improved    Participation Level:  Active Listener    Participation Quality: Appropriate      Speech:  normal      Thought Process/Content: Linear      Affective Functioning: Flat      Mood: calm      Level of consciousness:  Alert      Response to Learning: Progressing to goal      Endings: None Reported    Modes of Intervention: Education, Socialization and Activity      Discipline Responsible: Psychoeducational Specialist      Signature:  Feliciano Ronquillo

## 2019-03-28 NOTE — PROGRESS NOTES
Pt. attended the 0900 community meeting. Electronically signed by Jason Carrillo [address] on 3/28/2019 at 10:00 AM

## 2019-03-28 NOTE — BH NOTE
Group Therapy Note     Date: 3/28/2019  Start Time: 1330  End Time:  0315  Number of Participants: 14     Type of Group: Recovery     Wellness Binder Information  Module Name:  Relapse Prevention  Session Number:  Early Warning signs     Patient's Goal:  Recognize early warning signs of relapse     Notes:  Recognizes that being around people who are drinking or drugging is a problem for him and sets up relapse.     Status After Intervention:  Improved     Participation Level: Interactive     Participation Quality: Attentive and Sharing        Speech:  normal        Thought Process/Content: Logical        Affective Functioning: Congruent        Mood: anxious and depressed        Level of consciousness:  Alert, attentive        Response to Learning: Able to verbalize current knowledge/experience and Progressing to goal        Endings: None Reported     Modes of Intervention: Education, Support and Socialization        Discipline Responsible: Registered Nurse        Signature:  JOSSELIN Alvarado

## 2019-03-28 NOTE — FLOWSHEET NOTE
Denies si, hi and avh. Overall feeling better, concerned about where he will go on discharge. Mood is improving. Hopeful for sobriety on d/c.     Electronically signed by Louie Bedoya RN on 3/28/2019 at 10:57 AM

## 2019-03-28 NOTE — GROUP NOTE
Group Therapy Note    Date: March 28    Group Start Time: 1100  Group End Time: 1200  Group Topic: Psychotherapy    [facility]    Amalia Sheppard      Group Therapy Note           Patient's Goal:  To share thoughts and feelings about being discharged tomorrow    Notes:  Patient was active listener and responded when asked to share feelings with groups    Status After Intervention:  improved    Participation Level:  Active Listener    Participation Quality: Appropriate      Speech:  normal      Thought Process/Content: Logical      Affective Functioning: Congruent      Mood: euthymic      Level of consciousness:  Alert and Oriented x4      Response to Learning: Able to verbalize current knowledge/experience      Endings: None Reported    Modes of Intervention: Support      Discipline Responsible: /Counselor      Signature:  Amalia Sheppard

## 2019-03-28 NOTE — PROGRESS NOTES
BEHAVIORAL HEALTH FOLLOW-UP NOTE     3/28/2019     Patient was seen and examined in person, Chart reviewed   Patient's case discussed with staff/team    Chief Complaint:\" I am ready to go\"    Interim History:     Patient says he is feeling \"OK\". He said his mood is better, more hopeful. He said his appetite was \"OK\". He denied having any more suicidal, or any homicidal ideations. He denied hallucinations, paranoia. He feels overall \"much better\". Appetite:   [x] Normal/Unchanged  [] Increased  [] Decreased      Sleep:       [] Normal/Unchanged  [x] Fair       [] Poor              Energy:    [x] Normal/Unchanged  [] Increased  [] Decreased        SI [] Present  [x] Absent    HI  []Present  [x] Absent     Aggression:  [] yes  [x] no    Patient is [] able  [x] unable to CONTRACT FOR SAFETY     PAST MEDICAL/PSYCHIATRIC HISTORY:   No past medical history on file. FAMILY/SOCIAL HISTORY:  No family history on file.   Social History     Socioeconomic History    Marital status: Single     Spouse name: Not on file    Number of children: Not on file    Years of education: Not on file    Highest education level: Not on file   Occupational History    Not on file   Social Needs    Financial resource strain: Not on file    Food insecurity:     Worry: Not on file     Inability: Not on file    Transportation needs:     Medical: Not on file     Non-medical: Not on file   Tobacco Use    Smoking status: Current Every Day Smoker     Packs/day: 1.00     Types: Cigarettes    Smokeless tobacco: Never Used   Substance and Sexual Activity    Alcohol use: Not Currently    Drug use: Yes     Comment: \"poppyseed tea\"    Sexual activity: Not on file   Lifestyle    Physical activity:     Days per week: Not on file     Minutes per session: Not on file    Stress: Not on file   Relationships    Social connections:     Talks on phone: Not on file     Gets together: Not on file     Attends Worship service: Not on file Daily, Randy Lucero MD, 1 patch at 03/25/19 0212    traZODone (DESYREL) tablet 50 mg, 50 mg, Oral, Nightly PRN, Randy Lucero MD, 50 mg at 03/27/19 2680      Examination:  BP (!) 158/97 Comment: Nurse aware  Pulse 87   Temp 98 °F (36.7 °C) (Oral)   Resp 18   Ht 5' 8\" (1.727 m)   Wt 180 lb (81.6 kg)   SpO2 96%   BMI 27.37 kg/m²   Gait - steady  Medication side effects(SE): no    Mental Status Examination:    Level of consciousness: within normal limits   Appearance:  fair grooming and fair hygiene  Behavior/Motor:  Less psychomotor retardation  Attitude toward examiner:  withdrawn  Speech: slow   Mood: less depressed  Affect:  mood congruent  Thought processes:  slow   Thought content:  Suicidal Ideation:  denies suicidal ideation  Delusions:  no evidence of delusions  Perceptual Disturbance:  denies any perceptual disturbance  Cognition:  oriented to person, place, and time   Concentration intact  Insight fair   Judgement fair     ASSESSMENT:   Patient symptoms are:  [] Well controlled  [x] Improving  [] Worsening  [] No change      Diagnosis:   Principal Problem:    Bipolar affective disorder, depressed, severe, with psychotic behavior ([facility] [address]  Active Problems:    Opioid dependence with withdrawal ([facility] [address]    Polydrug abuse ([facility] [address] [facility]  Resolved Problems:    * No resolved hospital problems. *      LABS:    No results for input(s): WBC, HGB, PLT in the last 72 hours. No results for input(s): NA, K, CL, CO2, BUN, CREATININE, GLUCOSE in the last 72 hours. No results for input(s): BILITOT, ALKPHOS, AST, ALT in the last 72 hours.   Lab Results   Component Value Date    [address] POSITIVE 03/16/2019    BARBSCNU Neg 03/16/2019    LABBENZ Neg 03/16/2019    OPIATESCREENURINE POSITIVE 03/16/2019    PHENCYCLIDINESCREENURINE Neg 03/16/2019    ETOH <10 03/16/2019     Lab Results   Component Value Date    TSH 1.110 03/16/2019     No results found for: LITHIUM  No results found for: VALPROATE, CBMZ      Treatment Plan:  Reviewed current Medications with the patient. Continue current meds. Risks, benefits, side effects, drug-to-drug interactions and alternatives to treatment were discussed. Collateral information:   CD evaluation  Encourage patient to attend group and other milieu activities.   Discharge planning discussed with the patient and treatment team.  Inpatient Drug Rehab program  DC to Rehab tomorrow    PSYCHOTHERAPY/COUNSELING:  [x] Therapeutic interview  [x] Supportive  [] CBT  [] Ongoing  [] Other       [x] Patient continues to need, on a daily basis, active treatment furnished directly by or requiring the supervision of inpatient psychiatric personnel      Anticipated Length of stay:  CD rehab placement          Electronically signed by Latrice Dnenis MD on 3/28/2019 at 12:00 PM

## 2019-03-28 NOTE — PROGRESS NOTES
Pt visible on unit and social with select. Denies SI/HI/AVH. Rates depression \"2-3\" and reports some mild anxiety. He is med compliant, attends groups, and showers daily. He reports fair sleep and good appetite. Cooperative with assessment. Clean appearance and good eye contact.

## 2019-03-29 VITALS
OXYGEN SATURATION: 96 % | HEART RATE: 80 BPM | TEMPERATURE: 98 F | HEIGHT: 68 IN | SYSTOLIC BLOOD PRESSURE: 137 MMHG | RESPIRATION RATE: 18 BRPM | DIASTOLIC BLOOD PRESSURE: 89 MMHG | WEIGHT: 180 LBS | BODY MASS INDEX: 27.28 KG/M2

## 2019-03-29 PROCEDURE — 6370000000 HC RX 637 (ALT 250 FOR IP): Performed by: PSYCHIATRY & NEUROLOGY

## 2019-03-29 RX ADMIN — LURASIDONE HYDROCHLORIDE 40 MG: 40 TABLET, FILM COATED ORAL at 08:49

## 2019-03-29 NOTE — PROGRESS NOTES
Pt. attended the 0900 community meeting.  Electronically signed by Sheridan Abdi on 3/29/2019 at 9:31 AM

## 2019-03-29 NOTE — PROGRESS NOTES
CLINICAL PHARMACY NOTE: MEDS TO [address] Select Patient?: No  Total # of Prescriptions Filled: 1   The following medications were delivered to the patient:  · Jero  Total # of Interventions Completed: 1  Time Spent (min): 5    Additional Documentation:

## 2019-03-29 NOTE — GROUP NOTE
Group Therapy Note    Date: March 28    Group Start Time: 1930  Group End Time: 2030  Group Topic: Recreational    [facility]    Mitchell Vidal    Patient participated in group activity.          Status After Intervention:  Improved    Participation Level: Interactive    Participation Quality: Appropriate and Attentive      Speech:  normal      Thought Process/Content: Logical      Affective Functioning: Congruent      Mood: euthymic      Level of consciousness:  Alert and Oriented x4      Response to Learning: Progressing to goal      Endings: None Reported    Modes of Intervention: Activity      Discipline Responsible: [employer]      Signature:  Mitchell Vidal

## 2019-03-29 NOTE — PROGRESS NOTES
Pt. refused to attend the 1000 skills group, despite staff encouragement.  Electronically signed by Cal Retana on 3/29/2019 at 1:03 PM

## 2019-03-30 NOTE — DISCHARGE SUMMARY
[address]                      [address], [address] [facility]                               DISCHARGE SUMMARY    PATIENT NAME: Sabina Ulloa                     :        1985  MED REC NO:   [id number]                            ROOM:       I130  ACCOUNT NO:   [de-identified]                           ADMIT DATE: 2019  PROVIDER:     She Avalos MD                [facility] DATE: 2019    CHIEF COMPLAINT, HISTORY OF PRESENT ILLNESS, PAST PSYCHIATRIC HISTORY,  PAST MEDICAL HISTORY, LEGAL HISTORY, SUBSTANCE ABUSE HISTORY, PERSONAL,  FAMILY, SOCIAL HISTORY, ADMISSION ASSESSMENT AND PLAN:  Please refer to  the psychiatric evaluation done by Dr. Alisha Santos on 2019. HOSPITAL COURSE AND TREATMENT:  The patient came here extremely agitated  and initially was uncooperative. He was on heroin withdrawal and  agitation and anxiety was coming from there. He was detoxed and it took  a while for him to get out of the detox. Dr. Sofie Robert saw the patient  and changed the diagnosis of bipolar disorder, depressed severe with  psychotic behavior, opiate dependence, and polydrug abuse. The patient  was also started on Latuda 20 mg daily and started 40 mg daily. After a  sudden period of time, the patient said that he wants to go to the rehab  program.  We started to look in for inpatient rehab. His Latuda  increased up to 40 mg, his mood elevated. He started coming out of the  room, not been isolated anymore last 48 to 72 hours. He was more  animated. He was calling others, making friends, and watching TV. The  treatment team felt that he was getting better. We got inpatient rehab  program at [address] [place] [address] and plan was to send him here today at noon  so that he can go there by 2 o'clock for the intake. However, this  morning he changed his mind.   He said that he talked to his mother and  he will go to his mother and he will do the drug rehab outpatient at  [facility]. He caught us by surprise, but however, he was not suicidal,  homicidal, psychotic, and did not have any acute symptoms and he did not  meet the criteria for inpatient level of psychiatric care and was  discharged this afternoon to his mother and mother is going to come and  pick him up. The patient was again educated that if he chooses to  continue to use drugs, alcohol, he may potentially act out impulsively,  resulting in serious harm to self or others. He demonstrated  understanding and has the capacity to understand that. He was also  educated that mental health treatment cannot be optimized with ongoing  use of drug. He demonstrated understanding and has the capacity to  understand that too. MENTAL STATUS EXAM AT THE TIME OF DISCHARGE:  The patient was calm,  cooperative, forthcoming, and revealing information. Psychomotor  revealed no agitation, retardation, or any other abnormal movement. He  presents with a very good hygiene, grooming. Eye contact was good. Speech was normal in rate, rhythm, and tone. Mood \"I am feeling much  better doctor. \"  Affect was mood congruent, appropriate, pleasant, and  appreciative. Thought process was logical without flights of ideas or  looseness of association. Thought contents were devoid of auditory or  visual hallucinations, delusion, or any other perceptual abnormalities. He denies suicidal ideation, intent, or any plan. Also, denies  homicidal ideation. Insight and judgment good. Impulse control is  adequate. Cognitive function at baseline. He is alert and oriented to  time, place, and person. DISCHARGE DIAGNOSES:  1. Bipolar disorder, currently unstable. 2.  Opiate dependence with _____ dependence. 3.  Polydrug and polysubstance abuse. DISCHARGE MEDICATIONS:  Latuda 40 mg daily. FOLLOWUP PLAN:  We will go to [facility] for IOP and drug rehab  counseling and also medication management.   Appointment on 04/02/2019 at  [address] at 2:30 p.m. with Theousmanebert Fine for  intake. The patient was again educated if he chooses to continue to use the  drugs, he may potentially act out impulsively, resulting in serious harm  to self or others. Even though unintentional or may have unintentional  overdose. He demonstrated understanding and has the capacity to  understand that. CONDITION AT THE TIME OF DISCHARGE:  Stable.         Prudence Stinson MD    D: 03/29/2019 10:11:31       T: 03/30/2019 5:53:08     MARIA [email or web address]  Job#: [id number]     Doc#: [id number]    CC:

## 2019-07-16 ENCOUNTER — APPOINTMENT (OUTPATIENT)
Dept: GENERAL RADIOLOGY | Age: 34
End: 2019-07-16
Payer: MEDICAID

## 2019-07-16 ENCOUNTER — HOSPITAL ENCOUNTER (EMERGENCY)
Age: 34
Discharge: ANOTHER HOSPITAL | End: 2019-07-17
Payer: MEDICAID

## 2019-07-16 VITALS
SYSTOLIC BLOOD PRESSURE: 138 MMHG | BODY MASS INDEX: 25.76 KG/M2 | HEIGHT: 68 IN | WEIGHT: 170 LBS | OXYGEN SATURATION: 96 % | HEART RATE: 90 BPM | DIASTOLIC BLOOD PRESSURE: 83 MMHG | TEMPERATURE: 97.9 F | RESPIRATION RATE: 16 BRPM

## 2019-07-16 DIAGNOSIS — F19.94 SUBSTANCE INDUCED MOOD DISORDER (HCC): Primary | ICD-10-CM

## 2019-07-16 LAB
ACETAMINOPHEN LEVEL: <5 UG/ML (ref 10–30)
ALBUMIN SERPL-MCNC: 4.9 G/DL (ref 3.5–4.6)
ALP BLD-CCNC: 96 U/L (ref 35–104)
ALT SERPL-CCNC: 15 U/L (ref 0–41)
AMPHETAMINE SCREEN, URINE: POSITIVE
ANION GAP SERPL CALCULATED.3IONS-SCNC: 13 MEQ/L (ref 9–15)
ANISOCYTOSIS: ABNORMAL
AST SERPL-CCNC: 34 U/L (ref 0–40)
BACTERIA: NEGATIVE /HPF
BARBITURATE SCREEN URINE: ABNORMAL
BASOPHILS ABSOLUTE: 0 K/UL (ref 0–0.2)
BASOPHILS RELATIVE PERCENT: 0.1 %
BENZODIAZEPINE SCREEN, URINE: ABNORMAL
BILIRUB SERPL-MCNC: 0.4 MG/DL (ref 0.2–0.7)
BILIRUBIN URINE: NEGATIVE
BLOOD, URINE: NEGATIVE
BUN BLDV-MCNC: 8 MG/DL (ref 6–20)
CALCIUM SERPL-MCNC: 9.4 MG/DL (ref 8.5–9.9)
CANNABINOID SCREEN URINE: ABNORMAL
CHLORIDE BLD-SCNC: 101 MEQ/L (ref 95–107)
CK MB: 4.4 NG/ML (ref 0–6.7)
CLARITY: ABNORMAL
CO2: 24 MEQ/L (ref 20–31)
COCAINE METABOLITE SCREEN URINE: ABNORMAL
COLOR: YELLOW
CREAT SERPL-MCNC: 0.82 MG/DL (ref 0.7–1.2)
CREATINE KINASE-MB INDEX: 1.2 % (ref 0–3.5)
EKG ATRIAL RATE: 76 BPM
EKG P AXIS: 28 DEGREES
EKG P-R INTERVAL: 112 MS
EKG Q-T INTERVAL: 360 MS
EKG QRS DURATION: 100 MS
EKG QTC CALCULATION (BAZETT): 405 MS
EKG R AXIS: 13 DEGREES
EKG T AXIS: 23 DEGREES
EKG VENTRICULAR RATE: 76 BPM
EOSINOPHILS ABSOLUTE: 0.1 K/UL (ref 0–0.7)
EOSINOPHILS RELATIVE PERCENT: 0.5 %
EPITHELIAL CELLS, UA: ABNORMAL /HPF (ref 0–5)
ETHANOL PERCENT: NORMAL G/DL
ETHANOL: <10 MG/DL (ref 0–0.08)
GFR AFRICAN AMERICAN: >60
GFR NON-AFRICAN AMERICAN: >60
GLOBULIN: 3.2 G/DL (ref 2.3–3.5)
GLUCOSE BLD-MCNC: 101 MG/DL (ref 70–99)
GLUCOSE URINE: NEGATIVE MG/DL
HCT VFR BLD CALC: 45.5 % (ref 42–52)
HEMOGLOBIN: 15.7 G/DL (ref 14–18)
KETONES, URINE: NEGATIVE MG/DL
LEUKOCYTE ESTERASE, URINE: NEGATIVE
LYMPHOCYTES ABSOLUTE: 0.9 K/UL (ref 1–4.8)
LYMPHOCYTES RELATIVE PERCENT: 3.6 %
Lab: ABNORMAL
MCH RBC QN AUTO: 32.7 PG (ref 27–31.3)
MCHC RBC AUTO-ENTMCNC: 34.5 % (ref 33–37)
MCV RBC AUTO: 94.7 FL (ref 80–100)
MONOCYTES ABSOLUTE: 1.5 K/UL (ref 0.2–0.8)
MONOCYTES RELATIVE PERCENT: 6.3 %
NEUTROPHILS ABSOLUTE: 21.7 K/UL (ref 1.4–6.5)
NEUTROPHILS RELATIVE PERCENT: 89.5 %
NITRITE, URINE: NEGATIVE
OPIATE SCREEN URINE: ABNORMAL
PDW BLD-RTO: 12.5 % (ref 11.5–14.5)
PH UA: 6.5 (ref 5–9)
PHENCYCLIDINE SCREEN URINE: ABNORMAL
PLATELET # BLD: 307 K/UL (ref 130–400)
PLATELET SLIDE REVIEW: ADEQUATE
POTASSIUM REFLEX MAGNESIUM: 4 MEQ/L (ref 3.4–4.9)
PROTEIN UA: 30 MG/DL
RBC # BLD: 4.8 M/UL (ref 4.7–6.1)
RBC UA: ABNORMAL /HPF (ref 0–5)
SALICYLATE, SERUM: <0.3 MG/DL (ref 15–30)
SLIDE REVIEW: ABNORMAL
SODIUM BLD-SCNC: 138 MEQ/L (ref 135–144)
SPECIFIC GRAVITY UA: 1.01 (ref 1–1.03)
TOTAL CK: 372 U/L (ref 0–190)
TOTAL PROTEIN: 8.1 G/DL (ref 6.3–8)
TSH SERPL DL<=0.05 MIU/L-ACNC: 0.94 UIU/ML (ref 0.44–3.86)
URINE REFLEX TO CULTURE: YES
UROBILINOGEN, URINE: 0.2 E.U./DL
WBC # BLD: 24.2 K/UL (ref 4.8–10.8)
WBC UA: ABNORMAL /HPF (ref 0–5)

## 2019-07-16 PROCEDURE — 82550 ASSAY OF CK (CPK): CPT

## 2019-07-16 PROCEDURE — 71045 X-RAY EXAM CHEST 1 VIEW: CPT

## 2019-07-16 PROCEDURE — G0480 DRUG TEST DEF 1-7 CLASSES: HCPCS

## 2019-07-16 PROCEDURE — 90715 TDAP VACCINE 7 YRS/> IM: CPT | Performed by: NURSE PRACTITIONER

## 2019-07-16 PROCEDURE — 80307 DRUG TEST PRSMV CHEM ANLYZR: CPT

## 2019-07-16 PROCEDURE — 82553 CREATINE MB FRACTION: CPT

## 2019-07-16 PROCEDURE — 80053 COMPREHEN METABOLIC PANEL: CPT

## 2019-07-16 PROCEDURE — 36415 COLL VENOUS BLD VENIPUNCTURE: CPT

## 2019-07-16 PROCEDURE — 85025 COMPLETE CBC W/AUTO DIFF WBC: CPT

## 2019-07-16 PROCEDURE — 90471 IMMUNIZATION ADMIN: CPT | Performed by: NURSE PRACTITIONER

## 2019-07-16 PROCEDURE — 93005 ELECTROCARDIOGRAM TRACING: CPT | Performed by: PERSONAL EMERGENCY RESPONSE ATTENDANT

## 2019-07-16 PROCEDURE — 87086 URINE CULTURE/COLONY COUNT: CPT

## 2019-07-16 PROCEDURE — 81001 URINALYSIS AUTO W/SCOPE: CPT

## 2019-07-16 PROCEDURE — 84443 ASSAY THYROID STIM HORMONE: CPT

## 2019-07-16 PROCEDURE — 99284 EMERGENCY DEPT VISIT MOD MDM: CPT

## 2019-07-16 PROCEDURE — 6360000002 HC RX W HCPCS: Performed by: NURSE PRACTITIONER

## 2019-07-16 RX ORDER — DIAPER,BRIEF,INFANT-TODD,DISP
1 EACH MISCELLANEOUS ONCE
Status: DISCONTINUED | OUTPATIENT
Start: 2019-07-16 | End: 2019-07-17 | Stop reason: HOSPADM

## 2019-07-16 RX ADMIN — TETANUS TOXOID, REDUCED DIPHTHERIA TOXOID AND ACELLULAR PERTUSSIS VACCINE, ADSORBED 0.5 ML: 5; 2.5; 8; 8; 2.5 SUSPENSION INTRAMUSCULAR at 11:51

## 2019-07-16 ASSESSMENT — ENCOUNTER SYMPTOMS
RHINORRHEA: 0
SHORTNESS OF BREATH: 0
VOMITING: 0
DIARRHEA: 0
PHOTOPHOBIA: 0
ABDOMINAL PAIN: 0
NAUSEA: 0
EYE PAIN: 0
SORE THROAT: 0
BACK PAIN: 0
COUGH: 0

## 2019-07-16 ASSESSMENT — PAIN DESCRIPTION - FREQUENCY: FREQUENCY: INTERMITTENT

## 2019-07-16 ASSESSMENT — PAIN SCALES - GENERAL: PAINLEVEL_OUTOF10: 6

## 2019-07-16 ASSESSMENT — PAIN DESCRIPTION - ORIENTATION: ORIENTATION: LEFT

## 2019-07-16 ASSESSMENT — PAIN DESCRIPTION - DESCRIPTORS: DESCRIPTORS: NUMBNESS;ACHING

## 2019-07-16 ASSESSMENT — PAIN DESCRIPTION - ONSET: ONSET: ON-GOING

## 2019-07-16 ASSESSMENT — PATIENT HEALTH QUESTIONNAIRE - PHQ9: SUM OF ALL RESPONSES TO PHQ QUESTIONS 1-9: 13

## 2019-07-16 ASSESSMENT — PAIN DESCRIPTION - PAIN TYPE: TYPE: ACUTE PAIN
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
## 2019-07-17 LAB — URINE CULTURE, ROUTINE: NORMAL

## 2019-07-18 PROCEDURE — 93010 ELECTROCARDIOGRAM REPORT: CPT | Performed by: INTERNAL MEDICINE

## 2019-10-23 ENCOUNTER — HOSPITAL ENCOUNTER (OUTPATIENT)
Dept: NON INVASIVE DIAGNOSTICS | Age: 34
Discharge: HOME | End: 2019-10-23
Payer: MEDICAID

## 2019-10-23 LAB
EKG ATRIAL RATE: 54 BPM
EKG P AXIS: 54 DEGREES
EKG P-R INTERVAL: 120 MS
EKG Q-T INTERVAL: 400 MS
EKG QRS DURATION: 104 MS
EKG QTC CALCULATION (BAZETT): 379 MS
EKG R AXIS: 37 DEGREES
EKG T AXIS: 41 DEGREES
EKG VENTRICULAR RATE: 54 BPM

## 2019-10-23 PROCEDURE — 93005 ELECTROCARDIOGRAM TRACING: CPT | Performed by: NURSE PRACTITIONER

## 2019-10-24 PROCEDURE — 93010 ELECTROCARDIOGRAM REPORT: CPT | Performed by: INTERNAL MEDICINE

## 2021-05-28 ENCOUNTER — RECORDS - HEALTHEAST (OUTPATIENT)
Dept: ADMINISTRATIVE | Facility: CLINIC | Age: 36
End: 2021-05-28

## 2021-12-17 NOTE — CARE COORDINATION
FAMILY COLLATERAL NOTE    Family/Support Name: Edi Munoz  Contact #: [phone]  Relationship to Pt[de-identified] mother    SW spoke with Patient's mother who said, \"We've been through this before\", \"his return here is to the best option\". Cassandra Mesa is asking for a family meeting tomorrow at 6 am before Patient's discharge.              MARK Justin No, patient unwilling to participate

## 2022-04-17 ENCOUNTER — HOSPITAL ENCOUNTER (EMERGENCY)
Age: 37
Discharge: HOME | End: 2022-04-17
Attending: EMERGENCY MEDICINE
Payer: MEDICAID

## 2022-04-17 ENCOUNTER — APPOINTMENT (OUTPATIENT)
Dept: GENERAL RADIOLOGY | Age: 37
End: 2022-04-17
Payer: MEDICAID

## 2022-04-17 VITALS
BODY MASS INDEX: 28.04 KG/M2 | HEART RATE: 79 BPM | WEIGHT: 185 LBS | RESPIRATION RATE: 18 BRPM | OXYGEN SATURATION: 95 % | DIASTOLIC BLOOD PRESSURE: 98 MMHG | TEMPERATURE: 98.8 F | SYSTOLIC BLOOD PRESSURE: 139 MMHG | HEIGHT: 68 IN

## 2022-04-17 DIAGNOSIS — F41.1 ANXIETY STATE: Primary | ICD-10-CM

## 2022-04-17 DIAGNOSIS — R00.2 PALPITATIONS: ICD-10-CM

## 2022-04-17 LAB
ANION GAP SERPL CALCULATED.3IONS-SCNC: 12 MEQ/L (ref 9–15)
BASOPHILS ABSOLUTE: 0 K/UL (ref 0–0.2)
BASOPHILS RELATIVE PERCENT: 0.6 %
BUN BLDV-MCNC: 6 MG/DL (ref 6–20)
CALCIUM SERPL-MCNC: 9.6 MG/DL (ref 8.5–9.9)
CHLORIDE BLD-SCNC: 103 MEQ/L (ref 95–107)
CO2: 25 MEQ/L (ref 20–31)
CREAT SERPL-MCNC: 0.63 MG/DL (ref 0.7–1.2)
EOSINOPHILS ABSOLUTE: 0.2 K/UL (ref 0–0.7)
EOSINOPHILS RELATIVE PERCENT: 2.9 %
GFR AFRICAN AMERICAN: >60
GFR NON-AFRICAN AMERICAN: >60
GLUCOSE BLD-MCNC: 106 MG/DL (ref 70–99)
HCT VFR BLD CALC: 42 % (ref 42–52)
HEMOGLOBIN: 14.2 G/DL (ref 14–18)
LYMPHOCYTES ABSOLUTE: 1.2 K/UL (ref 1–4.8)
LYMPHOCYTES RELATIVE PERCENT: 18.1 %
MCH RBC QN AUTO: 30.3 PG (ref 27–31.3)
MCHC RBC AUTO-ENTMCNC: 33.8 % (ref 33–37)
MCV RBC AUTO: 89.5 FL (ref 80–100)
MONOCYTES ABSOLUTE: 0.6 K/UL (ref 0.2–0.8)
MONOCYTES RELATIVE PERCENT: 8.2 %
NEUTROPHILS ABSOLUTE: 4.9 K/UL (ref 1.4–6.5)
NEUTROPHILS RELATIVE PERCENT: 70.2 %
PDW BLD-RTO: 12.2 % (ref 11.5–14.5)
PLATELET # BLD: 251 K/UL (ref 130–400)
POTASSIUM REFLEX MAGNESIUM: 3.9 MEQ/L (ref 3.4–4.9)
RBC # BLD: 4.7 M/UL (ref 4.7–6.1)
SODIUM BLD-SCNC: 140 MEQ/L (ref 135–144)
TROPONIN: <0.01 NG/ML (ref 0–0.01)
WBC # BLD: 6.9 K/UL (ref 4.8–10.8)

## 2022-04-17 PROCEDURE — 99284 EMERGENCY DEPT VISIT MOD MDM: CPT

## 2022-04-17 PROCEDURE — 80048 BASIC METABOLIC PNL TOTAL CA: CPT

## 2022-04-17 PROCEDURE — 84484 ASSAY OF TROPONIN QUANT: CPT

## 2022-04-17 PROCEDURE — 6360000002 HC RX W HCPCS: Performed by: EMERGENCY MEDICINE

## 2022-04-17 PROCEDURE — 36415 COLL VENOUS BLD VENIPUNCTURE: CPT

## 2022-04-17 PROCEDURE — 93005 ELECTROCARDIOGRAM TRACING: CPT | Performed by: EMERGENCY MEDICINE

## 2022-04-17 PROCEDURE — 96374 THER/PROPH/DIAG INJ IV PUSH: CPT

## 2022-04-17 PROCEDURE — 71045 X-RAY EXAM CHEST 1 VIEW: CPT

## 2022-04-17 PROCEDURE — 85025 COMPLETE CBC W/AUTO DIFF WBC: CPT

## 2022-04-17 RX ORDER — LORAZEPAM 2 MG/ML
1 INJECTION INTRAMUSCULAR ONCE
Status: COMPLETED | OUTPATIENT
Start: 2022-04-17 | End: 2022-04-17

## 2022-04-17 RX ADMIN — LORAZEPAM 1 MG: 2 INJECTION INTRAMUSCULAR; INTRAVENOUS at 20:42

## 2022-04-17 ASSESSMENT — PAIN SCALES - WONG BAKER: WONGBAKER_NUMERICALRESPONSE: 0

## 2022-04-17 ASSESSMENT — ENCOUNTER SYMPTOMS
SHORTNESS OF BREATH: 0
CHEST TIGHTNESS: 0

## 2022-04-17 ASSESSMENT — PAIN - FUNCTIONAL ASSESSMENT: PAIN_FUNCTIONAL_ASSESSMENT: 0-10

## 2022-04-17 ASSESSMENT — PAIN DESCRIPTION - PAIN TYPE: TYPE: ACUTE PAIN

## 2022-04-17 ASSESSMENT — PAIN DESCRIPTION - LOCATION: LOCATION: CHEST

## 2022-04-17 ASSESSMENT — PAIN SCALES - GENERAL: PAINLEVEL_OUTOF10: 3

## 2022-04-18 LAB
EKG ATRIAL RATE: 76 BPM
EKG P AXIS: 40 DEGREES
EKG P-R INTERVAL: 140 MS
EKG Q-T INTERVAL: 352 MS
EKG QRS DURATION: 96 MS
EKG QTC CALCULATION (BAZETT): 396 MS
EKG R AXIS: -10 DEGREES
EKG T AXIS: 8 DEGREES
EKG VENTRICULAR RATE: 76 BPM

## 2022-04-18 PROCEDURE — 93010 ELECTROCARDIOGRAM REPORT: CPT | Performed by: INTERNAL MEDICINE

## 2022-04-18 NOTE — ED PROVIDER NOTES
[facility] ED  EMERGENCY DEPARTMENT ENCOUNTER      Pt Name: Magdaleno Oh  MRN: [id number]  [place] 1985  Date of evaluation: 4/17/2022  Provider: Erma Senior, [address] [place] [address]       Chief Complaint   Patient presents with    Chest Pain         HISTORY OF PRESENT ILLNESS   (Location/Symptom, Timing/Onset, Context/Setting, Quality, Duration, Modifying Factors, Severity)  Note limiting factors. Magdaleno Oh is a 39 y.o. male who presents to the emergency department via EMS for evaluation of \" sensation of feeling my heart\". He reports history of anxiety, was prescribed Klonopin in the past.  Does report he remotely used drugs but currently is clean. No SI/HI/hallucinations. Says he is going through a lot of stress as he is being evicted from his home and his mother is in hospice. Says in the past when he gets stressed he feels the similar type of panic attack. Does not endorse fever, neck or jaw pain, chest pain or difficulty breathing, vomiting, abdominal pain, back pain, calf pain or swelling. No recent surgeries, history of DVT or PE, hemoptysis or hormone use. Did not take anything for relief today. Does report throughout the day he has felt stressed and \" feels my heartbeat\". HPI    Nursing Notes were reviewed. REVIEW OF SYSTEMS    (2-9 systems for level 4, 10 or more for level 5)     Review of Systems   Respiratory: Negative for chest tightness and shortness of breath. Cardiovascular:        Feels heartbeat   Musculoskeletal: Negative for neck pain. All other systems reviewed and are negative. Except as noted above the remainder of the review of systems was reviewed and negative. PAST MEDICAL HISTORY   No past medical history on file. SURGICAL HISTORY     No past surgical history on file.       CURRENT MEDICATIONS       Previous Medications    AMPHETAMINE-DEXTROAMPHETAMINE (ADDERALL XR) 10 MG EXTENDED RELEASE CAPSULE    Take 20 mg by mouth every morning. BUPRENORPHINE HCL-NALOXONE HCL (SUBOXONE SL)    Place 6 mg under the tongue    LURASIDONE (LATUDA) 40 MG TABS TABLET    Take 1 tablet by mouth daily       ALLERGIES     Patient has no known allergies. FAMILY HISTORY     No family history on file. SOCIAL HISTORY       Social History     Socioeconomic History    Marital status: Single     Spouse name: Not on file    Number of children: Not on file    Years of education: Not on file    Highest education level: Not on file   Occupational History    Not on file   Tobacco Use    Smoking status: Current Every Day Smoker     Packs/day: 1.00     Types: Cigarettes    Smokeless tobacco: Never Used   Vaping Use    Vaping Use: Never used   Substance and Sexual Activity    Alcohol use: Yes     Comment: socially    Drug use: Yes     Types: Opiates , Methamphetamines (Crystal Meth), Marijuana (Weed)     Comment: \"poppyseed tea\"    Sexual activity: Not on file   Other Topics Concern    Not on file   Social History Narrative    Not on file     Social Determinants of Health     Financial Resource Strain:     Difficulty of Paying Living Expenses: Not on file   Food Insecurity:     Worried About Running Out of Food in the Last Year: Not on file    Balwinder of Food in the Last Year: Not on file   Transportation Needs:     Lack of Transportation (Medical): Not on file    Lack of Transportation (Non-Medical):  Not on file   Physical Activity:     Days of Exercise per Week: Not on file    Minutes of Exercise per Session: Not on file   Stress:     Feeling of Stress : Not on file   Social Connections:     Frequency of Communication with Friends and Family: Not on file    Frequency of Social Gatherings with Friends and Family: Not on file    Attends Mu-ism Services: Not on file    Active Member of Clubs or Organizations: Not on file    Attends Club or Organization Meetings: Not on file    Marital Status: Not on file   Intimate Partner Violence:  Fear of Current or Ex-Partner: Not on file    Emotionally Abused: Not on file    Physically Abused: Not on file    Sexually Abused: Not on file   Housing Stability:     Unable to Pay for Housing in the Last Year: Not on file    Number of [place] in the Last Year: Not on file    Unstable Housing in the Last Year: Not on file       SCREENINGS         Ginette Coma Scale  Eye Opening: Spontaneous  Best Verbal Response: Oriented  Best Motor Response: Obeys commands  Ginette Coma Scale Score: 15                     CIWA Assessment  BP: (!) 139/98  Pulse: 79                 PHYSICAL EXAM    (up to 7 for level 4, 8 or more for level 5)     ED Triage Vitals   BP Temp Temp src Pulse Resp SpO2 Height Weight   -- -- -- -- -- -- -- --       Physical Exam  Constitutional:       General: He is not in acute distress. Appearance: He is not toxic-appearing or diaphoretic. HENT:      Nose: Nose normal.      Mouth/Throat:      Mouth: Mucous membranes are moist.   Eyes:      General: No scleral icterus. Conjunctiva/sclera: Conjunctivae normal.   Neck:      Comments: No JVD  Cardiovascular:      Rate and Rhythm: Normal rate and regular rhythm. Pulses: Normal pulses. Heart sounds: No gallop. Pulmonary:      Effort: Pulmonary effort is normal.      Breath sounds: Normal breath sounds. Abdominal:      Tenderness: There is no abdominal tenderness. There is no guarding. Musculoskeletal:      Cervical back: Normal range of motion. Right lower leg: No edema. Left lower leg: No edema. Skin:     Capillary Refill: Capillary refill takes less than 2 seconds. Findings: No rash. Neurological:      General: No focal deficit present. Mental Status: He is alert and oriented to person, place, and time. Psychiatric:         Mood and Affect: Mood normal.         Thought Content:  Thought content normal.         DIAGNOSTIC RESULTS     EKG: All EKG's are interpreted by the Emergency Department Physician who either signs or Co-signs this chart in the absence of a cardiologist.    Normal sinus rhythm, rate 76, left axis deviation, normal intervals, , nonspecific. Change, no STEMI    RADIOLOGY:   Non-plain film images such as CT, Ultrasound and MRI are read by the radiologist. Plain radiographic images are visualized and preliminarily interpreted by the emergency physician with the below findings:    No cardiomegaly, pneumothorax or focal infiltrate    Interpretation per the Radiologist below, if available at the time of this note:    XR CHEST PORTABLE    (Results Pending)         ED BEDSIDE ULTRASOUND:   Performed by ED Physician - none    LABS:  Labs Reviewed   BASIC METABOLIC PANEL W/ REFLEX TO MG FOR LOW K - Abnormal; Notable for the following components:       Result Value    Glucose 106 (*)     CREATININE 0.63 (*)     All other components within normal limits   TROPONIN   CBC WITH AUTO DIFFERENTIAL       All other labs were within normal range or not returned as of this dictation. EMERGENCY DEPARTMENT COURSE and DIFFERENTIAL DIAGNOSIS/MDM:   Vitals:    Vitals:    04/17/22 2029   BP: (!) 139/98   Pulse: 79   Resp: 18   Temp: 98.8 °F (37.1 °C)   SpO2: 95%   Weight: 185 lb (83.9 kg)   Height: 5' 8\" (1.727 m)       Troponin within normal limits    MDM  Number of Diagnoses or Management Options  Diagnosis management comments: It does sound like this may be precipitated by anxiety, stress. Improved with anxiolytic. He actually denied any chest pain. EKG reassuring, nonischemic. Amount and/or Complexity of Data Reviewed  Clinical lab tests: reviewed  Independent visualization of images, tracings, or specimens: yes      HEART score 0, low risk for adverse outcome  PERC-    REASSESSMENT      Patient feeling much better, appropriate for discharge. CONSULTS:  None    PROCEDURES:  Unless otherwise noted below, none     Procedures        FINAL IMPRESSION      1.  Anxiety state 2. Palpitations          DISPOSITION/PLAN   DISPOSITION        PATIENT REFERRED TO:  [address] [facility] [address]  [place] [phone]            DISCHARGE MEDICATIONS:  New Prescriptions    No medications on file     Controlled Substances Monitoring:     No flowsheet data found.     (Please note that portions of this note were completed with a voice recognition program.  Efforts were made to edit the dictations but occasionally words are mis-transcribed.)    Kristen Tay MD (electronically signed)  Attending Emergency Physician           Kristen Tay MD  04/17/22 2131

## 2022-08-02 ENCOUNTER — HOSPITAL ENCOUNTER (INPATIENT)
Age: 37
LOS: 8 days | Discharge: HOME | DRG: 753 | End: 2022-08-10
Attending: EMERGENCY MEDICINE | Admitting: PSYCHIATRY & NEUROLOGY
Payer: MEDICAID

## 2022-08-02 DIAGNOSIS — F25.1 SCHIZOAFFECTIVE DISORDER, DEPRESSIVE TYPE (HCC): Primary | ICD-10-CM

## 2022-08-02 LAB
ACETAMINOPHEN LEVEL: <5 UG/ML (ref 10–30)
ALBUMIN SERPL-MCNC: 4.8 G/DL (ref 3.5–4.6)
ALP BLD-CCNC: 120 U/L (ref 35–104)
ALT SERPL-CCNC: 22 U/L (ref 0–41)
AMPHETAMINE SCREEN, URINE: NORMAL
ANION GAP SERPL CALCULATED.3IONS-SCNC: 13 MEQ/L (ref 9–15)
AST SERPL-CCNC: 24 U/L (ref 0–40)
BARBITURATE SCREEN URINE: NORMAL
BASOPHILS ABSOLUTE: 0.1 K/UL (ref 0–0.2)
BASOPHILS RELATIVE PERCENT: 0.5 %
BENZODIAZEPINE SCREEN, URINE: NORMAL
BILIRUB SERPL-MCNC: <0.2 MG/DL (ref 0.2–0.7)
BILIRUBIN URINE: NEGATIVE
BLOOD, URINE: NEGATIVE
BUN BLDV-MCNC: 15 MG/DL (ref 6–20)
CALCIUM SERPL-MCNC: 9.4 MG/DL (ref 8.5–9.9)
CANNABINOID SCREEN URINE: NORMAL
CHLORIDE BLD-SCNC: 104 MEQ/L (ref 95–107)
CHOLESTEROL, TOTAL: 218 MG/DL (ref 0–199)
CLARITY: CLEAR
CO2: 24 MEQ/L (ref 20–31)
COCAINE METABOLITE SCREEN URINE: NORMAL
COLOR: YELLOW
CREAT SERPL-MCNC: 0.62 MG/DL (ref 0.7–1.2)
EOSINOPHILS ABSOLUTE: 0.4 K/UL (ref 0–0.7)
EOSINOPHILS RELATIVE PERCENT: 3.8 %
ETHANOL PERCENT: NORMAL G/DL
ETHANOL: <10 MG/DL (ref 0–0.08)
FENTANYL SCREEN, URINE: NORMAL
GFR AFRICAN AMERICAN: >60
GFR NON-AFRICAN AMERICAN: >60
GLOBULIN: 3.2 G/DL (ref 2.3–3.5)
GLUCOSE BLD-MCNC: 96 MG/DL (ref 70–99)
GLUCOSE URINE: NEGATIVE MG/DL
HCT VFR BLD CALC: 39 % (ref 42–52)
HDLC SERPL-MCNC: 40 MG/DL (ref 40–59)
HEMOGLOBIN: 13.5 G/DL (ref 14–18)
KETONES, URINE: ABNORMAL MG/DL
LDL CHOLESTEROL CALCULATED: 136 MG/DL (ref 0–129)
LEUKOCYTE ESTERASE, URINE: NEGATIVE
LYMPHOCYTES ABSOLUTE: 1.5 K/UL (ref 1–4.8)
LYMPHOCYTES RELATIVE PERCENT: 14.3 %
Lab: NORMAL
MCH RBC QN AUTO: 31.1 PG (ref 27–31.3)
MCHC RBC AUTO-ENTMCNC: 34.6 % (ref 33–37)
MCV RBC AUTO: 89.9 FL (ref 80–100)
METHADONE SCREEN, URINE: NORMAL
MONOCYTES ABSOLUTE: 0.8 K/UL (ref 0.2–0.8)
MONOCYTES RELATIVE PERCENT: 7.3 %
NEUTROPHILS ABSOLUTE: 7.7 K/UL (ref 1.4–6.5)
NEUTROPHILS RELATIVE PERCENT: 74.1 %
NITRITE, URINE: NEGATIVE
OPIATE SCREEN URINE: NORMAL
OXYCODONE URINE: NORMAL
PDW BLD-RTO: 12.4 % (ref 11.5–14.5)
PH UA: 7 (ref 5–9)
PHENCYCLIDINE SCREEN URINE: NORMAL
PLATELET # BLD: 321 K/UL (ref 130–400)
POTASSIUM SERPL-SCNC: 4.4 MEQ/L (ref 3.4–4.9)
PROPOXYPHENE SCREEN: NORMAL
PROTEIN UA: NEGATIVE MG/DL
RBC # BLD: 4.34 M/UL (ref 4.7–6.1)
SALICYLATE, SERUM: <0.3 MG/DL (ref 15–30)
SARS-COV-2, NAAT: NOT DETECTED
SODIUM BLD-SCNC: 141 MEQ/L (ref 135–144)
SPECIFIC GRAVITY UA: 1.03 (ref 1–1.03)
TOTAL CK: 127 U/L (ref 0–190)
TOTAL PROTEIN: 8 G/DL (ref 6.3–8)
TRIGL SERPL-MCNC: 211 MG/DL (ref 0–150)
TSH SERPL DL<=0.05 MIU/L-ACNC: 0.8 UIU/ML (ref 0.44–3.86)
URINE REFLEX TO CULTURE: ABNORMAL
UROBILINOGEN, URINE: 0.2 E.U./DL
WBC # BLD: 10.4 K/UL (ref 4.8–10.8)

## 2022-08-02 PROCEDURE — 80143 DRUG ASSAY ACETAMINOPHEN: CPT

## 2022-08-02 PROCEDURE — 80179 DRUG ASSAY SALICYLATE: CPT

## 2022-08-02 PROCEDURE — 99283 EMERGENCY DEPT VISIT LOW MDM: CPT

## 2022-08-02 PROCEDURE — 84443 ASSAY THYROID STIM HORMONE: CPT

## 2022-08-02 PROCEDURE — 87635 SARS-COV-2 COVID-19 AMP PRB: CPT

## 2022-08-02 PROCEDURE — 36415 COLL VENOUS BLD VENIPUNCTURE: CPT

## 2022-08-02 PROCEDURE — 81003 URINALYSIS AUTO W/O SCOPE: CPT

## 2022-08-02 PROCEDURE — 82077 ASSAY SPEC XCP UR&BREATH IA: CPT

## 2022-08-02 PROCEDURE — 80307 DRUG TEST PRSMV CHEM ANLYZR: CPT

## 2022-08-02 PROCEDURE — 80053 COMPREHEN METABOLIC PANEL: CPT

## 2022-08-02 PROCEDURE — 93005 ELECTROCARDIOGRAM TRACING: CPT | Performed by: PSYCHIATRY & NEUROLOGY

## 2022-08-02 PROCEDURE — 80061 LIPID PANEL: CPT

## 2022-08-02 PROCEDURE — 6370000000 HC RX 637 (ALT 250 FOR IP): Performed by: PSYCHIATRY & NEUROLOGY

## 2022-08-02 PROCEDURE — 1240000000 HC EMOTIONAL WELLNESS R&B

## 2022-08-02 PROCEDURE — 85025 COMPLETE CBC W/AUTO DIFF WBC: CPT

## 2022-08-02 PROCEDURE — 82550 ASSAY OF CK (CPK): CPT

## 2022-08-02 RX ORDER — LORAZEPAM 2 MG/ML
2 INJECTION INTRAMUSCULAR
Status: DISCONTINUED | OUTPATIENT
Start: 2022-08-02 | End: 2022-08-09

## 2022-08-02 RX ORDER — LORAZEPAM 1 MG/1
1 TABLET ORAL
Status: DISCONTINUED | OUTPATIENT
Start: 2022-08-02 | End: 2022-08-09

## 2022-08-02 RX ORDER — LORAZEPAM 1 MG/1
2 TABLET ORAL
Status: DISCONTINUED | OUTPATIENT
Start: 2022-08-02 | End: 2022-08-09

## 2022-08-02 RX ORDER — FLUTICASONE PROPIONATE 50 MCG
1 SPRAY, SUSPENSION (ML) NASAL NIGHTLY PRN
Status: DISCONTINUED | OUTPATIENT
Start: 2022-08-02 | End: 2022-08-10 | Stop reason: HOSPADM

## 2022-08-02 RX ORDER — HALOPERIDOL 5 MG
5 TABLET ORAL EVERY 6 HOURS PRN
Status: DISCONTINUED | OUTPATIENT
Start: 2022-08-02 | End: 2022-08-10 | Stop reason: HOSPADM

## 2022-08-02 RX ORDER — HYDROXYZINE 50 MG/1
50 TABLET, FILM COATED ORAL 3 TIMES DAILY PRN
COMMUNITY
Start: 2022-04-20 | End: 2022-08-02

## 2022-08-02 RX ORDER — OLANZAPINE 5 MG/1
5 TABLET ORAL NIGHTLY
Status: DISCONTINUED | OUTPATIENT
Start: 2022-08-02 | End: 2022-08-03

## 2022-08-02 RX ORDER — FINASTERIDE 5 MG/1
1 TABLET, FILM COATED ORAL DAILY
COMMUNITY

## 2022-08-02 RX ORDER — ACETAMINOPHEN 325 MG/1
650 TABLET ORAL EVERY 4 HOURS PRN
Status: DISCONTINUED | OUTPATIENT
Start: 2022-08-02 | End: 2022-08-10 | Stop reason: HOSPADM

## 2022-08-02 RX ORDER — LORAZEPAM 0.5 MG/1
0.75 TABLET ORAL 3 TIMES DAILY
Status: ON HOLD | COMMUNITY
Start: 2022-07-28 | End: 2022-08-10 | Stop reason: HOSPADM

## 2022-08-02 RX ORDER — FLUTICASONE PROPIONATE 50 MCG
1 SPRAY, SUSPENSION (ML) NASAL NIGHTLY PRN
COMMUNITY

## 2022-08-02 RX ORDER — HYDROXYZINE HYDROCHLORIDE 50 MG/ML
50 INJECTION, SOLUTION INTRAMUSCULAR EVERY 6 HOURS PRN
Status: DISCONTINUED | OUTPATIENT
Start: 2022-08-02 | End: 2022-08-10 | Stop reason: HOSPADM

## 2022-08-02 RX ORDER — LORATADINE 10 MG/1
10 CAPSULE, LIQUID FILLED ORAL DAILY
COMMUNITY

## 2022-08-02 RX ORDER — LORAZEPAM 2 MG/ML
4 INJECTION INTRAMUSCULAR
Status: DISCONTINUED | OUTPATIENT
Start: 2022-08-02 | End: 2022-08-09

## 2022-08-02 RX ORDER — BUPRENORPHINE HYDROCHLORIDE AND NALOXONE HYDROCHLORIDE DIHYDRATE 8; 2 MG/1; MG/1
2 TABLET SUBLINGUAL DAILY
Status: DISCONTINUED | OUTPATIENT
Start: 2022-08-03 | End: 2022-08-10 | Stop reason: HOSPADM

## 2022-08-02 RX ORDER — OLANZAPINE 5 MG/1
5 TABLET ORAL NIGHTLY
COMMUNITY
Start: 2022-07-28 | End: 2022-09-10

## 2022-08-02 RX ORDER — TRAZODONE HYDROCHLORIDE 50 MG/1
50 TABLET ORAL NIGHTLY PRN
Status: DISCONTINUED | OUTPATIENT
Start: 2022-08-02 | End: 2022-08-10 | Stop reason: HOSPADM

## 2022-08-02 RX ORDER — MAGNESIUM HYDROXIDE/ALUMINUM HYDROXICE/SIMETHICONE 120; 1200; 1200 MG/30ML; MG/30ML; MG/30ML
30 SUSPENSION ORAL PRN
Status: DISCONTINUED | OUTPATIENT
Start: 2022-08-02 | End: 2022-08-10 | Stop reason: HOSPADM

## 2022-08-02 RX ORDER — LANOLIN ALCOHOL/MO/W.PET/CERES
100 CREAM (GRAM) TOPICAL DAILY
Status: DISCONTINUED | OUTPATIENT
Start: 2022-08-02 | End: 2022-08-10 | Stop reason: HOSPADM

## 2022-08-02 RX ORDER — HYDROXYZINE PAMOATE 50 MG/1
50 CAPSULE ORAL EVERY 6 HOURS PRN
Status: DISCONTINUED | OUTPATIENT
Start: 2022-08-02 | End: 2022-08-10 | Stop reason: HOSPADM

## 2022-08-02 RX ORDER — LORAZEPAM 2 MG/ML
1 INJECTION INTRAMUSCULAR
Status: DISCONTINUED | OUTPATIENT
Start: 2022-08-02 | End: 2022-08-09

## 2022-08-02 RX ORDER — KETOCONAZOLE 20 MG/ML
1 SHAMPOO TOPICAL DAILY PRN
Status: DISCONTINUED | OUTPATIENT
Start: 2022-08-02 | End: 2022-08-10 | Stop reason: HOSPADM

## 2022-08-02 RX ORDER — LORAZEPAM 2 MG/ML
3 INJECTION INTRAMUSCULAR
Status: DISCONTINUED | OUTPATIENT
Start: 2022-08-02 | End: 2022-08-09

## 2022-08-02 RX ORDER — POLYETHYLENE GLYCOL 3350 17 G
2 POWDER IN PACKET (EA) ORAL
Status: DISCONTINUED | OUTPATIENT
Start: 2022-08-02 | End: 2022-08-10 | Stop reason: HOSPADM

## 2022-08-02 RX ORDER — FINASTERIDE 5 MG/1
1.25 TABLET, FILM COATED ORAL DAILY
Status: DISCONTINUED | OUTPATIENT
Start: 2022-08-03 | End: 2022-08-10 | Stop reason: HOSPADM

## 2022-08-02 RX ORDER — M-VIT,TX,IRON,MINS/CALC/FOLIC 27MG-0.4MG
1 TABLET ORAL NIGHTLY
COMMUNITY

## 2022-08-02 RX ORDER — LORAZEPAM 1 MG/1
3 TABLET ORAL
Status: DISCONTINUED | OUTPATIENT
Start: 2022-08-02 | End: 2022-08-09

## 2022-08-02 RX ORDER — LORAZEPAM 1 MG/1
4 TABLET ORAL
Status: DISCONTINUED | OUTPATIENT
Start: 2022-08-02 | End: 2022-08-09

## 2022-08-02 RX ORDER — BENZTROPINE MESYLATE 1 MG/ML
2 INJECTION INTRAMUSCULAR; INTRAVENOUS 2 TIMES DAILY PRN
Status: DISCONTINUED | OUTPATIENT
Start: 2022-08-02 | End: 2022-08-10 | Stop reason: HOSPADM

## 2022-08-02 RX ORDER — CETIRIZINE HYDROCHLORIDE 10 MG/1
10 TABLET ORAL DAILY
Status: DISCONTINUED | OUTPATIENT
Start: 2022-08-03 | End: 2022-08-10 | Stop reason: HOSPADM

## 2022-08-02 RX ORDER — HALOPERIDOL 5 MG/ML
5 INJECTION INTRAMUSCULAR EVERY 6 HOURS PRN
Status: DISCONTINUED | OUTPATIENT
Start: 2022-08-02 | End: 2022-08-10 | Stop reason: HOSPADM

## 2022-08-02 RX ORDER — M-VIT,TX,IRON,MINS/CALC/FOLIC 27MG-0.4MG
1 TABLET ORAL NIGHTLY
Status: DISCONTINUED | OUTPATIENT
Start: 2022-08-02 | End: 2022-08-10 | Stop reason: HOSPADM

## 2022-08-02 RX ORDER — KETOCONAZOLE 20 MG/ML
1 SHAMPOO TOPICAL DAILY PRN
COMMUNITY

## 2022-08-02 RX ADMIN — Medication 1 TABLET: at 21:11

## 2022-08-02 RX ADMIN — OLANZAPINE 5 MG: 5 TABLET, FILM COATED ORAL at 21:11

## 2022-08-02 RX ADMIN — Medication 100 MG: at 17:25

## 2022-08-02 ASSESSMENT — PAIN - FUNCTIONAL ASSESSMENT: PAIN_FUNCTIONAL_ASSESSMENT: NONE - DENIES PAIN

## 2022-08-02 NOTE — PROGRESS NOTES
Patient extremely drowsy, sleeping in bed. Pt declines to complete admission assessment at this time.  Electronically signed by Urban Springer RN on 8/2/22 at 5:03 PM EDT

## 2022-08-02 NOTE — ED TRIAGE NOTES
Pink sheet by DEVIN D/T mother reporting Patient is manic, has not slept in 4 days, & made self-harm statements. Patient denies suicidal and/or homocidal ideation.

## 2022-08-02 NOTE — ED NOTES
[employer] notified of need for 3 WT escort & Patient belongings.      hSerif Toro RN  08/02/22 0423

## 2022-08-02 NOTE — ED PROVIDER NOTES
[facility] ED  EMERGENCY DEPARTMENT ENCOUNTER      Pt Name: Jonathan Villalpando  MRN: [id number]  [place] 1985  Date of evaluation: 8/2/2022  Provider: Jodie Bernard MD    CHIEF COMPLAINT       Chief Complaint   Patient presents with    Suicidal         HISTORY OF PRESENT ILLNESS   (Location/Symptom, Timing/Onset, Context/Setting, Quality, Duration, Modifying Factors, Severity)  Note limiting factors. 79-year-old male presenting for psychiatric evaluation. [place] slipped here by [facility] for \"manic behavior. \"  Patient states that he was in an argument with his mother and she accused him of making suicidal statements. This is why 911 was called. He denies making these and states he is not suicidal or homicidal to me. Notes no other complaints. Nursing Notes were reviewed. REVIEW OF SYSTEMS    (2-9 systems for level 4, 10 or more for level 5)     Review of Systems   Unable to perform ROS: Psychiatric disorder     Except as noted above the remainder of the review of systems was reviewed and negative. PAST MEDICAL HISTORY   History reviewed. No pertinent past medical history. SURGICAL HISTORY     History reviewed. No pertinent surgical history. CURRENT MEDICATIONS       Previous Medications    BUPRENORPHINE HCL-NALOXONE HCL (SUBOXONE SL)    Place 8 mg under the tongue 2 times daily Patient takes 16 mg SubLINGual daily    FINASTERIDE (PROSCAR) 5 MG TABLET    Take 1 mg by mouth in the morning. FLUTICASONE (FLONASE) 50 MCG/ACT NASAL SPRAY    1 spray by Each Nostril route nightly as needed for Allergies    KETOCONAZOLE (NIZORAL) 2 % SHAMPOO    Apply 1 Applicatorful topically daily as needed for Itching    LORATADINE (CLARITIN) 10 MG CAPSULE    Take 10 mg by mouth in the morning. LORAZEPAM (ATIVAN) 0.5 MG TABLET    Take 0.75 mg by mouth in the morning and 0.75 mg at noon and 0.75 mg before bedtime.     MULTIPLE VITAMINS-MINERALS (THERAPEUTIC MULTIVITAMIN-MINERALS) TABLET    Take 1 tablet by mouth at bedtime    OLANZAPINE (ZYPREXA) 5 MG TABLET    Take 5 mg by mouth nightly       ALLERGIES     Seasonal    FAMILY HISTORY     History reviewed. No pertinent family history. SOCIAL HISTORY       Social History     Socioeconomic History    Marital status: Single     Spouse name: None    Number of children: None    Years of education: None    Highest education level: None   Tobacco Use    Smoking status: Every Day     Packs/day: 1.00     Types: Cigarettes    Smokeless tobacco: Never   Vaping Use    Vaping Use: Never used   Substance and Sexual Activity    Alcohol use: Yes     Comment: socially    Drug use: Yes     Types: Opiates , Methamphetamines (Crystal Meth), Marijuana (Weed)     Comment: \"poppyseed tea\"       SCREENINGS               PHYSICAL EXAM    (up to 7 for level 4, 8 or more for level 5)     ED Triage Vitals [08/02/22 1134]   BP Temp Temp Source Heart Rate Resp SpO2 Height Weight   133/76 99 °F (37.2 °C) Oral 100 18 96 % 5' 8\" (1.727 m) 185 lb (83.9 kg)       Physical Exam  Vitals and nursing note reviewed. Constitutional:       General: He is not in acute distress. Appearance: Normal appearance. He is well-developed. He is not ill-appearing. HENT:      Head: Normocephalic and atraumatic. Mouth/Throat:      Mouth: Mucous membranes are moist.      Pharynx: Oropharynx is clear. Eyes:      Extraocular Movements: Extraocular movements intact. Conjunctiva/sclera: Conjunctivae normal.   Cardiovascular:      Rate and Rhythm: Normal rate and regular rhythm. Pulmonary:      Effort: Pulmonary effort is normal.      Breath sounds: Normal breath sounds. Abdominal:      General: Bowel sounds are normal.      Palpations: Abdomen is soft. Tenderness: There is no abdominal tenderness. Musculoskeletal:         General: No deformity. Normal range of motion. Cervical back: Normal range of motion and neck supple. Skin:     General: Skin is warm and dry.       Capillary Refill: Capillary refill takes less than 2 seconds. Neurological:      General: No focal deficit present. Mental Status: He is alert and oriented to person, place, and time. Mental status is at baseline. Cranial Nerves: No cranial nerve deficit. Psychiatric:         Thought Content:  Thought content normal.       DIAGNOSTIC RESULTS     EKG: All EKG's are interpreted by the Emergency Department Physician who either signs or Co-signs this chart in the absence of a cardiologist.    RADIOLOGY:   Non-plain film images such as CT, Ultrasound and MRI are read by the radiologist. Plain radiographic images are visualized and preliminarily interpreted by the emergency physician with the below findings:    Interpretation per the Radiologist below, if available at the time of this note:    No orders to display       LABS:  Labs Reviewed   ACETAMINOPHEN LEVEL - Abnormal; Notable for the following components:       Result Value    Acetaminophen Level <5 (*)     All other components within normal limits   CBC WITH AUTO DIFFERENTIAL - Abnormal; Notable for the following components:    RBC 4.34 (*)     Hemoglobin 13.5 (*)     Hematocrit 39.0 (*)     Neutrophils Absolute 7.7 (*)     All other components within normal limits   COMPREHENSIVE METABOLIC PANEL - Abnormal; Notable for the following components:    Creatinine 0.62 (*)     Albumin 4.8 (*)     Alkaline Phosphatase 120 (*)     All other components within normal limits   URINALYSIS WITH REFLEX TO CULTURE - Abnormal; Notable for the following components:    Ketones, Urine TRACE (*)     All other components within normal limits   SALICYLATE LEVEL - Abnormal; Notable for the following components:    Salicylate, Serum <1.3 (*)     All other components within normal limits   LIPID PANEL - Abnormal; Notable for the following components:    Cholesterol, Total 218 (*)     Triglycerides 211 (*)     LDL Calculated 136 (*)     All other components within normal limits COVID-19, RAPID   CK   ETHANOL   URINE DRUG SCREEN   TSH       All other labs were within normal range or not returned as of this dictation. EMERGENCY DEPARTMENT COURSE and DIFFERENTIAL DIAGNOSIS/MDM:   Vitals:    Vitals:    08/02/22 1134   BP: 133/76   Pulse: 100   Resp: 18   Temp: 99 °F (37.2 °C)   TempSrc: Oral   SpO2: 96%   Weight: 185 lb (83.9 kg)   Height: 5' 8\" (1.727 m)       MDM  Number of Diagnoses or Management Options  Schizoaffective disorder, depressive type ([facility] [address]  Diagnosis management comments: Medically cleared for  eval.  Plan to admit to 3w dx: schizoaffective, depressive type        Procedures    CRITICAL CARE TIME   Total Critical Care time was 0 minutes, excluding separately reportable procedures. There was a high probability of clinically significant/life threatening deterioration in the patient's condition which required my urgent intervention. FINAL IMPRESSION      1.  Schizoaffective disorder, depressive type [facility])          DISPOSITION/PLAN   DISPOSITION Decision To Admit 08/02/2022 03:14:46 PM      (Please note that portions of this note were completed with a voice recognition program.  Efforts were made to edit the dictations but occasionally words are mis-transcribed.)    Kenya Chatman MD (electronically signed)  Attending Emergency Physician        Kenya Chatman MD  08/02/22 [id number]

## 2022-08-02 NOTE — PROGRESS NOTES
Patient arrived to unit via wheelchair accompanied by staff. Skin assessment and contraband check complete by this nurse and Tracy Lu. Skin intact and no contraband found. Patient states he does not want oriented to unit because he has been here before and is familiar. Pt provided with hygiene supplies and water pitcher.  Electronically signed by Rita Abdi RN on 8/2/22 at 4:36 PM EDT

## 2022-08-02 NOTE — ED NOTES
Provisional Diagnosis:     Depression, unspecified  Schizoaffective disorder, depression type, per chart review    Psychosocial and Contextual Factors: On disability  Completed highschool  Lives in [address] with his mother whom he reports is on hospice  Per chart review, patient discharged from [facility] psych 7/28/2022 with Psychology f/u 8/9 & Psychiatry f/u 8/11    C-SSRS Summary:    C-SSRS Suicide Screening -   1) Within the past month, have you wished you were dead or wished you could go to sleep and not wake up? : No    2) Have you actually had any thoughts of killing yourself? : No    6) Have you ever done anything, started to do anything, or prepared to do anything to end your life?: No    Risk of Suicide: No Risk      Patient: Appears internally stimulated in bed frequently switching positions with some noted exaggerated gestures and movements, making bizarre sounds or laughing inappropriately to himself nearly constantly    Family: Reports only in contact with his mother who he does not get along with  Agency: Reports following up with providers at [facility]    Substance Abuse: Reports a history of Amphetamine, Methamphetamine, Marijuana and Opioid use; States he is sober now; On Suboxone Maintenance. Present Suicidal Behavior:  Patient denies feeling suicidal    Verbal: Patient denies feeling suicidal. Prior to arrival patient reports his mother made claims he was suicidal.     Attempt: No suicide attempt    Past Suicidal Behavior: Patient denies past suicide attempts. Self-Injurious/Self-Mutilation: None observed, patient denies. Violence Current or Past: None observed on unit, patient denies. Trauma Identified:    Physical Abuse: Denies  Verbal Abuse: Denies  Emotional abuse: Denies  Financial Abuse: Denies  Sexual abuse: Denies    Protective Factors:     Identifies reason for living: His dog  Will to live  Fear of death    Risk Factors:    History of delusions and hallucinations

## 2022-08-02 NOTE — ED NOTES
Changed into [facility] clothing. Skin check done with noted. Contraband check done with no contraband found @ this time. Covid obtained & sent to lab. Tolerated procedure well.      Alf Mcgill RN  08/02/22 [id number]

## 2022-08-02 NOTE — ED NOTES
Urine specimen obtained & sent to lab. Lab @ bedside.  Tolerated lab draw well     Vic Yeager RN  08/02/22 [id number]

## 2022-08-03 LAB
ALBUMIN SERPL-MCNC: 3.9 G/DL (ref 3.5–4.6)
ALP BLD-CCNC: 94 U/L (ref 35–104)
ALT SERPL-CCNC: 15 U/L (ref 0–41)
ANION GAP SERPL CALCULATED.3IONS-SCNC: 10 MEQ/L (ref 9–15)
AST SERPL-CCNC: 17 U/L (ref 0–40)
BILIRUB SERPL-MCNC: <0.2 MG/DL (ref 0.2–0.7)
BUN BLDV-MCNC: 13 MG/DL (ref 6–20)
CALCIUM SERPL-MCNC: 8.7 MG/DL (ref 8.5–9.9)
CHLORIDE BLD-SCNC: 108 MEQ/L (ref 95–107)
CO2: 25 MEQ/L (ref 20–31)
CREAT SERPL-MCNC: 0.6 MG/DL (ref 0.7–1.2)
EKG ATRIAL RATE: 76 BPM
EKG P AXIS: 45 DEGREES
EKG P-R INTERVAL: 134 MS
EKG Q-T INTERVAL: 414 MS
EKG QRS DURATION: 104 MS
EKG QTC CALCULATION (BAZETT): 465 MS
EKG R AXIS: -16 DEGREES
EKG T AXIS: 15 DEGREES
EKG VENTRICULAR RATE: 76 BPM
GFR AFRICAN AMERICAN: >60
GFR NON-AFRICAN AMERICAN: >60
GLOBULIN: 2.4 G/DL (ref 2.3–3.5)
GLUCOSE BLD-MCNC: 89 MG/DL (ref 70–99)
POTASSIUM REFLEX MAGNESIUM: 4.5 MEQ/L (ref 3.4–4.9)
SODIUM BLD-SCNC: 143 MEQ/L (ref 135–144)
TOTAL PROTEIN: 6.3 G/DL (ref 6.3–8)

## 2022-08-03 PROCEDURE — 80053 COMPREHEN METABOLIC PANEL: CPT

## 2022-08-03 PROCEDURE — 93010 ELECTROCARDIOGRAM REPORT: CPT | Performed by: INTERNAL MEDICINE

## 2022-08-03 PROCEDURE — 36415 COLL VENOUS BLD VENIPUNCTURE: CPT

## 2022-08-03 PROCEDURE — 99223 1ST HOSP IP/OBS HIGH 75: CPT | Performed by: PSYCHIATRY & NEUROLOGY

## 2022-08-03 PROCEDURE — 6370000000 HC RX 637 (ALT 250 FOR IP): Performed by: PSYCHIATRY & NEUROLOGY

## 2022-08-03 PROCEDURE — 6360000002 HC RX W HCPCS: Performed by: PSYCHIATRY & NEUROLOGY

## 2022-08-03 PROCEDURE — 1240000000 HC EMOTIONAL WELLNESS R&B

## 2022-08-03 RX ORDER — DIVALPROEX SODIUM 250 MG/1
250 TABLET, DELAYED RELEASE ORAL EVERY 8 HOURS SCHEDULED
Status: DISCONTINUED | OUTPATIENT
Start: 2022-08-03 | End: 2022-08-10 | Stop reason: HOSPADM

## 2022-08-03 RX ORDER — QUETIAPINE FUMARATE 50 MG/1
50 TABLET, FILM COATED ORAL NIGHTLY
Status: DISCONTINUED | OUTPATIENT
Start: 2022-08-03 | End: 2022-08-07

## 2022-08-03 RX ADMIN — Medication 100 MG: at 09:31

## 2022-08-03 RX ADMIN — Medication 1 TABLET: at 21:33

## 2022-08-03 RX ADMIN — BUPRENORPHINE HYDROCHLORIDE AND NALOXONE HYDROCHLORIDE DIHYDRATE 2 TABLET: 8; 2 TABLET SUBLINGUAL at 09:12

## 2022-08-03 RX ADMIN — DIVALPROEX SODIUM 250 MG: 250 TABLET, DELAYED RELEASE ORAL at 21:33

## 2022-08-03 RX ADMIN — QUETIAPINE 50 MG: 50 TABLET ORAL at 21:33

## 2022-08-03 RX ADMIN — FINASTERIDE 1.25 MG: 5 TABLET, FILM COATED ORAL at 09:11

## 2022-08-03 RX ADMIN — DIVALPROEX SODIUM 250 MG: 250 TABLET, DELAYED RELEASE ORAL at 13:23

## 2022-08-03 RX ADMIN — HYDROXYZINE PAMOATE 50 MG: 50 CAPSULE ORAL at 09:12

## 2022-08-03 RX ADMIN — CETIRIZINE HYDROCHLORIDE 10 MG: 10 TABLET, FILM COATED ORAL at 09:12

## 2022-08-03 ASSESSMENT — SLEEP AND FATIGUE QUESTIONNAIRES
DO YOU USE A SLEEP AID: NO
AVERAGE NUMBER OF SLEEP HOURS: 8
DO YOU HAVE DIFFICULTY SLEEPING: NO
SLEEP PATTERN: NORMAL

## 2022-08-03 ASSESSMENT — LIFESTYLE VARIABLES
HOW MANY STANDARD DRINKS CONTAINING ALCOHOL DO YOU HAVE ON A TYPICAL DAY: PATIENT DOES NOT DRINK
HOW OFTEN DO YOU HAVE A DRINK CONTAINING ALCOHOL: NEVER

## 2022-08-03 NOTE — PROGRESS NOTES
Patient reluctant to complete admission assessment process stating he was too sleepy to participate.

## 2022-08-03 NOTE — PROGRESS NOTES
Behavioral Services  Medicare Certification Upon Admission    I certify that this patient's inpatient psychiatric hospital admission is medically necessary for:    [x] (1) Treatment which could reasonably be expected to improve this patient's condition,       [x] (2) Or for diagnostic study;     AND     [x](2) The inpatient psychiatric services are provided while the individual is under the care of a physician and are included in the individualized plan of care.     Estimated length of stay/service 3-5    Plan for post-hospital care OP care    Electronically signed by Roverto Oden MD on 8/3/2022 at 10:13 AM

## 2022-08-03 NOTE — CARE COORDINATION
Brief Intervention and Referral to Treatment Summary    Patient was provided PHQ-9, AUDIT-C and DAST Screening:      PHQ-9 Score: nc  AUDIT-C Score:  0  DAST Score:  0    Patients substance use is considered     Low Risk/Healthy X   Moderate Risk  Harmful  Dependent    Patients depression is considered:  nc    Minimal  Mild   Moderate  Moderately Severe  Severe    Brief Education Was Provided n/a     Patient was receptive  Patient was not receptive      Brief Intervention Is Provided (Only for AUDIT-C or DAST)  n/a     Patient reports readiness to decrease and/or stop use and a plan was discussed   Patient denies readiness to decrease and/or stop use and a plan was not discussed      Recommendations/Referrals for Brief and/or Specialized Treatment Provided to Patient     Pt uds and etoh are negative. Denies concerns with AOD use. Reports being linked with [facility] for med management and counseling.  Electronically signed by MARK Leary on 8/3/2022 at 3:13 PM

## 2022-08-03 NOTE — PROGRESS NOTES
Pt. declined to attend the 0900 community meeting, despite staff encouragement.  GOAL : \" to get up and to get moving\" Electronically signed by Brent Flores on 8/3/2022 at 11:26 AM

## 2022-08-03 NOTE — H&P
PSYCHIATRIC EVALUATION      CHIEF COMPLAINT:  Depressed mood, psychotic symptoms, increased aggression    HISTORY OF PRESENT ILLNESS:     ER report: Sammie Randhawa was brought in by EMS on a pink slip from [facility] for being manic in the context of not sleeping for 4 days and also a concern for safety as his mother states he made suicidal statements. Upon assessment, patient is tangential and requires redirection to stay on topic. He denies feeling suicidal, homicidal, or having hallucinations. He admits to recent admission at [facility] and states that he does not need to be admitted today. He also states \"I've had delusions and hallucinations before I don't have any of that today, I'm very familiar\". Sammie Randhawa admits to poor sleep stating he has only slept 2 hours each night since he was discharged from [facility], but reports sleeping well while in the hospital. Other symptoms include feeling distracted, and having racing thoughts. He minimizes the events leading up to this encounter and states it was due to his mother \"nit picking\" at him after he spilled milk which caused a fight. He states during the verbal altercation with his mother, she got mad and \"knows how to lock me up\". During interview:    36y/o, single,live with mom, unemployed with H/O Bipolar disorder  Pt was in a argument with mom who called the  and EMS  Pt denies making suicidal threats which his mom claimed that he was.  Pt admit to taking modafanil 1-2 pill per day for past 4 days - purchased online-   Pt has not been sleeping and manic  Pt was admitted to [address] [place] [address] service for same reason  Discharged with zyprexa, ativan and suboxone on 7/28  Was feeling too tired from zyprexa and so was using Modafanil    PSYCHIATRIC REVIEW OF SYMPTOMS:  Depression: + Depressed mood, + Sleep disturbance and + Decreased energy +irritability with no suicidal thoughts, intent or plan  Syeda: Decrease need for sleep, More talkative and Irritable mood  Psychosis: Denies any auditory / visual hallucination or paranoid ideation. ANA: Difficulty controlling worry, Restless / \"Keyed up\", Fatigued, Irritable and Sleep disturbance  OCD: Denies any symptoms of OCD. PTSD: Denies any PTSD symptoms. PAST PSYCHIATRIC HISTORY:   PSYCHIATRIC HISTORY:   Prior Diagnosis: Schizoaffective disorder vs. Bipolar disorder, opioid use d/o, stimulant use d/o  Current Psychiatrist: Dr. Shilpi Campbell  Current Therapist: n/a  Total Previous Hospitalizations: 5   Last Hospitalization: //2022  History of Suicide Attempts:   - Total: 0  - Methods: n/a   - Last Attempt: n/a  Previous Psychiatric Medication Trials:  Zoloft, Latuda, Trazodone, Wellbutrin, Latuda, Suboxone, Adderall, clonidine, Atarax, Viibryd, Abilify  Current Psychiatric Medications:   Suboxone 16 mg daily - filled 7/13/22  Clozapine 75 mg daily - filled 7/12/22  Lexapro 5 mg daily- filled 7/9/22  Ativan 0.75 TID - filled 6/28/22  Clonazepam 0.5mg - filled 6/30/22      PAST MEDICAL HISTORY: History reviewed. No pertinent past medical history. ALLERGIES: Seasonal    FAMILY PSYCHIATRIC HISTORY: mother's brother has Bipolar Disorder; mother has depression. SOCIAL HISTORY: He was born in [place], and raised in [place]. He lived in [place] until 12/2018. He came here with his mother to his grandmother's home. He is a high school graduate. He had one year in college; studied Psychology, but quit because of \"family issues\". He worked a couple of jobs, in a [employer], then [employer]. He is currently unemployed. He is single, childless. SUBSTANCE ABUSE HISTORY: He smokes, 6-8 cigarettes/day. He denied drinking alcohol. He acknowledged using opioids, starting with pills. He denied having used iv drugs. He said he had \"tried a lot of drugs\" when he was younger, but denied that he is using other drugs now.  Pt gets suboxone from Dr Mac Arias: /67   Pulse 70   Temp 97.7 °F (36.5 °C) (Oral)   Resp 18 Ht 5' 8\" (1.727 m)   Wt 185 lb (83.9 kg)   SpO2 96%   BMI 28.13 kg/m²     MENTAL STATUS EXAM: Appearance: alert, fair grooming Behavior: evasive, superficially cooperative Mood: depressed Affect: constricted Speech: with normal rate, low volume Thought Process: seems to be with some ? thought blocking vs. Conscious evasiveness in answering questions Thought Content: denies suicidal or homicidal ideations (however, collateral from mother obtained in the ER mentions that he had expressed suicidal ideations prior to admission), denies paranoia currently (mother mentioned though that he had been increasingly paranoid) Perception: denies hallucinations Orientation: oriented to time, place, self Concentration: fair Memory: fair Abstraction: seems to be able to demonstrate some abstract thinking Fund of knowledge: fair Insight: limited Judgement: limited    LABS:   Admission on 08/02/2022   Component Date Value Ref Range Status    Acetaminophen Level 08/02/2022 <5 (A) 10 - 30 ug/mL Final    WBC 08/02/2022 10.4  4.8 - 10.8 K/uL Final    RBC 08/02/2022 4.34 (A) 4.70 - 6.10 M/uL Final    Hemoglobin 08/02/2022 13.5 (A) 14.0 - 18.0 g/dL Final    Hematocrit 08/02/2022 39.0 (A) 42.0 - 52.0 % Final    MCV 08/02/2022 89.9  80.0 - 100.0 fL Final    MCH 08/02/2022 31.1  27.0 - 31.3 pg Final    MCHC 08/02/2022 34.6  33.0 - 37.0 % Final    RDW 08/02/2022 12.4  11.5 - 14.5 % Final    Platelets 70/29/6942 321  130 - 400 K/uL Final    Neutrophils % 08/02/2022 74.1  % Final    Lymphocytes % 08/02/2022 14.3  % Final    Monocytes % 08/02/2022 7.3  % Final    Eosinophils % 08/02/2022 3.8  % Final    Basophils % 08/02/2022 0.5  % Final    Neutrophils Absolute 08/02/2022 7.7 (A) 1.4 - 6.5 K/uL Final    Lymphocytes Absolute 08/02/2022 1.5  1.0 - 4.8 K/uL Final    Monocytes Absolute 08/02/2022 0.8  0.2 - 0.8 K/uL Final    Eosinophils Absolute 08/02/2022 0.4  0.0 - 0.7 K/uL Final    Basophils Absolute 08/02/2022 0.1  0.0 - 0.2 K/uL Final Total CK 08/02/2022 127  0 - 190 U/L Final    Sodium 08/02/2022 141  135 - 144 mEq/L Final    Potassium 08/02/2022 4.4  3.4 - 4.9 mEq/L Final    Chloride 08/02/2022 104  95 - 107 mEq/L Final    CO2 08/02/2022 24  20 - 31 mEq/L Final    Anion Gap 08/02/2022 13  9 - 15 mEq/L Final    Glucose 08/02/2022 96  70 - 99 mg/dL Final    BUN 08/02/2022 15  6 - 20 mg/dL Final    Creatinine 08/02/2022 0.62 (A) 0.70 - 1.20 mg/dL Final    GFR Non- 08/02/2022 >60.0  >60 Final    Comment: >60 mL/min/1.73m2 EGFR, calc. for ages 25 and older using the  MDRD formula (not corrected for weight), is valid for stable  renal function. GFR  08/02/2022 >60.0  >60 Final    Comment: >60 mL/min/1.73m2 EGFR, calc. for ages 25 and older using the  MDRD formula (not corrected for weight), is valid for stable  renal function. Calcium 08/02/2022 9.4  8.5 - 9.9 mg/dL Final    Total Protein 08/02/2022 8.0  6.3 - 8.0 g/dL Final    Albumin 08/02/2022 4.8 (A) 3.5 - 4.6 g/dL Final    Total Bilirubin 08/02/2022 <0.2  0.2 - 0.7 mg/dL Final    Alkaline Phosphatase 08/02/2022 120 (A) 35 - 104 U/L Final    ALT 08/02/2022 22  0 - 41 U/L Final    AST 08/02/2022 24  0 - 40 U/L Final    Globulin 08/02/2022 3.2  2.3 - 3.5 g/dL Final    Ethanol Lvl 08/02/2022 <10  mg/dL Final    Ethanol percent 08/02/2022 Not indicated  G/dL Final    SARS-CoV-2, NAAT 08/02/2022 Not Detected  Not Detected Final    Comment: Rapid NAAT:   Negative results should be treated as presumptive and,  if inconsistent with clinical signs and symptoms or necessary for  patient management, should be tested with an alternative molecular  assay. Negative results do not preclude SARS-CoV-2 infection and  should not be used as the sole basis for patient management decisions. This test has been authorized by the FDA under an Emergency Use  Authorization (EUA) for use by authorized laboratories.     Fact sheet for Healthcare Providers:  Ruby  Fact sheet for Patients: Catarina.briana    METHODOLOGY: Isothermal Nucleic Acid Amplification      Color, UA 08/02/2022 Yellow  Straw/Yellow Final    Clarity, UA 08/02/2022 Clear  Clear Final    Glucose, Ur 08/02/2022 Negative  Negative mg/dL Final    Bilirubin Urine 08/02/2022 Negative  Negative Final    Ketones, Urine 08/02/2022 TRACE (A) Negative mg/dL Final    Specific Gravity, UA 08/02/2022 1.028  1.005 - 1.030 Final    Blood, Urine 08/02/2022 Negative  Negative Final    pH, UA 08/02/2022 7.0  5.0 - 9.0 Final    Protein, UA 08/02/2022 Negative  Negative mg/dL Final    Urobilinogen, Urine 08/02/2022 0.2  <2.0 E.U./dL Final    Nitrite, Urine 08/02/2022 Negative  Negative Final    Leukocyte Esterase, Urine 08/02/2022 Negative  Negative Final    Urine Reflex to Culture 08/02/2022 Not Indicated   Final    Amphetamine Screen, Urine 08/02/2022 Neg  Negative <1000 ng/mL Final    Barbiturate Screen, Ur 08/02/2022 Neg  Negative < 200 ng/mL Final    Benzodiazepine Screen, Urine 08/02/2022 Neg  Negative < 200 ng/mL Final    Cannabinoid Scrn, Ur 08/02/2022 Neg  Negative < 50 ng/mL Final    Cocaine Metabolite Screen, Urine 08/02/2022 Neg  Negative < 300 ng/mL Final    Opiate Scrn, Ur 08/02/2022 Neg  Negative < 300 ng/mL Final    PCP Screen, Urine 08/02/2022 Neg  Negative < 25 ng/mL Final    Methadone Screen, Urine 08/02/2022 Neg  Negative <300 ng/mL Final    Propoxyphene Scrn, Ur 08/02/2022 Neg  Negative <300 ng/mL Final    Oxycodone Urine 08/02/2022 Neg  Negative <100 ng/mL Final    FENTANYL SCREEN, URINE 08/02/2022 Neg  Negative < 50 ng/mL Final    Drug Screen Comment: 08/02/2022 see below   Final    Comment: This method is a screening test to detect only these drug  classes as part of a medical workup. Confirmatory testing  by another method should be ordered if clinically indicated.       TSH 08/02/2022 0.799  0.440 - 3.860 uIU/mL Final Salicylate, Serum 03/57/3576 <0.3 (A) 15.0 - 30.0 mg/dL Final    Comment: Anti-pyretic:  3.0-10.0 mg/dL  Anti-inflammatory:  15.0-30.0 mg/dL  Toxic: >30.0 mg/dL      Cholesterol, Total 08/02/2022 218 (A) 0 - 199 mg/dL Final    ATP III Cholesterol Classification is Borderline High. Triglycerides 08/02/2022 211 (A) 0 - 150 mg/dL Final    ATP III Triglycerides Classification is High. HDL 08/02/2022 40  40 - 59 mg/dL Final    Comment: ATP III HDL Cholestrol Classification is low. Expected Values:    Males:    >55 = No Risk            35-55 = Moderate Risk            <35 = High Risk    Females:  >65 = No Risk            45-65 = Moderate Risk            <45 = High Risk    NCEP Guidelines: Third Report May 2001  >59 = negative risk factor for CHD  <40 = major risk factor for CHD      LDL Calculated 08/02/2022 136 (A) 0 - 129 mg/dL Final    ATT III Classification is Borderline High. Ventricular Rate 08/02/2022 76  BPM Final    Atrial Rate 08/02/2022 76  BPM Final    P-R Interval 08/02/2022 134  ms Final    QRS Duration 08/02/2022 104  ms Final    Q-T Interval 08/02/2022 414  ms Final    QTc Calculation (Bazett) 08/02/2022 465  ms Final    P Axis 08/02/2022 45  degrees Final    R Axis 08/02/2022 -16  degrees Final    T Axis 08/02/2022 15  degrees Final    Sodium 08/03/2022 143  135 - 144 mEq/L Final    Potassium reflex Magnesium 08/03/2022 4.5  3.4 - 4.9 mEq/L Final    Chloride 08/03/2022 108 (A) 95 - 107 mEq/L Final    CO2 08/03/2022 25  20 - 31 mEq/L Final    Anion Gap 08/03/2022 10  9 - 15 mEq/L Final    Glucose 08/03/2022 89  70 - 99 mg/dL Final    BUN 08/03/2022 13  6 - 20 mg/dL Final    Creatinine 08/03/2022 0.60 (A) 0.70 - 1.20 mg/dL Final    GFR Non- 08/03/2022 >60.0  >60 Final    Comment: >60 mL/min/1.73m2 EGFR, calc. for ages 25 and older using the  MDRD formula (not corrected for weight), is valid for stable  renal function.       GFR  08/03/2022 >60.0  >60 Final Comment: >60 mL/min/1.73m2 EGFR, calc. for ages 25 and older using the  MDRD formula (not corrected for weight), is valid for stable  renal function.       Calcium 08/03/2022 8.7  8.5 - 9.9 mg/dL Final    Total Protein 08/03/2022 6.3  6.3 - 8.0 g/dL Final    Albumin 08/03/2022 3.9  3.5 - 4.6 g/dL Final    Total Bilirubin 08/03/2022 <0.2  0.2 - 0.7 mg/dL Final    Alkaline Phosphatase 08/03/2022 94  35 - 104 U/L Final    ALT 08/03/2022 15  0 - 41 U/L Final    AST 08/03/2022 17  0 - 40 U/L Final    Globulin 08/03/2022 2.4  2.3 - 3.5 g/dL Final           MEDICATIONS: Current Facility-Administered Medications: buprenorphine-naloxone (SUBOXONE) 8-2 MG SL tablet 2 tablet, 2 tablet, SubLINGual, Daily  finasteride (PROSCAR) tablet 1.25 mg, 1.25 mg, Oral, Daily  fluticasone (FLONASE) 50 MCG/ACT nasal spray 1 spray, 1 spray, Each Nostril, Nightly PRN  ketoconazole (NIZORAL) 2 % shampoo 1 Applicatorful, 1 Applicatorful, Topical, Daily PRN  cetirizine (ZYRTEC) tablet 10 mg, 10 mg, Oral, Daily  therapeutic multivitamin-minerals 1 tablet, 1 tablet, Oral, Nightly  OLANZapine (ZYPREXA) tablet 5 mg, 5 mg, Oral, Nightly  haloperidol lactate (HALDOL) injection 5 mg, 5 mg, IntraMUSCular, Q6H PRN **OR** haloperidol (HALDOL) tablet 5 mg, 5 mg, Oral, Q6H PRN  hydrOXYzine (VISTARIL) injection 50 mg, 50 mg, IntraMUSCular, Q6H PRN **OR** hydrOXYzine pamoate (VISTARIL) capsule 50 mg, 50 mg, Oral, Q6H PRN  thiamine tablet 100 mg, 100 mg, Oral, Daily  LORazepam (ATIVAN) tablet 1 mg, 1 mg, Oral, Q1H PRN **OR** LORazepam (ATIVAN) injection 1 mg, 1 mg, IntraMUSCular, Q1H PRN **OR** LORazepam (ATIVAN) tablet 2 mg, 2 mg, Oral, Q1H PRN **OR** LORazepam (ATIVAN) injection 2 mg, 2 mg, IntraMUSCular, Q1H PRN **OR** LORazepam (ATIVAN) tablet 3 mg, 3 mg, Oral, Q1H PRN **OR** LORazepam (ATIVAN) injection 3 mg, 3 mg, IntraMUSCular, Q1H PRN **OR** LORazepam (ATIVAN) tablet 4 mg, 4 mg, Oral, Q1H PRN **OR** LORazepam (ATIVAN) injection 4 mg, 4 mg, IntraMUSCular, Q1H PRN  nicotine polacrilex (COMMIT) lozenge 2 mg, 2 mg, Oral, Q1H PRN  acetaminophen (TYLENOL) tablet 650 mg, 650 mg, Oral, Q4H PRN  magnesium hydroxide (MILK OF MAGNESIA) 400 MG/5ML suspension 30 mL, 30 mL, Oral, Daily PRN  aluminum & magnesium hydroxide-simethicone (MAALOX) 200-200-20 MG/5ML suspension 30 mL, 30 mL, Oral, PRN  benztropine mesylate (COGENTIN) injection 2 mg, 2 mg, IntraMUSCular, BID PRN  traZODone (DESYREL) tablet 50 mg, 50 mg, Oral, Nightly PRN     ASSESSMENT:     Bipolar depression    PLAN: Patient admitted to [facility] general program for further evaluation, treatment and safety. Daily vitals, regular diet provided. Medication as ordered  PRN medications for agitation, anxiety and sleep are in place. Patient will be encouraged to participate in individual, group and milieu therapy. Patient will be discharged when clinically stable. Please note that case has been discussed with treatment team and notes have been reviewed.        Signed:  Kike Duckworth MD  8/3/2022  10:14 AM

## 2022-08-03 NOTE — PROGRESS NOTES
Pt took AM medications. Pt medicated with PRN Vistaril per [facility] for c/o anxiety. Pt requested Ativan with AM medications. Spoke with pt about ordered CIWA protocol as pt scored a 0 at 0900. Pt reports he does not drink alcohol and that he was getting Ativan from his doctor.

## 2022-08-03 NOTE — PROGRESS NOTES
Patient did not attend group despite staff encouragement.   Electronically signed by Delfina Siddiqui on 8/3/2022 at 3:27 PM

## 2022-08-03 NOTE — CONSULTS
on file   Social Connections: Not on file   Intimate Partner Violence: Not on file   Housing Stability: Not on file     MEDICATIONS:    Current Facility-Administered Medications   Medication Dose Route Frequency Provider Last Rate Last Admin    buprenorphine-naloxone (SUBOXONE) 8-2 MG SL tablet 2 tablet  2 tablet SubLINGual Daily John Pope MD   2 tablet at 08/03/22 0912    finasteride (PROSCAR) tablet 1.25 mg  1.25 mg Oral Daily John Pope MD   1.25 mg at 08/03/22 0911    fluticasone (FLONASE) 50 MCG/ACT nasal spray 1 spray  1 spray Each Nostril Nightly PRN John Pope MD        ketoconazole (NIZORAL) 2 % shampoo 1 Applicatorful  1 Applicatorful Topical Daily PRN John Pope MD        cetirizine (ZYRTEC) tablet 10 mg  10 mg Oral Daily John Pope MD   10 mg at 08/03/22 0912    therapeutic multivitamin-minerals 1 tablet  1 tablet Oral Nightly John Pope MD   1 tablet at 08/02/22 2111    OLANZapine (ZYPREXA) tablet 5 mg  5 mg Oral Nightly John Pope MD   5 mg at 08/02/22 2111    haloperidol lactate (HALDOL) injection 5 mg  5 mg IntraMUSCular Q6H PRN John Pope MD        Or    haloperidol (HALDOL) tablet 5 mg  5 mg Oral Q6H PRN John Pope MD        hydrOXYzine (VISTARIL) injection 50 mg  50 mg IntraMUSCular Q6H PRN John Pope MD        Or    hydrOXYzine pamoate (VISTARIL) capsule 50 mg  50 mg Oral Q6H PRN John Pope MD   50 mg at 08/03/22 0912    thiamine tablet 100 mg  100 mg Oral Daily John Pope MD   100 mg at 08/03/22 0931    LORazepam (ATIVAN) tablet 1 mg  1 mg Oral Q1H PRN John Pope MD        Or    LORazepam (ATIVAN) injection 1 mg  1 mg IntraMUSCular Q1H PRN John Pope MD        Or    LORazepam (ATIVAN) tablet 2 mg  2 mg Oral Q1H PRN John Pope MD        Or    LORazepam (ATIVAN) injection 2 mg  2 mg IntraMUSCular Q1H PRN John Pope MD        Or    LORazepam (ATIVAN) tablet 3 mg  3 mg Oral Q1H PRN Willean Kussmaul Jessee Padilla MD        Or    LORazepam (ATIVAN) injection 3 mg  3 mg IntraMUSCular Q1H PRN [facility], MD        Or    LORazepam (ATIVAN) tablet 4 mg  [facility]        Or    LORazepam (ATIVAN) injection 4 mg  4 mg IntraMUSCular Q1H [facility]        nicotine polacrilex (COMMIT) lozenge 2 mg  2 mg Oral Q1H PRN [facility], MD        acetaminophen (TYLENOL) tablet 650 mg  650 mg Oral Q4H PRN [facility], MD        magnesium hydroxide (MILK OF MAGNESIA) 400 MG/5ML suspension 30 mL  30 mL Oral Daily PRN [facility], MD        aluminum & magnesium hydroxide-simethicone (MAALOX) 200-200-20 MG/5ML suspension 30 mL  30 mL Oral PRN [facility], MD        benztropine mesylate (COGENTIN) injection 2 mg  2 mg IntraMUSCular BID PRN [facility], MD        traZODone (DESYREL) tablet 50 mg  50 mg Oral Nightly [facility]           ALLERGIES: Seasonal    REVIEW OF SYSTEM:   ROS as noted in HPI, 12 point ROS reviewed and otherwise negative. OBJECTIVE  PHYSICAL EXAM: /67   Pulse 70   Temp 97.7 °F (36.5 °C) (Oral)   Resp 18   Ht 5' 8\" (1.727 m)   Wt 185 lb (83.9 kg)   SpO2 96%   BMI 28.13 kg/m²   CONSTITUTIONAL:  awake, alert, cooperative, no apparent distress, and appears stated age  EYES:  Lids and lashes normal, conjunctiva normal  ENT:  Normocephalic, without obvious abnormality, atraumatic, sinuses nontender on palpation, external ears without lesions, oral pharynx with moist mucus membranes, tonsils without erythema or exudates, gums normal and good dentition. NECK:  Supple, symmetrical, trachea midline  LUNGS:  Clear to auscultation bilaterally, no crackles or wheezing  CARDIOVASCULAR:  Regular rate and rhythm, normal S1 and S2  ABDOMEN:  Normal bowel sounds, soft, non-distended, non-tender, no masses palpated, no hepatosplenomegally  MUSCULOSKELETAL:  There is no redness, warmth, or swelling of the joints.     NEUROLOGIC: Awake, alert, oriented to name, place and time. SKIN:  warm and dry    DATA:     Diagnostic tests reviewed for today's visit:    Most recent labs and imaging results reviewed. ASSESSMENT AND PLAN    Schizoaffective disorder, depressive type [facility])  Patient admitted to behavorial health for evaluation and treatment     This is only a history and physical examination and not medical management. The patient is to contact and follow up with their primary care physician and go over any abnormal labs, imaging, findings, medical concerns, or conditions that we have and have not addressed during this encounter.     Plan of care discussed with: patient    SIGNATURE: JOSSELIN Poole NP  DATE: August 3, 2022  TIME: 9:34 AM

## 2022-08-03 NOTE — CARE COORDINATION
Psychosocial Assessment    Current Level of Psychosocial Functioning     Independent X  Dependent    Minimal Assist     Comments:  resides with mom. Moved from [facility] years ago. Not linked with outpatient services. Psychosocial High Risk Factors (check all that apply)    Unable to obtain meds   Chronic illness/pain    Substance abuse   Lack of Family Support   Financial stress  x   Isolation   Inadequate Community Resources X   Suicide attempt(s)  Not taking medications  x   Victim of crime   Developmental Delay  Unable to manage personal needs    Age 72 or older   Homeless  No transportation   Readmission within 30 days x   Unemployment  Traumatic Event    Family/Supports identified: momsandy, [phone]    Sexual Orientation:  did not identify    Patient Strengths: housing, support from mom    Patient Barriers:  med compliance, limited insight     Safety plan: needs completed, pt did not want to complete at time of assessment. CMHC/MH history: past admissions, recent admission at [address] [place] [address]. Linked with [facility]. Plan of Care:  medication management, group/individual therapies, family meetings, psycho -education, treatment team meetings to assist with stabilization    Initial Discharge Plan:  gather collateral and discuss with tx team.     Clinical Summary:  pt is a 44year old male who presented to ER following his mother calling the police after pt verbalized SI. Reports pt has not been sleeping for 4 days. Recently discharged from [address] [place] [address]. Not compliant with all meds since his discharge. Denies SI/HI/AVH. Excessive tiredness but pt was taking stimulant like medication purchased from the internet prior to admission. Didn't sleep for 4 days. Pt reports being linked with [facility].  Electronically signed by MARK Story on 8/3/2022 at 3:11 PM

## 2022-08-04 PROCEDURE — 6360000002 HC RX W HCPCS: Performed by: PSYCHIATRY & NEUROLOGY

## 2022-08-04 PROCEDURE — 6370000000 HC RX 637 (ALT 250 FOR IP): Performed by: PSYCHIATRY & NEUROLOGY

## 2022-08-04 PROCEDURE — 1240000000 HC EMOTIONAL WELLNESS R&B

## 2022-08-04 PROCEDURE — 99233 SBSQ HOSP IP/OBS HIGH 50: CPT | Performed by: PSYCHIATRY & NEUROLOGY

## 2022-08-04 RX ADMIN — Medication 1 TABLET: at 21:10

## 2022-08-04 RX ADMIN — DIVALPROEX SODIUM 250 MG: 250 TABLET, DELAYED RELEASE ORAL at 21:10

## 2022-08-04 RX ADMIN — BUPRENORPHINE HYDROCHLORIDE AND NALOXONE HYDROCHLORIDE DIHYDRATE 2 TABLET: 8; 2 TABLET SUBLINGUAL at 08:45

## 2022-08-04 RX ADMIN — Medication 100 MG: at 08:45

## 2022-08-04 RX ADMIN — QUETIAPINE 50 MG: 50 TABLET ORAL at 21:11

## 2022-08-04 RX ADMIN — FINASTERIDE 1.25 MG: 5 TABLET, FILM COATED ORAL at 08:48

## 2022-08-04 RX ADMIN — DIVALPROEX SODIUM 250 MG: 250 TABLET, DELAYED RELEASE ORAL at 06:06

## 2022-08-04 RX ADMIN — DIVALPROEX SODIUM 250 MG: 250 TABLET, DELAYED RELEASE ORAL at 13:37

## 2022-08-04 RX ADMIN — CETIRIZINE HYDROCHLORIDE 10 MG: 10 TABLET, FILM COATED ORAL at 08:45

## 2022-08-04 NOTE — PROGRESS NOTES
Pt did not attend group despite staff encouragement to do so.   Electronically signed by Mariaelena Simms on 8/4/2022 at 3:13 PM

## 2022-08-04 NOTE — PROGRESS NOTES
Pt. refused to attend the 1000 skills group, despite staff encouragement.   Electronically signed by Prabha Mcgovern on 8/4/2022 at 12:37 PM

## 2022-08-04 NOTE — PROGRESS NOTES
[facility]. FOLLOW-UP NOTE       8/4/2022     Patient was seen and examined in person, Chart reviewed   Patient's case discussed with staff/team    Chief Complaint: depression, paranoid    Interim History:     Pt report feeling the same with mood symptoms  Denies  or   Pt see psychiatrist at [facility]  Pt has been tolerating medication  Sleep was disturbed last night  Hopeless feeling  Mom wanted him to be moved to [facility] from where he got recently discharged  Pt did not express any wish to move to [facility]  Appetite:   [x] Normal/Unchanged  [] Increased  [] Decreased      Sleep:       [] Normal/Unchanged  [x] Fair       [x] Poor              Energy:    [] Normal/Unchanged  [] Increased  [x] Decreased        SI [] Present  [] Absent    HI  []Present  [] Absent     Aggression:  [] yes  [] no    Patient is [] able  [x] unable to CONTRACT FOR SAFETY     PAST MEDICAL/PSYCHIATRIC HISTORY:   History reviewed. No pertinent past medical history. FAMILY/SOCIAL HISTORY:  History reviewed. No pertinent family history.   Social History     Socioeconomic History    Marital status: Single     Spouse name: Not on file    Number of children: Not on file    Years of education: Not on file    Highest education level: Not on file   Occupational History    Not on file   Tobacco Use    Smoking status: Every Day     Packs/day: 1.00     Types: Cigarettes    Smokeless tobacco: Never   Vaping Use    Vaping Use: Never used   Substance and Sexual Activity    Alcohol use: Yes     Comment: socially    Drug use: Yes     Types: Opiates , Methamphetamines (Crystal Meth), Marijuana (Arlis Blunt)     Comment: \"poppyseed tea\"    Sexual activity: Not on file   Other Topics Concern    Not on file   Social History Narrative    Not on file     Social Determinants of Health     Financial Resource Strain: Not on file   Food Insecurity: Not on file   Transportation Needs: Not on file   Physical Activity: Not on file Stress: Not on file   Social Connections: Not on file   Intimate Partner Violence: Not on file   Housing Stability: Not on file           ROS:  [x] All negative/unchanged except if checked.  Explain positive(checked items) below:  [] Constitutional  [] Eyes  [] Ear/Nose/Mouth/Throat  [] Respiratory  [] CV  [] GI  []   [] Musculoskeletal  [] Skin/Breast  [] Neurological  [] Endocrine  [] Heme/Lymph  [] Allergic/Immunologic    Explanation:     MEDICATIONS:    Current Facility-Administered Medications:     divalproex (DEPAKOTE) DR tablet 250 mg, 250 mg, Oral, 3 times per day, Douglas Blanco MD, 250 mg at 08/04/22 1337    QUEtiapine (SEROQUEL) tablet 50 mg, 50 mg, Oral, Nightly, Douglas Blanco MD, 50 mg at 08/03/22 2133    buprenorphine-naloxone (SUBOXONE) 8-2 MG SL tablet 2 tablet, 2 tablet, SubLINGual, Daily, Douglas Blanco MD, 2 tablet at 08/04/22 0845    finasteride (PROSCAR) tablet 1.25 mg, 1.25 mg, Oral, Daily, Douglas Blanco MD, 1.25 mg at 08/04/22 0848    fluticasone (FLONASE) 50 MCG/ACT nasal spray 1 spray, 1 spray, Each Nostril, Nightly PRN, Douglas Blanco MD    ketoconazole (NIZORAL) 2 % shampoo 1 Applicatorful, 1 Applicatorful, Topical, Daily PRN, Douglas Blanco MD    cetirizine (ZYRTEC) tablet 10 mg, 10 mg, Oral, Daily, Douglas Blanco MD, 10 mg at 08/04/22 0845    therapeutic multivitamin-minerals 1 tablet, 1 tablet, Oral, Nightly, Douglas Blanco MD, 1 tablet at 08/03/22 2133    haloperidol lactate (HALDOL) injection 5 mg, 5 mg, IntraMUSCular, Q6H PRN **OR** haloperidol (HALDOL) tablet 5 mg, 5 mg, Oral, Q6H PRN, Douglas Blanco MD    hydrOXYzine (VISTARIL) injection 50 mg, 50 mg, IntraMUSCular, Q6H PRN **OR** hydrOXYzine pamoate (VISTARIL) capsule 50 mg, 50 mg, Oral, Q6H PRN, Douglas Blanco MD, 50 mg at 08/03/22 0912    thiamine tablet 100 mg, 100 mg, Oral, Daily, Douglas Blanco MD, 100 mg at 08/04/22 0845    LORazepam (ATIVAN) tablet 1 mg, 1 mg, Oral, Q1H PRN **OR** LORazepam (ATIVAN) injection 1 mg, 1 mg, IntraMUSCular, Q1H PRN **OR** LORazepam (ATIVAN) tablet 2 mg, 2 mg, Oral, Q1H PRN **OR** LORazepam (ATIVAN) injection 2 mg, 2 mg, IntraMUSCular, Q1H PRN **OR** LORazepam (ATIVAN) tablet 3 mg, 3 mg, Oral, Q1H PRN **OR** LORazepam (ATIVAN) injection 3 mg, 3 mg, IntraMUSCular, Q1H PRN **OR** LORazepam (ATIVAN) tablet 4 mg, 4 mg, Oral, Q1H PRN **OR** LORazepam (ATIVAN) injection 4 mg, 4 mg, IntraMUSCular, Q1H PRN, Smith Perez MD    nicotine polacrilex (COMMIT) lozenge 2 mg, 2 mg, Oral, Q1H PRN, Smith Perze MD    acetaminophen (TYLENOL) tablet 650 mg, 650 mg, Oral, Q4H PRN, Smith Perez MD    magnesium hydroxide (MILK OF MAGNESIA) 400 MG/5ML suspension 30 mL, 30 mL, Oral, Daily PRN, Smith Perez MD    aluminum & magnesium hydroxide-simethicone (MAALOX) 200-200-20 MG/5ML suspension 30 mL, 30 mL, Oral, PRN, Smith Perez MD    benztropine mesylate (COGENTIN) injection 2 mg, 2 mg, IntraMUSCular, BID PRN, Smith Perez MD    traZODone (DESYREL) tablet 50 mg, 50 mg, Oral, Nightly PRN, Smith Perez MD      Examination:  /70   Pulse 66   Temp 97.8 °F (36.6 °C) (Oral)   Resp 18   Ht 5' 8\" (1.727 m)   Wt 185 lb (83.9 kg)   SpO2 96%   BMI 28.13 kg/m²   Gait - steady  Medication side effects(SE): no    Mental Status Examination:    Level of consciousness:  within normal limits   Appearance:  poor grooming and poor hygiene  Behavior/Motor:  psychomotor retardation  Attitude toward examiner:  attentive  Speech:  slow   Mood: dysthymic  Affect:  anxious  Thought processes:  slow   Thought content:  Preoccupied with hopeless feeling  Cognition:  oriented to person, place, and time   Concentration poor  Insight poor   Judgement poor     ASSESSMENT:   Patient symptoms are:  [] Well controlled  [] Improving  [] Worsening  [] No change      Diagnosis:   Principal Problem:    Bipolar affective disorder, depressed, severe, with psychotic behavior [facility])  Resolved Problems:    * No resolved hospital problems. *      LABS:    Recent Labs     08/02/22  1155   WBC 10.4   HGB 13.5*        Recent Labs     08/02/22  1155 08/03/22  0631    143   K 4.4 4.5    108*   CO2 24 25   BUN 15 13   CREATININE 0.62* 0.60*   GLUCOSE 96 89     Recent Labs     08/02/22  1155 08/03/22  0631   BILITOT <0.2 <0.2   ALKPHOS 120* 94   AST 24 17   ALT 22 15     Lab Results   Component Value Date/Time    LABAMPH Neg 08/02/2022 11:45 AM    BARBSCNU Neg 08/02/2022 11:45 AM    LABBENZ Neg 08/02/2022 11:45 AM    LABMETH Neg 08/02/2022 11:45 AM    OPIATESCREENURINE Neg 08/02/2022 11:45 AM    PHENCYCLIDINESCREENURINE Neg 08/02/2022 11:45 AM    ETOH <10 08/02/2022 11:56 AM     Lab Results   Component Value Date/Time    TSH 0.799 08/02/2022 11:55 AM     No results found for: LITHIUM  No results found for: VALPROATE, CBMZ      Treatment Plan:  Reviewed current Medications with the patient. Medication as ordered  Risks, benefits, side effects, drug-to-drug interactions and alternatives to treatment were discussed. Collateral information:   CD evaluation  Encourage patient to attend group and other milieu activities.   Discharge planning discussed with the patient and treatment team.    PSYCHOTHERAPY/COUNSELING:  [x] Therapeutic interview  [x] Supportive  [] CBT  [] Ongoing  [] Other    [x] Patient continues to need, on a daily basis, active treatment furnished directly by or requiring the supervision of inpatient psychiatric personnel      Anticipated Length of stay:            Electronically signed by Jorge Alford MD on 8/4/2022 at 4:55 PM

## 2022-08-04 NOTE — PROGRESS NOTES
Pt goes back to bed after eating breakfast with his suboxone which he takes one at a time, equalling 2 siblingual.  Patient polite and cooperative with care. Patient states he feels that he is adjusting to the medication. Pt has a hard time keeping his eyes open. Vitals signs are WNL. Pt affect is flat subdued. Patient has poor eye contact and at this time would like to rest.  Patient denies current SI/HI or AVH. Pt anx and dep 5/10. Pt continues on the CIWA not scoring for prn medications. Patients lights are turned off and patient will be monitored.

## 2022-08-04 NOTE — PROGRESS NOTES
Pt. declined to attend the 0900 community meeting, despite staff encouragement.   GOAL : \" to be more active on the unit\" Electronically signed by Mick Moyer on 8/4/2022 at 11:53 AM

## 2022-08-04 NOTE — PROGRESS NOTES
Patient continues on CIWA protocol not scoring for prns. Patient remains in bed sleeping, stating that he is catching up on his sleep. Patients vitals remain WNL.  Electronically signed by Casimiro Davis LPN on 1/6/0706 at 4:70 PM

## 2022-08-04 NOTE — FLOWSHEET NOTE
Pt has been in his room sleeping most of the afternoon and did come out for dinner. Pt endorses poor sleep last night and endorses feeling hopeless. Pt has been polite and cooperative wit poor eye contact. Pt did not attend group despite encouragement. Pt denies SI,HI,AVH.

## 2022-08-05 PROCEDURE — 1240000000 HC EMOTIONAL WELLNESS R&B

## 2022-08-05 PROCEDURE — 6360000002 HC RX W HCPCS: Performed by: PSYCHIATRY & NEUROLOGY

## 2022-08-05 PROCEDURE — 99232 SBSQ HOSP IP/OBS MODERATE 35: CPT | Performed by: PSYCHIATRY & NEUROLOGY

## 2022-08-05 PROCEDURE — 6370000000 HC RX 637 (ALT 250 FOR IP): Performed by: PSYCHIATRY & NEUROLOGY

## 2022-08-05 RX ADMIN — DIVALPROEX SODIUM 250 MG: 250 TABLET, DELAYED RELEASE ORAL at 21:30

## 2022-08-05 RX ADMIN — DIVALPROEX SODIUM 250 MG: 250 TABLET, DELAYED RELEASE ORAL at 06:27

## 2022-08-05 RX ADMIN — QUETIAPINE 50 MG: 50 TABLET ORAL at 20:54

## 2022-08-05 RX ADMIN — CETIRIZINE HYDROCHLORIDE 10 MG: 10 TABLET, FILM COATED ORAL at 08:59

## 2022-08-05 RX ADMIN — Medication 100 MG: at 08:59

## 2022-08-05 RX ADMIN — KETOCONAZOLE 1 APPLICATORFUL: 20 SHAMPOO, SUSPENSION TOPICAL at 09:21

## 2022-08-05 RX ADMIN — DIVALPROEX SODIUM 250 MG: 250 TABLET, DELAYED RELEASE ORAL at 13:57

## 2022-08-05 RX ADMIN — Medication 1 TABLET: at 20:54

## 2022-08-05 RX ADMIN — BUPRENORPHINE HYDROCHLORIDE AND NALOXONE HYDROCHLORIDE DIHYDRATE 2 TABLET: 8; 2 TABLET SUBLINGUAL at 08:59

## 2022-08-05 RX ADMIN — FINASTERIDE 1.25 MG: 5 TABLET, FILM COATED ORAL at 08:59

## 2022-08-05 NOTE — PROGRESS NOTES
Pt minimally social with staff and peers, pt attending groups with much encouragement. Poor eye contact. Pt reports he has no support besides his mom, and her hospice team. Pt denies [address], pt reports he is starting to feel more stable, states he realizes self medicating was not the answer.

## 2022-08-05 NOTE — PROGRESS NOTES
Patient comes out for meals and isolates to room. Patient must be encouraged to get up and go to groups and take a shower. Patient is directable, but if can pt will go back and lay in bed. Patient must be watched and encouraged to come out of room. Patient denies AVH. Patient does not feel that he is sleeping good. Patient encouraged to stay out of room during the day and then may get a better sleep at night. Patients eye contact is poor. Patient is polite and cooperative with care and medications. Thought process is slow. Patient does not appear anxious. Patient is seen in psychotherapy group and art therapy this am with encouragement. Patient is very hard to engage and answers are one to two words. Pt denies SI/HI. Patient dep and anx are still 5/10. Patient is encouraged to participate in care and if he stays in bed this will not help. Electronically signed by Amador Wynn LPN on 6/6/2741 at 02:26 AM    Patient does take a shower after lunch with much encouragement. Pt uses the Nizoral 2% shampoo.  Electronically signed by Amador Wynn LPN on 3/4/0419 at 2:99 PM

## 2022-08-05 NOTE — PROGRESS NOTES
[facility]. FOLLOW-UP NOTE       8/5/2022     Patient was seen and examined in person, Chart reviewed   Patient's case discussed with staff/team    Chief Complaint: depression, paranoid    Interim History:     Pt report feeling better  Less depressed  Less racing thoughts  Feel the medication is helping him - better than zyprexa that he was taking  Pt denies any AH or VH  No paranid thoughts  Appetite:   [x] Normal/Unchanged  [] Increased  [] Decreased      Sleep:       [] Normal/Unchanged  [x] Fair       [x] Poor              Energy:    [] Normal/Unchanged  [] Increased  [x] Decreased        SI [] Present  [] Absent    HI  []Present  [] Absent     Aggression:  [] yes  [] no    Patient is [] able  [x] unable to CONTRACT FOR SAFETY     PAST MEDICAL/PSYCHIATRIC HISTORY:   History reviewed. No pertinent past medical history. FAMILY/SOCIAL HISTORY:  History reviewed. No pertinent family history.   Social History     Socioeconomic History    Marital status: Single     Spouse name: Not on file    Number of children: Not on file    Years of education: Not on file    Highest education level: Not on file   Occupational History    Not on file   Tobacco Use    Smoking status: Every Day     Packs/day: 1.00     Types: Cigarettes    Smokeless tobacco: Never   Vaping Use    Vaping Use: Never used   Substance and Sexual Activity    Alcohol use: Yes     Comment: socially    Drug use: Yes     Types: Opiates , Methamphetamines (Crystal Meth), Marijuana (Weed)     Comment: \"poppyseed tea\"    Sexual activity: Not on file   Other Topics Concern    Not on file   Social History Narrative    Not on file     Social Determinants of Health     Financial Resource Strain: Not on file   Food Insecurity: Not on file   Transportation Needs: Not on file   Physical Activity: Not on file   Stress: Not on file   Social Connections: Not on file   Intimate Partner Violence: Not on file   Housing Stability: Not on file           ROS:  [x] All negative/unchanged except if checked.  Explain positive(checked items) below:  [] Constitutional  [] Eyes  [] Ear/Nose/Mouth/Throat  [] Respiratory  [] CV  [] GI  []   [] Musculoskeletal  [] Skin/Breast  [] Neurological  [] Endocrine  [] Heme/Lymph  [] Allergic/Immunologic    Explanation:     MEDICATIONS:    Current Facility-Administered Medications:     divalproex (DEPAKOTE) DR tablet 250 mg, 250 mg, Oral, 3 times per day, Juancho Moreno MD, 250 mg at 08/05/22 1357    QUEtiapine (SEROQUEL) tablet 50 mg, 50 mg, Oral, Nightly, Juancho Moreno MD, 50 mg at 08/04/22 2111    buprenorphine-naloxone (SUBOXONE) 8-2 MG SL tablet 2 tablet, 2 tablet, SubLINGual, Daily, Juancho Moreno MD, 2 tablet at 08/05/22 0859    finasteride (PROSCAR) tablet 1.25 mg, 1.25 mg, Oral, Daily, Juancho Moreno MD, 1.25 mg at 08/05/22 0859    fluticasone (FLONASE) 50 MCG/ACT nasal spray 1 spray, 1 spray, Each Nostril, Nightly PRN, Juancho Moreno MD    ketoconazole (NIZORAL) 2 % shampoo 1 Applicatorful, 1 Applicatorful, Topical, Daily PRN, Juancho Moreno MD, 1 Applicatorful at 89/19/48 0921    cetirizine (ZYRTEC) tablet 10 mg, 10 mg, Oral, Daily, Juancho Moreno MD, 10 mg at 08/05/22 0859    therapeutic multivitamin-minerals 1 tablet, 1 tablet, Oral, Nightly, Juancho Moreno MD, 1 tablet at 08/04/22 2110    haloperidol lactate (HALDOL) injection 5 mg, 5 mg, IntraMUSCular, Q6H PRN **OR** haloperidol (HALDOL) tablet 5 mg, 5 mg, Oral, Q6H PRN, Juancho Moreno MD    hydrOXYzine (VISTARIL) injection 50 mg, 50 mg, IntraMUSCular, Q6H PRN **OR** hydrOXYzine pamoate (VISTARIL) capsule 50 mg, 50 mg, Oral, Q6H PRN, Juancho Moreno MD, 50 mg at 08/03/22 0912    thiamine tablet 100 mg, 100 mg, Oral, Daily, Juancho Moreno MD, 100 mg at 08/05/22 0859    LORazepam (ATIVAN) tablet 1 mg, 1 mg, Oral, Q1H PRN **OR** LORazepam (ATIVAN) injection 1 mg, 1 mg, IntraMUSCular, Q1H PRN **OR** LORazepam (ATIVAN) tablet 2 mg, 2 mg, Oral, Q1H PRN **OR** LORazepam (ATIVAN) injection 2 mg, 2 mg, IntraMUSCular, Q1H PRN **OR** LORazepam (ATIVAN) tablet 3 mg, 3 mg, Oral, Q1H PRN **OR** LORazepam (ATIVAN) injection 3 mg, 3 mg, IntraMUSCular, Q1H PRN **OR** LORazepam (ATIVAN) tablet 4 mg, 4 mg, Oral, Q1H PRN **OR** LORazepam (ATIVAN) injection 4 mg, 4 mg, IntraMUSCular, Q1H PRN, Cristopher MD Carlos Alberto    nicotine polacrilex (COMMIT) lozenge 2 mg, 2 mg, Oral, Q1H PRN, Cristopher Carcamo MD    acetaminophen (TYLENOL) tablet 650 mg, 650 mg, Oral, Q4H PRN, Cristopher Carcamo MD    magnesium hydroxide (MILK OF MAGNESIA) 400 MG/5ML suspension 30 mL, 30 mL, Oral, Daily PRN, Cristopher Carcamo MD    aluminum & magnesium hydroxide-simethicone (MAALOX) 200-200-20 MG/5ML suspension 30 mL, 30 mL, Oral, PRN, Cristopher MD Carlos Alberto    benztropine mesylate (COGENTIN) injection 2 mg, 2 mg, IntraMUSCular, BID PRN, Cristopher Carcamo MD    traZODone (DESYREL) tablet 50 mg, 50 mg, Oral, Nightly PRN, Cristopher Carcamo MD      Examination:  /77   Pulse 58   Temp 98.1 °F (36.7 °C) (Oral)   Resp 18   Ht 5' 8\" (1.727 m)   Wt 185 lb (83.9 kg)   SpO2 96%   BMI 28.13 kg/m²   Gait - steady  Medication side effects(SE): no    Mental Status Examination:    Level of consciousness:  within normal limits   Appearance:  poor grooming and poor hygiene  Behavior/Motor:  psychomotor retardation  Attitude toward examiner:  attentive  Speech:  slow   Mood: dysthymic  Affect:  anxious  Thought processes:  slow   Thought content:  Preoccupied with hopeless feeling  Cognition:  oriented to person, place, and time   Concentration poor  Insight poor   Judgement poor     ASSESSMENT:   Patient symptoms are:  [] Well controlled  [] Improving  [] Worsening  [] No change      Diagnosis:   Principal Problem:    Bipolar affective disorder, depressed, severe, with psychotic behavior ([address]  Resolved Problems:    * No resolved hospital problems.  *      LABS:    No results for input(s): WBC, HGB, PLT in the last 72 hours. Recent Labs     08/03/22  0631      K 4.5   *   CO2 25   BUN 13   CREATININE 0.60*   GLUCOSE 89     Recent Labs     08/03/22  0631   BILITOT <0.2   ALKPHOS 94   AST 17   ALT 15     Lab Results   Component Value Date/Time    LABAMPH Neg 08/02/2022 11:45 AM    BARBSCNU Neg 08/02/2022 11:45 AM    LABBENZ Neg 08/02/2022 11:45 AM    LABMETH Neg 08/02/2022 11:45 AM    OPIATESCREENURINE Neg 08/02/2022 11:45 AM    PHENCYCLIDINESCREENURINE Neg 08/02/2022 11:45 AM    ETOH <10 08/02/2022 11:56 AM     Lab Results   Component Value Date/Time    TSH 0.799 08/02/2022 11:55 AM     No results found for: LITHIUM  No results found for: VALPROATE, CBMZ      Treatment Plan:  Reviewed current Medications with the patient. Medication as ordered  Risks, benefits, side effects, drug-to-drug interactions and alternatives to treatment were discussed. Collateral information:   CD evaluation  Encourage patient to attend group and other milieu activities.   Discharge planning discussed with the patient and treatment team.    PSYCHOTHERAPY/COUNSELING:  [x] Therapeutic interview  [x] Supportive  [] CBT  [] Ongoing  [] Other    [x] Patient continues to need, on a daily basis, active treatment furnished directly by or requiring the supervision of inpatient psychiatric personnel      Anticipated Length of stay:            Electronically signed by Asif Rosado MD on 8/5/2022 at 2:26 PM

## 2022-08-05 NOTE — GROUP NOTE
Group Therapy Note    Date: 8/5/2022    Group Start Time: 1000  Group End Time: 0362  Group Topic: Activity    [facility] COLTON Sepulveda        Group Therapy Note    Attendees: 13       Patient's Goal:  \"To feel better and to go to the groups. \"    Notes:  Pt. attended the 10:00 AM group. Pt. was quiet and did not interact with others.     Status After Intervention:  Improved    Participation Level: Minimal    Participation Quality: Appropriate      Speech:  Quiet      Thought Process/Content: Logical      Affective Functioning: Congruent      Mood:  Calm and Withdrawn      Level of consciousness:  Alert and Oriented x4      Response to Learning: Progressing to goal      Endings: None Reported    Modes of Intervention: Activity      Discipline Responsible: [address], [employer]      Signature:  Bravo Sepulveda

## 2022-08-05 NOTE — GROUP NOTE
Group Therapy Note    Date: 8/5/2022    Group Start Time: 5109  Group End Time: 1350  Group Topic: Healthy Living/Wellness    [facility]    Juan Miguel Ruiz        Group Therapy Note    Attendees: 5/12         Patient's Goal:  To participate in an activity, socializing, and sharing with peers. Notes:  Pt. actively participated. Status After Intervention:  Improved    Participation Level:  Active Listener and Interactive    Participation Quality: Appropriate and Attentive      Speech:  Quiet      Thought Process/Content: Logical      Affective Functioning: Blunted      Mood:  Slightly anxious      Level of consciousness:  Alert and Attentive      Response to Learning: Progressing to goal      Endings: None Reported    Modes of Intervention: Socialization      Discipline Responsible: Behavorial Health Tech      Signature:  Juan Miguel Ruiz

## 2022-08-05 NOTE — GROUP NOTE
Group Therapy Note    Date: 8/5/2022    Group Start Time: 1100  Group End Time: 36  Group Topic: Psychotherapy    [facility]    Rosalva Hart, [facility]        Group Therapy Note    Attendees: 9       Patient's Goal:  learn coping skills    Notes:  patient participated    Status After Intervention:  Improved    Participation Level: Interactive    Participation Quality: Appropriate      Speech:  normal      Thought Process/Content: Logical      Affective Functioning: Congruent      Mood: anxious      Level of consciousness:  Alert      Response to Learning: Progressing to goal      Endings: None Reported    Modes of Intervention: Support      Discipline Responsible: /Counselor      Signature:  Alisha Oconnor, [facility]

## 2022-08-05 NOTE — PROGRESS NOTES
Pt. declined to attend the 0900 community meeting, despite staff encouragement.  Goal - \"To feel better and to go to the groups\" Electronically signed by KIRT Silva on 8/5/2022 at 9:50 AM

## 2022-08-05 NOTE — GROUP NOTE
Group Therapy Note    Date: 8/5/2022    Group Start Time: 1600  Group End Time: 4481  Group Topic: Healthy Living/Wellness    [facility]    Alberto Srivastava        Group Therapy Note    Attendees: 8/12       Patient's Goal:  To play a Jeopardy game related to self-esteem. Notes:  Pt. attended 1600 group meeting. Pt. was quiet and was minimally engaged in activity. Status After Intervention:  Unchanged    Participation Level:  Active Listener and Minimal    Participation Quality: Appropriate      Speech:  Quiet      Thought Process/Content: Logical      Affective Functioning: Blunted      Mood:  Calm      Level of consciousness:  Alert      Response to Learning: Progressing to goal      Endings: None Reported    Modes of Intervention: Activity      Discipline Responsible: [address], [employer]      Signature:  Alberto Srivastava

## 2022-08-06 PROCEDURE — 1240000000 HC EMOTIONAL WELLNESS R&B

## 2022-08-06 PROCEDURE — 6370000000 HC RX 637 (ALT 250 FOR IP): Performed by: PSYCHIATRY & NEUROLOGY

## 2022-08-06 PROCEDURE — 6360000002 HC RX W HCPCS: Performed by: PSYCHIATRY & NEUROLOGY

## 2022-08-06 RX ADMIN — Medication 100 MG: at 08:59

## 2022-08-06 RX ADMIN — DIVALPROEX SODIUM 250 MG: 250 TABLET, DELAYED RELEASE ORAL at 06:20

## 2022-08-06 RX ADMIN — FINASTERIDE 1.25 MG: 5 TABLET, FILM COATED ORAL at 08:59

## 2022-08-06 RX ADMIN — CETIRIZINE HYDROCHLORIDE 10 MG: 10 TABLET, FILM COATED ORAL at 08:59

## 2022-08-06 RX ADMIN — QUETIAPINE 50 MG: 50 TABLET ORAL at 21:15

## 2022-08-06 RX ADMIN — Medication 1 TABLET: at 21:15

## 2022-08-06 RX ADMIN — BUPRENORPHINE HYDROCHLORIDE AND NALOXONE HYDROCHLORIDE DIHYDRATE 2 TABLET: 8; 2 TABLET SUBLINGUAL at 08:58

## 2022-08-06 RX ADMIN — DIVALPROEX SODIUM 250 MG: 250 TABLET, DELAYED RELEASE ORAL at 13:48

## 2022-08-06 RX ADMIN — DIVALPROEX SODIUM 250 MG: 250 TABLET, DELAYED RELEASE ORAL at 21:19

## 2022-08-06 NOTE — PROGRESS NOTES
Patient is in room when discussing with him about how he is feeling. Patient still c/o being tired and is in room as much as he can be laying in bed. Patient needs a lot of encouragement to get up and attend groups. Patient does come out by self to eat meals. Patient talks about mother being on Hospice and not doing so well. Patient states that she does have someone helping her while he is in the hospital.  Patient states that they lease a place to live together. Patient chuckles when this nurse asks if he has been thing about the future and how he will do. Patient has poor eye contact and states that he has not really thought that much. Patient denies SI/HI or AVH. Patient flat sad in appearance, blunt affect. Patient answers questions yes and no and needs a lot of prodding to discuss with this nurse. Patient will be monitored and encouraged to participate in care. Patient did show this nurse a paper from the group yesterday, and his favority quote on the paper was \"dont worry about tomorrow and dont ruminate about yesterday but stay in the now and then. \"Electronically signed by Amador Wynn LPN on 0/1/1787 at 49:30 AM

## 2022-08-06 NOTE — GROUP NOTE
Group Therapy Note    Date: 8/6/2022    Group Start Time: 1000  Group End Time: 1050  Group Topic: Psychoeducation    [facility] COLTON Bergman        Group Therapy Note    Attendees: 7       Patient's Goal:   \"To attend all the groups\"    Notes:  Patient attended the 1000 skills group. Patient was quiet and kept to himself. He worked fairly well on his task.     Status After Intervention:  Unchanged    Participation Level: Fairly well    Participation Quality: Appropriate      Speech:  quiet      Thought Process/Content: Linear      Affective Functioning: Congruent      Mood:  calm      Level of consciousness:  Alert      Response to Learning: Progressing to goal      Endings: None Reported    Modes of Intervention: Education, Socialization, and Activity      Discipline Responsible: Psychoeducational Specialist      Signature:  Melania Bergman

## 2022-08-06 NOTE — PROGRESS NOTES
Pt. declined to attend the 0900 community meeting, despite staff encouragement.  Goal - \"To attend all the group\" Electronically signed by Linus Mar [address] on 8/6/2022 at 1:04 PM

## 2022-08-06 NOTE — GROUP NOTE
Group Therapy Note    Date: 8/6/2022    Group Start Time: 4256  Group End Time: 7741  Group Topic: [address] [place] [address]    Donato Nelson RN        Group Therapy Note    Attendees: 8/19       Patient's Goal:  To discuss the importance of healthy sleep hygiene. Notes:  Pt actively participated in group. Status After Intervention:  Improved    Participation Level:  Active Listener    Participation Quality: Appropriate      Speech:  normal      Thought Process/Content: Logical      Affective Functioning: Congruent      Mood:  WNL      Level of consciousness:  Alert and Oriented x4      Response to Learning: Progressing to goal      Endings: None Reported    Modes of Intervention: Education      Discipline Responsible: Registered Nurse      Signature:  Donato Nelson RN

## 2022-08-06 NOTE — GROUP NOTE
Group Therapy Note    Date: 8/6/2022    Group Start Time: 1100  Group End Time: 1200  Group Topic: Psychoeducation    [facility]    JOSSELINE Holder LSW        Group Therapy Note    Attendees: 9       Patient's Goal:  to participate in a Psychoeducational group    Notes:  Patient participated in identifying triggers of relapse.      Status After Intervention:  Improved    Participation Level: Interactive    Participation Quality: Appropriate      Speech:  normal      Thought Process/Content: Logical      Affective Functioning: Congruent      Mood:  calm      Level of consciousness:  Alert      Response to Learning: Able to verbalize current knowledge/experience      Endings: None Reported    Modes of Intervention: Education      Discipline Responsible: /Counselor      Signature:  JOSSELINE Holder LSW

## 2022-08-06 NOTE — GROUP NOTE
Group Therapy Note    Date: 8/6/2022    Group Start Time: 1630  Group End Time: 1700  Group Topic: Recreational    [facility]    Dragan Dennison        Group Therapy Note    Attendees: 14/20       Patient's Goal:  to participate in an activity, socializing and sharing with peers. Notes:  pt actively participated. Status After Intervention:  Unchanged    Participation Level: Active Listener and Minimal    Participation Quality: Appropriate      Speech:  normal      Thought Process/Content: Logical      Affective Functioning: Flat      Mood: euthymic      Level of consciousness:  Alert      Response to Learning: Progressing to goal      Endings: None Reported    Modes of Intervention: Socialization      Discipline Responsible: Behavorial Health Tech      Signature:   Dragan Dennison

## 2022-08-07 PROCEDURE — 6370000000 HC RX 637 (ALT 250 FOR IP): Performed by: PSYCHIATRY & NEUROLOGY

## 2022-08-07 PROCEDURE — 1240000000 HC EMOTIONAL WELLNESS R&B

## 2022-08-07 PROCEDURE — 6360000002 HC RX W HCPCS: Performed by: PSYCHIATRY & NEUROLOGY

## 2022-08-07 RX ORDER — OLANZAPINE 5 MG/1
5 TABLET ORAL NIGHTLY
Status: DISCONTINUED | OUTPATIENT
Start: 2022-08-07 | End: 2022-08-10 | Stop reason: HOSPADM

## 2022-08-07 RX ORDER — ACETAMINOPHEN 80 MG
TABLET,CHEWABLE ORAL ONCE
Status: DISCONTINUED | OUTPATIENT
Start: 2022-08-07 | End: 2022-08-10 | Stop reason: HOSPADM

## 2022-08-07 RX ADMIN — DIVALPROEX SODIUM 250 MG: 250 TABLET, DELAYED RELEASE ORAL at 13:35

## 2022-08-07 RX ADMIN — Medication 1 TABLET: at 21:41

## 2022-08-07 RX ADMIN — CETIRIZINE HYDROCHLORIDE 10 MG: 10 TABLET, FILM COATED ORAL at 08:45

## 2022-08-07 RX ADMIN — DIVALPROEX SODIUM 250 MG: 250 TABLET, DELAYED RELEASE ORAL at 21:41

## 2022-08-07 RX ADMIN — OLANZAPINE 5 MG: 5 TABLET, FILM COATED ORAL at 21:41

## 2022-08-07 RX ADMIN — DIVALPROEX SODIUM 250 MG: 250 TABLET, DELAYED RELEASE ORAL at 05:42

## 2022-08-07 RX ADMIN — FINASTERIDE 1.25 MG: 5 TABLET, FILM COATED ORAL at 08:45

## 2022-08-07 RX ADMIN — Medication 100 MG: at 08:45

## 2022-08-07 RX ADMIN — BUPRENORPHINE HYDROCHLORIDE AND NALOXONE HYDROCHLORIDE DIHYDRATE 2 TABLET: 8; 2 TABLET SUBLINGUAL at 08:45

## 2022-08-07 NOTE — GROUP NOTE
Group Therapy Note    Date: 8/7/2022    Group Start Time: 1630  Group End Time: 1700  Group Topic: Healthy Living/Wellness    [facility]    Rolo Newton        Group Therapy Note    Attendees: 16       Patient's Goal:  To learn about sleep hygiene by participating in a game    Notes:  Pt participated in group activity    Status After Intervention:  Improved    Participation Level:  Active Listener and Interactive    Participation Quality: Appropriate and Attentive      Speech:  normal      Thought Process/Content: Logical      Affective Functioning: Congruent      Mood: euthymic      Level of consciousness:  Alert and Oriented x4      Response to Learning: Able to verbalize current knowledge/experience      Endings: None Reported    Modes of Intervention: Education, Socialization, and Activity      Discipline Responsible: Behavorial Health Tech      Signature:  Rolo Newton

## 2022-08-07 NOTE — PROGRESS NOTES
Pt has been attending groups, reports that he has been learning things while at group. Pt is calm and cooperative. Pt reports that his anxiety is \"manageable\" and depression is moderate at 5/10. Pt denies SI/HI/AVH. Pt repots waking up once during the night and was able to fall back asleep. Pt reports adequate appetite. Pt is isolative to room if not in groups. Encouraged therapeutic milieu.

## 2022-08-07 NOTE — PROGRESS NOTES
Morning Community Meeting Topics    Ema Nikolai attended the morning community meeting on 8/7/22. Topics discussed today     [x] Introduction  Day of the week and date  Mask distribution  Current mask requirements  [x]Teams  Explanation of  Green and Blue team criteria  Nurses assigned to each team for today  Explanation about green and blue paper  Date  Patient's Name  Patient's Nurse  Goals  [x] Visitation  Announce the visiting hours for the day  Announce which team is allowed to have visitors for the day  Review any updated Covid 19 requirements for visitors during visitation  Vaccine Card or negative Covid test within 48 hours of visit  State Identification  Patients are reminded to alert the  at least 1 hour before visitation   [x] Unit Orientation  Coffee use  Phone location and etiquette  Shower locations  Wilson and dryer location and process  Common area expectations  Staff rounds expectation  [x] Meals   Educate patient to the menu  The patient is encouraged to fill out the menu to get preferences at mealtime  The patient is educated that if they do not fill out the menu, they will get the standard tray  The coffee pot is decaf, patient encouraged to order regular coffee from menu.   Educate patient to the meal process  Patient encouraged to eat snacks provided twice daily  Snacks may stay in patient room     [x] Discharge Process  Discharge expectations  Fill out the survey after discharge   [x] Hygiene  Daily showers encouraged  Showers availability discussed   Daily dressing encouraged  Discussed wearing street clothing  Education provided on where to place linens and clothing  Linens in the hamper  personal clothing does not go into the linen hamper  [x] Group   Patient encouraged to attend group provided  Time of Group Meetings discussed  Gentle reminder that attendance is a Physician order  [x] Movement  Chair exercises completed  Stretching completed  Notes:  GOAL : \" to go to groups\" Electronically signed by Brie Orozco on 8/7/2022 at 10:02 AM

## 2022-08-07 NOTE — PROGRESS NOTES
[facility]. FOLLOW-UP NOTE     8/6/2022   Patient was seen and examined in person, Chart reviewed   Patient's case discussed with staff/team    Chief Complaint: paranoia irritability  PATIENT STATES THAT HE RECENTLY RELAPSED ON AMPHETAMINES AND WAS BECOMING MORE ARGUMENTATIVE WITH HIS MOM. sAYS THAT MOM KNOWS HIM WELL AND SHE WAS AWARE THAT SOMETHING WAS NOT RIGHT  Interim History:     States he is feeling a little more depressed today. Not sure why. Patient stated he was doing well on his antidepressants however not sure which ones. He said it was in combination with olanzapine    Appetite:   [x] Normal/Unchanged  [] Increased  [] Decreased      Sleep:       [] Normal/Unchanged  [] Fair       [] Poor            Too  long  Energy:    [] Normal/Unchanged  [] Increased  [x] Decreased        SI [] Present  [] Absent    HI  []Present  [] Absent     Aggression:  [] yes  [x] no    Patient is [] able  [x] unable to CONTRACT FOR SAFETY     PAST MEDICAL/PSYCHIATRIC HISTORY:   History reviewed. No pertinent past medical history. FAMILY/SOCIAL HISTORY:  History reviewed. No pertinent family history.   Social History     Socioeconomic History    Marital status: Single     Spouse name: Not on file    Number of children: Not on file    Years of education: Not on file    Highest education level: Not on file   Occupational History    Not on file   Tobacco Use    Smoking status: Every Day     Packs/day: 1.00     Types: Cigarettes    Smokeless tobacco: Never   Vaping Use    Vaping Use: Never used   Substance and Sexual Activity    Alcohol use: Yes     Comment: socially    Drug use: Yes     Types: Opiates , Methamphetamines (Crystal Meth), Marijuana (Weed)     Comment: \"poppyseed tea\"    Sexual activity: Not on file   Other Topics Concern    Not on file   Social History Narrative    Not on file     Social Determinants of Health     Financial Resource Strain: Not on file   Food Insecurity: Not on file   Transportation Needs: Not on file   Physical Activity: Not on file   Stress: Not on file   Social Connections: Not on file   Intimate Partner Violence: Not on file   Housing Stability: Not on file           ROS:  [x] All negative/unchanged except if checked.  Explain positive(checked items) below:  [] Constitutional  [] Eyes  [] Ear/Nose/Mouth/Throat  [] Respiratory  [] CV  [] GI  []   [] Musculoskeletal  [] Skin/Breast  [] Neurological  [] Endocrine  [] Heme/Lymph  [] Allergic/Immunologic    Explanation:     MEDICATIONS:    Current Facility-Administered Medications:     pill splitter, , Does not apply, Once, Juancho Moreno MD    divalproex (DEPAKOTE) DR tablet 250 mg, 250 mg, Oral, 3 times per day, Juancho Moreno MD, 250 mg at 08/07/22 0542    QUEtiapine (SEROQUEL) tablet 50 mg, 50 mg, Oral, Nightly, Juancho Moreno MD, 50 mg at 08/06/22 2115    buprenorphine-naloxone (SUBOXONE) 8-2 MG SL tablet 2 tablet, 2 tablet, SubLINGual, Daily, Juancho Moreno MD, 2 tablet at 08/07/22 0845    finasteride (PROSCAR) tablet 1.25 mg, 1.25 mg, Oral, Daily, Juancho Moreno MD, 1.25 mg at 08/07/22 0845    fluticasone (FLONASE) 50 MCG/ACT nasal spray 1 spray, 1 spray, Each Nostril, Nightly PRN, Juancho Moreno MD    ketoconazole (NIZORAL) 2 % shampoo 1 Applicatorful, 1 Applicatorful, Topical, Daily PRN, Juancho Moreno MD, 1 Applicatorful at 24/68/25 0921    cetirizine (ZYRTEC) tablet 10 mg, 10 mg, Oral, Daily, Juancho Moreno MD, 10 mg at 08/07/22 0845    therapeutic multivitamin-minerals 1 tablet, 1 tablet, Oral, Nightly, Juancho Moreno MD, 1 tablet at 08/06/22 2115    haloperidol lactate (HALDOL) injection 5 mg, 5 mg, IntraMUSCular, Q6H PRN **OR** haloperidol (HALDOL) tablet 5 mg, 5 mg, Oral, Q6H PRN, Juancho Moreno MD    hydrOXYzine (VISTARIL) injection 50 mg, 50 mg, IntraMUSCular, Q6H PRN **OR** hydrOXYzine pamoate (VISTARIL) capsule 50 mg, 50 mg, Oral, Q6H PRN, Juancho Moreno MD, 50 mg at 08/03/22 0912    thiamine tablet 100 mg, 100 mg, Oral, Daily, Ricci Truong MD, 100 mg at 08/07/22 0845    LORazepam (ATIVAN) tablet 1 mg, 1 mg, Oral, Q1H PRN **OR** LORazepam (ATIVAN) injection 1 mg, 1 mg, IntraMUSCular, Q1H PRN **OR** LORazepam (ATIVAN) tablet 2 mg, 2 mg, Oral, Q1H PRN **OR** LORazepam (ATIVAN) injection 2 mg, 2 mg, IntraMUSCular, Q1H PRN **OR** LORazepam (ATIVAN) tablet 3 mg, 3 mg, Oral, Q1H PRN **OR** LORazepam (ATIVAN) injection 3 mg, 3 mg, IntraMUSCular, Q1H PRN **OR** LORazepam (ATIVAN) tablet 4 mg, 4 mg, Oral, Q1H PRN **OR** LORazepam (ATIVAN) injection 4 mg, 4 mg, IntraMUSCular, Q1H PRN, Ricci Truong MD    nicotine polacrilex (COMMIT) lozenge 2 mg, 2 mg, Oral, Q1H PRN, Ricci Truong MD    acetaminophen (TYLENOL) tablet 650 mg, 650 mg, Oral, Q4H PRN, Ricci Truong MD    magnesium hydroxide (MILK OF MAGNESIA) 400 MG/5ML suspension 30 mL, 30 mL, Oral, Daily PRN, Ricci Truong MD    aluminum & magnesium hydroxide-simethicone (MAALOX) 200-200-20 MG/5ML suspension 30 mL, 30 mL, Oral, PRN, Ricci Truong MD    benztropine mesylate (COGENTIN) injection 2 mg, 2 mg, IntraMUSCular, BID PRN, Ricci Truong MD    traZODone (DESYREL) tablet 50 mg, 50 mg, Oral, Nightly PRN, Ricci Truong MD      Examination:  BP (!) 141/85 Comment: RN notified  Pulse 69   Temp 98 °F (36.7 °C) (Oral)   Resp 18   Ht 5' 8\" (1.727 m)   Wt 185 lb (83.9 kg)   SpO2 99%   BMI 28.13 kg/m²   Gait - steady  Medication side effects(SE): no    .Mental Status Examination:  Level of consciousness:  within normal limits  Appearance:  ill-appearing  Behavior/Motor:  psychomotor retardation  Attitude toward examiner:  evasive, guarded and withdrawn  Speech:  slow, whispered, increased latency and low productivity  Mood:  Depressed/anxious  Affect:  mood congruent  Thought processes:  organized for the most part  Thought content:  Preoccupied with anxiety  Homocidal ideation denies  Suicidal Ideation: Passive suicidal ideation  Delusions:  paranoid  Perceptual Disturbance:  denies any perceptual disturbance  Cognition:  Memory impaired immediate recall  Insight:  poor  Judgment:  poor  Impulse control poor  Protective factors: none     ASSESSMENT:   Patient symptoms are:  [] Well controlled  [] Improving  [] Worsening  [] No change      Diagnosis:   Principal Problem:    Bipolar affective disorder, depressed, severe, with psychotic behavior ([facility])  Resolved Problems:    * No resolved hospital problems. *      LABS:    No results for input(s): WBC, HGB, PLT in the last 72 hours. No results for input(s): NA, K, CL, CO2, BUN, CREATININE, GLUCOSE in the last 72 hours. No results for input(s): BILITOT, ALKPHOS, AST, ALT in the last 72 hours. Lab Results   Component Value Date/Time    LABAMPH Neg 08/02/2022 11:45 AM    BARBSCNU Neg 08/02/2022 11:45 AM    LABBENZ Neg 08/02/2022 11:45 AM    LABMETH Neg 08/02/2022 11:45 AM    OPIATESCREENURINE Neg 08/02/2022 11:45 AM    PHENCYCLIDINESCREENURINE Neg 08/02/2022 11:45 AM    ETOH <10 08/02/2022 11:56 AM     Lab Results   Component Value Date/Time    TSH 0.799 08/02/2022 11:55 AM     No results found for: LITHIUM  No results found for: VALPROATE, CBMZ      Treatment Plan:  Reviewed current Medications with the patient. Medication as ordered  Risks, benefits, side effects, drug-to-drug interactions and alternatives to treatment were discussed. Collateral information:   CD evaluation  Encourage patient to attend group and other milieu activities.   Discharge planning discussed with the patient and treatment team.    PSYCHOTHERAPY/COUNSELING:  [x] Therapeutic interview  [x] Supportive  [] CBT  [] Ongoing  [] Other    [x] Patient continues to need, on a daily basis, active treatment furnished directly by or requiring the supervision of inpatient psychiatric personnel      Anticipated Length of stay:            Electronically signed by Karena Moreno MD on

## 2022-08-07 NOTE — GROUP NOTE
Group Therapy Note    Date: 8/7/2022    Group Start Time: 1000  Group End Time: 1100  Group Topic: Psychoeducation    [facility] BHOTIS Stephens        Group Therapy Note    Attendees: 10       Patient's Goal:  \"to go to groups\"     Notes:  Pt. attended the 1000 skill group. Feeling better. Good concentration. Relaxed. Stayed to himself but was pleasant upon approach. Status After Intervention:  Improved    Participation Level:  Active Listener and Interactive    Participation Quality: Appropriate and Attentive      Speech:  normal      Thought Process/Content: Logical      Affective Functioning: Congruent      Mood:  calm      Level of consciousness:  Alert, Oriented x4, and Attentive      Response to Learning: Progressing to goal      Endings: None Reported    Modes of Intervention: Education, Support, Socialization, and Activity      Discipline Responsible: Psychoeducational Specialist      Signature:  Pierce Almodovar

## 2022-08-07 NOTE — GROUP NOTE
Group Therapy Note    Date: 8/7/2022    Group Start Time: 1100  Group End Time: 7288  Group Topic: Group Therapy    [facility]    PADMAJA Shannon        Group Therapy Note    Attendees: 13       Patient's Goal:  To participate in a  goal oriented group. Notes:  Patient stated his goal is to get better and remain stable. Status After Intervention:  Unchanged    Participation Level: Active Listener and Interactive    Participation Quality: Appropriate and Sharing      Speech:  normal      Thought Process/Content: Logical      Affective Functioning: Congruent      Mood: anxious      Level of consciousness:  Alert      Response to Learning: Able to verbalize current knowledge/experience      Endings: None Reported    Modes of Intervention: Education      Discipline Responsible: /Counselor      Signature:   PADMAJA Shannon

## 2022-08-07 NOTE — PROGRESS NOTES
Patient denies SI/HI or AVH. Patient stays in room stating that he slept better last night. Patient does not get up and go to art therapy. Patient will be reminded to go to psychotherapy. Patient collects the papers and puts them in his folder that he has been given from groups. Patient polite and cooperative. Pts affect remains flat and nonreactive. Patient talks about mother on hospice. Patients affect does not change talking about this. Patient seems to be suppressing his feelings. Patient eats meals. Pt will be monitored.

## 2022-08-08 PROCEDURE — 6370000000 HC RX 637 (ALT 250 FOR IP): Performed by: PSYCHIATRY & NEUROLOGY

## 2022-08-08 PROCEDURE — 99232 SBSQ HOSP IP/OBS MODERATE 35: CPT | Performed by: PSYCHIATRY & NEUROLOGY

## 2022-08-08 PROCEDURE — 6360000002 HC RX W HCPCS: Performed by: PSYCHIATRY & NEUROLOGY

## 2022-08-08 PROCEDURE — 1240000000 HC EMOTIONAL WELLNESS R&B

## 2022-08-08 RX ADMIN — DIVALPROEX SODIUM 250 MG: 250 TABLET, DELAYED RELEASE ORAL at 14:08

## 2022-08-08 RX ADMIN — FINASTERIDE 1.25 MG: 5 TABLET, FILM COATED ORAL at 08:56

## 2022-08-08 RX ADMIN — DIVALPROEX SODIUM 250 MG: 250 TABLET, DELAYED RELEASE ORAL at 20:44

## 2022-08-08 RX ADMIN — DIVALPROEX SODIUM 250 MG: 250 TABLET, DELAYED RELEASE ORAL at 06:33

## 2022-08-08 RX ADMIN — CETIRIZINE HYDROCHLORIDE 10 MG: 10 TABLET, FILM COATED ORAL at 08:56

## 2022-08-08 RX ADMIN — Medication 100 MG: at 08:56

## 2022-08-08 RX ADMIN — Medication 1 TABLET: at 20:44

## 2022-08-08 RX ADMIN — OLANZAPINE 5 MG: 5 TABLET, FILM COATED ORAL at 20:44

## 2022-08-08 RX ADMIN — BUPRENORPHINE HYDROCHLORIDE AND NALOXONE HYDROCHLORIDE DIHYDRATE 2 TABLET: 8; 2 TABLET SUBLINGUAL at 08:56

## 2022-08-08 NOTE — PROGRESS NOTES
Pt noted to be calm and cooperative with care. Pt has flat sad affect, brightens on approach, pt is social with select peers and staff, guarded and watchful at times. Pt denies SI,HI,AVH, pt reports anxiety is improved but depression remains.

## 2022-08-08 NOTE — GROUP NOTE
Group Therapy Note    Date: 8/8/2022    Group Start Time: 1300  Group End Time: 1400  Group Topic: Music Therapy    [facility]    Mariaelena Simms        Group Therapy Note    Attendees: 11       Patient's Goal:  Patient attended group    Notes:  Patient was attentive    Status After Intervention:  Unchanged    Participation Level:  Active Listener    Participation Quality: Appropriate and Attentive      Speech:  normal      Thought Process/Content: Logical      Affective Functioning: Congruent      Mood: euthymic      Level of consciousness:  Alert, Oriented x4, and Attentive      Response to Learning: Progressing to goal      Endings: None Reported    Modes of Intervention: Activity      Discipline Responsible: [address], [employer] Tech      Signature:  Mariaelena Simms

## 2022-08-08 NOTE — PROGRESS NOTES
Pt. refused to attend the 1000 skills group, despite staff encouragement.   Electronically signed by Doris Nguyen on 8/8/2022 at 11:29 AM

## 2022-08-08 NOTE — CARE COORDINATION
FAMILY COLLATERAL NOTE    Family/Support Name: sanyd  Contact #:[phone]  Relationship to Pt[de-identified] mom    Called mom for collateral, she reports her hospice nurse just left and her aide just arrived. Requesting we call back in a hour or so to speak.  Electronically signed by MARK Stephenson on 8/8/2022 at 2:17 PM

## 2022-08-08 NOTE — PROGRESS NOTES
Pt denies SI/HI or AVH. Patient spends most of  his time in his room. Patient does come out for meals and some groups. Patient talks about being tired but is sleeping better. Patient dep and anx and not able to tell this nurse his future plans. Patients mother is on Hospice and they share the expenses for living. Patient has blank sad affect that does brighten upon talking.  Electronically signed by Be Luciano LPN on 0/1/4911 at 0:42 PM'

## 2022-08-08 NOTE — GROUP NOTE
Group Therapy Note    Date: 8/8/2022    Group Start Time: 1630  Group End Time: 7826  Group Topic: Psychoeducation    [facility]    JOSSELINE Thomason LSW        Group Therapy Note    Attendees: 14       Patient's Goal:  to participate in a Psychoeducational group    Notes:  Patient was an active listener during group. Status After Intervention:  Improved    Participation Level:  Active Listener    Participation Quality: Appropriate      Speech:  normal      Thought Process/Content: Logical      Affective Functioning: Congruent      Mood:  calm      Level of consciousness:  Alert      Response to Learning: Able to verbalize current knowledge/experience      Endings: None Reported    Modes of Intervention: Education      Discipline Responsible: /Counselor      Signature:  JOSSELINE Thomason LSW

## 2022-08-08 NOTE — PROGRESS NOTES
[facility] FOLLOW-UP NOTE       8/8/2022     Patient was seen and examined in person, Chart reviewed   Patient's case discussed with staff/team    Chief Complaint: depression, paranoid    Interim History:     Pt had a bad day yesterday when he felt paranoid and suspicious  Zyprexa was started again at the request of patient  Slightly better today though still feel anxious and paranoid  Has been attending groups  Talking to his family  Less depressed  Appetite:   [x] Normal/Unchanged  [] Increased  [] Decreased      Sleep:       [] Normal/Unchanged  [x] Fair       [x] Poor              Energy:    [] Normal/Unchanged  [] Increased  [x] Decreased        SI [] Present  [] Absent    HI  []Present  [] Absent     Aggression:  [] yes  [] no    Patient is [] able  [x] unable to CONTRACT FOR SAFETY     PAST MEDICAL/PSYCHIATRIC HISTORY:   History reviewed. No pertinent past medical history. FAMILY/SOCIAL HISTORY:  History reviewed. No pertinent family history.   Social History     Socioeconomic History    Marital status: Single     Spouse name: Not on file    Number of children: Not on file    Years of education: Not on file    Highest education level: Not on file   Occupational History    Not on file   Tobacco Use    Smoking status: Every Day     Packs/day: 1.00     Types: Cigarettes    Smokeless tobacco: Never   Vaping Use    Vaping Use: Never used   Substance and Sexual Activity    Alcohol use: Yes     Comment: socially    Drug use: Yes     Types: Opiates , Methamphetamines (Crystal Meth), Marijuana (Weed)     Comment: \"poppyseed tea\"    Sexual activity: Not on file   Other Topics Concern    Not on file   Social History Narrative    Not on file     Social Determinants of Health     Financial Resource Strain: Not on file   Food Insecurity: Not on file   Transportation Needs: Not on file   Physical Activity: Not on file   Stress: Not on file   Social Connections: Not on file Intimate Partner Violence: Not on file   Housing Stability: Not on file           ROS:  [x] All negative/unchanged except if checked.  Explain positive(checked items) below:  [] Constitutional  [] Eyes  [] Ear/Nose/Mouth/Throat  [] Respiratory  [] CV  [] GI  []   [] Musculoskeletal  [] Skin/Breast  [] Neurological  [] Endocrine  [] Heme/Lymph  [] Allergic/Immunologic    Explanation:     MEDICATIONS:    Current Facility-Administered Medications:     pill splitter, , Does not apply, Once, Kyle Fernández MD    OLANZapine (ZYPREXA) tablet 5 mg, 5 mg, Oral, Nightly, Gerry Simms MD, 5 mg at 08/07/22 2141    divalproex (DEPAKOTE) DR tablet 250 mg, 250 mg, Oral, 3 times per day, Kyle Fernández MD, 250 mg at 08/08/22 1408    buprenorphine-naloxone (SUBOXONE) 8-2 MG SL tablet 2 tablet, 2 tablet, SubLINGual, Daily, Kyle Fernández MD, 2 tablet at 08/08/22 0856    finasteride (PROSCAR) tablet 1.25 mg, 1.25 mg, Oral, Daily, Kyle Fernández MD, 1.25 mg at 08/08/22 0856    fluticasone (FLONASE) 50 MCG/ACT nasal spray 1 spray, 1 spray, Each Nostril, Nightly PRN, Kyle Fernández MD    ketoconazole (NIZORAL) 2 % shampoo 1 Applicatorful, 1 Applicatorful, Topical, Daily PRN, Kyle Fernández MD, 1 Applicatorful at 30/96/70 0921    cetirizine (ZYRTEC) tablet 10 mg, 10 mg, Oral, Daily, Kyle Fernández MD, 10 mg at 08/08/22 0856    therapeutic multivitamin-minerals 1 tablet, 1 tablet, Oral, Nightly, Kyle Fernández MD, 1 tablet at 08/07/22 2141    haloperidol lactate (HALDOL) injection 5 mg, 5 mg, IntraMUSCular, Q6H PRN **OR** haloperidol (HALDOL) tablet 5 mg, 5 mg, Oral, Q6H PRN, Kyle Fernández MD    hydrOXYzine (VISTARIL) injection 50 mg, 50 mg, IntraMUSCular, Q6H PRN **OR** hydrOXYzine pamoate (VISTARIL) capsule 50 mg, 50 mg, Oral, Q6H PRN, Kyle Fernández MD, 50 mg at 08/03/22 0912    thiamine tablet 100 mg, 100 mg, Oral, Daily, Kyle Fernández MD, 100 mg at 08/08/22 0856    LORazepam (ATIVAN) tablet 1 mg, 1 mg, Oral, Q1H PRN **OR** LORazepam (ATIVAN) injection 1 mg, 1 mg, IntraMUSCular, Q1H PRN **OR** LORazepam (ATIVAN) tablet 2 mg, 2 mg, Oral, Q1H PRN **OR** LORazepam (ATIVAN) injection 2 mg, 2 mg, IntraMUSCular, Q1H PRN **OR** LORazepam (ATIVAN) tablet 3 mg, 3 mg, Oral, Q1H PRN **OR** LORazepam (ATIVAN) injection 3 mg, 3 mg, IntraMUSCular, Q1H PRN **OR** LORazepam (ATIVAN) tablet 4 mg, 4 mg, Oral, Q1H PRN **OR** LORazepam (ATIVAN) injection 4 mg, 4 mg, IntraMUSCular, Q1H PRN, Rio Hadley MD    nicotine polacrilex (COMMIT) lozenge 2 mg, 2 mg, Oral, Q1H PRN, Rio Hadley MD    acetaminophen (TYLENOL) tablet 650 mg, 650 mg, Oral, Q4H PRN, Rio Hadley MD    magnesium hydroxide (MILK OF MAGNESIA) 400 MG/5ML suspension 30 mL, 30 mL, Oral, Daily PRN, Rio Hadley MD    aluminum & magnesium hydroxide-simethicone (MAALOX) 200-200-20 MG/5ML suspension 30 mL, 30 mL, Oral, PRN, Rio Hadley MD    benztropine mesylate (COGENTIN) injection 2 mg, 2 mg, IntraMUSCular, BID PRN, Rio Hadley MD    traZODone (DESYREL) tablet 50 mg, 50 mg, Oral, Nightly PRN, Rio Hadley MD      Examination:  /78   Pulse 52   Temp 97.9 °F (36.6 °C) (Oral)   Resp 18   Ht 5' 8\" (1.727 m)   Wt 185 lb (83.9 kg)   SpO2 98%   BMI 28.13 kg/m²   Gait - steady  Medication side effects(SE): no    Mental Status Examination:    Level of consciousness:  within normal limits   Appearance:  poor grooming and poor hygiene  Behavior/Motor:  psychomotor retardation  Attitude toward examiner:  attentive  Speech:  slow   Mood: dysthymic  Affect:  anxious  Thought processes:  slow   Thought content:  Preoccupied with hopeless feeling, paranoid thoughts   Cognition:  oriented to person, place, and time   Concentration poor  Insight poor   Judgement poor     ASSESSMENT:   Patient symptoms are:  [] Well controlled  [] Improving  [] Worsening  [] No change      Diagnosis:   Principal Problem:    Bipolar affective disorder, depressed, severe, with psychotic behavior ([facility] [address]  Resolved Problems:    * No resolved hospital problems. *      LABS:    No results for input(s): WBC, HGB, PLT in the last 72 hours. No results for input(s): NA, K, CL, CO2, BUN, CREATININE, GLUCOSE in the last 72 hours. No results for input(s): BILITOT, ALKPHOS, AST, ALT in the last 72 hours. Lab Results   Component Value Date/Time    LABAMPH Neg 08/02/2022 11:45 AM    BARBSCNU Neg 08/02/2022 11:45 AM    LABBENZ Neg 08/02/2022 11:45 AM    LABMETH Neg 08/02/2022 11:45 AM    OPIATESCREENURINE Neg 08/02/2022 11:45 AM    PHENCYCLIDINESCREENURINE Neg 08/02/2022 11:45 AM    ETOH <10 08/02/2022 11:56 AM     Lab Results   Component Value Date/Time    TSH 0.799 08/02/2022 11:55 AM     No results found for: LITHIUM  No results found for: VALPROATE, CBMZ      Treatment Plan:  Reviewed current Medications with the patient. Medication as ordered  Risks, benefits, side effects, drug-to-drug interactions and alternatives to treatment were discussed. Collateral information:   CD evaluation  Encourage patient to attend group and other milieu activities.   Discharge planning discussed with the patient and treatment team.    PSYCHOTHERAPY/COUNSELING:  [x] Therapeutic interview  [x] Supportive  [] CBT  [] Ongoing  [] Other    [x] Patient continues to need, on a daily basis, active treatment furnished directly by or requiring the supervision of inpatient psychiatric personnel      Anticipated Length of stay:            Electronically signed by Amanuel Virk MD on 8/8/2022 at 4:07 PM

## 2022-08-08 NOTE — CARE COORDINATION
Group Therapy Note    Date: 8/8/2022  Start Time: 1563  End Time:  2438    Number of Participants: 8    Type of Group: Cognitive Skills    Patient's Goal:  To participate in mood management group. Notes: Patient declined to attend psychoeducation group at [phone] despite encouragement by staff.      Discipline Responsible: /Counselor    PADMAJA Melendez

## 2022-08-08 NOTE — PROGRESS NOTES
Pt. declined to attend the 0900 community meeting, despite staff encouragement.  GOAL :\"to go to all groups\" Electronically signed by Ana Maria Alberto on 8/8/2022 at 9:51 AM

## 2022-08-08 NOTE — PROGRESS NOTES
[facility]. FOLLOW-UP NOTE     8/7/2022   Patient was seen and examined in person, Chart reviewed   Patient's case discussed with staff/team    Chief Complaint: paranoia irritability, fighting with mom  PATIENT STATES THAT HE RECENTLY RELAPSED ON [address] [facility] [address] [facility]OM. sAYS THAT MOM KNOWS HIM WELL AND SHE WAS AWARE THAT SOMETHING WAS NOT RIGHT  Interim History:     Sworse today then yesterday. Crying. Said he I not suicidal, but if this feelin persists he does not believe he will tolerate it . He believes that olanzapine/antidepressant was the best med combo for him. Appetite:   [x] Normal/Unchanged  [] Increased  [] Decreased      Sleep:       [] Normal/Unchanged  [] Fair       [] Poor            Too  long  Energy:    [] Normal/Unchanged  [] Increased  [x] Decreased        SI [x] Present  [] Absent some fleeting thoughts    HI  []Present  [] Absent     Aggression:  [] yes  [x] no    Patient is [] able  [x] unable to CONTRACT FOR SAFETY     PAST MEDICAL/PSYCHIATRIC HISTORY:   History reviewed. No pertinent past medical history. FAMILY/SOCIAL HISTORY:  History reviewed. No pertinent family history.   Social History     Socioeconomic History    Marital status: Single     Spouse name: Not on file    Number of children: Not on file    Years of education: Not on file    Highest education level: Not on file   Occupational History    Not on file   Tobacco Use    Smoking status: Every Day     Packs/day: 1.00     Types: Cigarettes    Smokeless tobacco: Never   Vaping Use    Vaping Use: Never used   Substance and Sexual Activity    Alcohol use: Yes     Comment: socially    Drug use: Yes     Types: Opiates , Methamphetamines (Crystal Meth), Marijuana (Carliss Stair)     Comment: \"poppyseed tea\"    Sexual activity: Not on file   Other Topics Concern    Not on file   Social History Narrative    Not on file     Social Determinants of Health     Financial Resource Strain: Not on file   Food Insecurity: Not on file   Transportation Needs: Not on file   Physical Activity: Not on file   Stress: Not on file   Social Connections: Not on file   Intimate Partner Violence: Not on file   Housing Stability: Not on file           ROS:  [x] All negative/unchanged except if checked.  Explain positive(checked items) below:  [] Constitutional  [] Eyes  [] Ear/Nose/Mouth/Throat  [] Respiratory  [] CV  [] GI  []   [] Musculoskeletal  [] Skin/Breast  [] Neurological  [] Endocrine  [] Heme/Lymph  [] Allergic/Immunologic    Explanation:     MEDICATIONS:    Current Facility-Administered Medications:     pill splitter, , Does not apply, Once, Jono Morocho MD    OLANZapine (ZYPREXA) tablet 5 mg, 5 mg, Oral, Nightly, Benitez Staples MD, 5 mg at 08/07/22 2141    divalproex (DEPAKOTE) DR tablet 250 mg, 250 mg, Oral, 3 times per day, Jono Morocho MD, 250 mg at 08/08/22 6305    buprenorphine-naloxone (SUBOXONE) 8-2 MG SL tablet 2 tablet, 2 tablet, SubLINGual, Daily, Jono Morocho MD, 2 tablet at 08/07/22 0845    finasteride (PROSCAR) tablet 1.25 mg, 1.25 mg, Oral, Daily, Jono Morocho MD, 1.25 mg at 08/07/22 0845    fluticasone (FLONASE) 50 MCG/ACT nasal spray 1 spray, 1 spray, Each Nostril, Nightly PRN, Jono Morocho MD    ketoconazole (NIZORAL) 2 % shampoo 1 Applicatorful, 1 Applicatorful, Topical, Daily PRN, Jono Morocho MD, 1 Applicatorful at 15/41/87 0921    cetirizine (ZYRTEC) tablet 10 mg, 10 mg, Oral, Daily, Jono Morocho MD, 10 mg at 08/07/22 0845    therapeutic multivitamin-minerals 1 tablet, 1 tablet, Oral, Nightly, Jono Morocho MD, 1 tablet at 08/07/22 2141    haloperidol lactate (HALDOL) injection 5 mg, 5 mg, IntraMUSCular, Q6H PRN **OR** haloperidol (HALDOL) tablet 5 mg, 5 mg, Oral, Q6H PRN, Jono Morocho MD    hydrOXYzine (VISTARIL) injection 50 mg, 50 mg, IntraMUSCular, Q6H PRN **OR** hydrOXYzine pamoate (VISTARIL) capsule 50 mg, 50 mg, Oral, Q6H PRN, Stanislaw Lang MD, 50 mg at 08/03/22 0912    thiamine tablet 100 mg, 100 mg, Oral, Daily, Stanislaw Lang MD, 100 mg at 08/07/22 0845    LORazepam (ATIVAN) tablet 1 mg, 1 mg, Oral, Q1H PRN **OR** LORazepam (ATIVAN) injection 1 mg, 1 mg, IntraMUSCular, Q1H PRN **OR** LORazepam (ATIVAN) tablet 2 mg, 2 mg, Oral, Q1H PRN **OR** LORazepam (ATIVAN) injection 2 mg, 2 mg, IntraMUSCular, Q1H PRN **OR** LORazepam (ATIVAN) tablet 3 mg, 3 mg, Oral, Q1H PRN **OR** LORazepam (ATIVAN) injection 3 mg, 3 mg, IntraMUSCular, Q1H PRN **OR** LORazepam (ATIVAN) tablet 4 mg, 4 mg, Oral, Q1H PRN **OR** LORazepam (ATIVAN) injection 4 mg, 4 mg, IntraMUSCular, Q1H PRN, Stanislaw Lang MD    nicotine polacrilex (COMMIT) lozenge 2 mg, 2 mg, Oral, Q1H PRN, Stanislaw Lang MD    acetaminophen (TYLENOL) tablet 650 mg, 650 mg, Oral, Q4H PRN, Stanislaw Lang MD    magnesium hydroxide (MILK OF MAGNESIA) 400 MG/5ML suspension 30 mL, 30 mL, Oral, Daily PRN, Stanislaw Lang MD    aluminum & magnesium hydroxide-simethicone (MAALOX) 200-200-20 MG/5ML suspension 30 mL, 30 mL, Oral, PRN, Stanislaw Lang MD    benztropine mesylate (COGENTIN) injection 2 mg, 2 mg, IntraMUSCular, BID PRN, Stanislaw Lang MD    traZODone (DESYREL) tablet 50 mg, 50 mg, Oral, Nightly PRN, Stanislaw Lang MD      Examination:  /78   Pulse 74   Temp 98 °F (36.7 °C) (Oral)   Resp 18   Ht 5' 8\" (1.727 m)   Wt 185 lb (83.9 kg)   SpO2 96%   BMI 28.13 kg/m²   Gait - steady  Medication side effects(SE): no    .Mental Status Examination:  Level of consciousness:  within normal limits  Appearance:  ill-appearing  Behavior/Motor:  psychomotor retardation  Attitude toward examiner:  evasive, guarded and withdrawn  Speech:  slow, whispered, increased latency and low productivity  Mood:  Depressed/anxious  Affect:  mood congruent  Thought processes:  organized for the most part  Thought content:  Preoccupied with anxiety  Homocidal ideation denies  Suicidal Ideation:  Passive suicidal ideation  Delusions:  paranoid  Perceptual Disturbance:  denies any perceptual disturbance  Cognition:  Memory impaired immediate recall  Insight:  poor  Judgment:  poor  Impulse control poor  Protective factors: none     ASSESSMENT:   Patient symptoms are:  [] Well controlled  [] Improving  [x] Worsening  [] No change      Diagnosis:   Principal Problem:    Bipolar affective disorder, depressed, severe, with psychotic behavior ([facility] [address]  Resolved Problems:    * No resolved hospital problems. *      LABS:    No results for input(s): WBC, HGB, PLT in the last 72 hours. No results for input(s): NA, K, CL, CO2, BUN, CREATININE, GLUCOSE in the last 72 hours. No results for input(s): BILITOT, ALKPHOS, AST, ALT in the last 72 hours. Lab Results   Component Value Date/Time    LABAMPH Neg 08/02/2022 11:45 AM    BARBSCNU Neg 08/02/2022 11:45 AM    LABBENZ Neg 08/02/2022 11:45 AM    LABMETH Neg 08/02/2022 11:45 AM    OPIATESCREENURINE Neg 08/02/2022 11:45 AM    PHENCYCLIDINESCREENURINE Neg 08/02/2022 11:45 AM    ETOH <10 08/02/2022 11:56 AM     Lab Results   Component Value Date/Time    TSH 0.799 08/02/2022 11:55 AM     No results found for: LITHIUM  No results found for: VALPROATE, CBMZ      Treatment Plan:  Reviewed current Medications with the patient. Medication as ordered  Risks, benefits, side effects, drug-to-drug interactions and alternatives to treatment were discussed. Collateral information:   CD evaluation  Encourage patient to attend group and other milieu activities.   Discharge planning discussed with the patient and treatment team.    PSYCHOTHERAPY/COUNSELING:  [x] Therapeutic interview  [x] Supportive  [] CBT  [] Ongoing  [] Other    [x] Patient continues to need, on a daily basis, active treatment furnished directly by or requiring the supervision of inpatient psychiatric personnel      Anticipated Length of stay:     STOP SEROQUEL  START OLANZAPINE 5 MG AT BEDTIME.        Electronically signed by Claudine Mcgill MD on 8/6/2022 at 6:59 AM

## 2022-08-09 PROCEDURE — 1240000000 HC EMOTIONAL WELLNESS R&B

## 2022-08-09 PROCEDURE — 6370000000 HC RX 637 (ALT 250 FOR IP): Performed by: PSYCHIATRY & NEUROLOGY

## 2022-08-09 PROCEDURE — 6360000002 HC RX W HCPCS: Performed by: PSYCHIATRY & NEUROLOGY

## 2022-08-09 PROCEDURE — 99232 SBSQ HOSP IP/OBS MODERATE 35: CPT | Performed by: PSYCHIATRY & NEUROLOGY

## 2022-08-09 PROCEDURE — 90833 PSYTX W PT W E/M 30 MIN: CPT | Performed by: PSYCHIATRY & NEUROLOGY

## 2022-08-09 RX ADMIN — OLANZAPINE 5 MG: 5 TABLET, FILM COATED ORAL at 21:00

## 2022-08-09 RX ADMIN — DIVALPROEX SODIUM 250 MG: 250 TABLET, DELAYED RELEASE ORAL at 14:03

## 2022-08-09 RX ADMIN — Medication 1 TABLET: at 21:00

## 2022-08-09 RX ADMIN — Medication 100 MG: at 08:51

## 2022-08-09 RX ADMIN — DIVALPROEX SODIUM 250 MG: 250 TABLET, DELAYED RELEASE ORAL at 21:44

## 2022-08-09 RX ADMIN — FINASTERIDE 1.25 MG: 5 TABLET, FILM COATED ORAL at 08:50

## 2022-08-09 RX ADMIN — DIVALPROEX SODIUM 250 MG: 250 TABLET, DELAYED RELEASE ORAL at 06:16

## 2022-08-09 RX ADMIN — BUPRENORPHINE HYDROCHLORIDE AND NALOXONE HYDROCHLORIDE DIHYDRATE 2 TABLET: 8; 2 TABLET SUBLINGUAL at 08:51

## 2022-08-09 RX ADMIN — CETIRIZINE HYDROCHLORIDE 10 MG: 10 TABLET, FILM COATED ORAL at 08:50

## 2022-08-09 NOTE — FLOWSHEET NOTE
Pt assessment was done in this pt's room pt was lying in his bed with a flat and sad affect. Pt is not social with peers and isolates to his room a majority of the afternoon. Pt has poor eye contact and takes care of his mother at home. Pt rates his depression a 4/5 and anxiety a 4/10 with ten being the worst.pt denies SI,HI,AVH.

## 2022-08-09 NOTE — GROUP NOTE
Group Therapy Note    Date: 8/8/2022    Group Start Time: 2045  Group End Time: 2200  Group Topic: Psychoeducation    [facility]    JOSSELINE Bond LSW        Group Therapy Note    Attendees: 13       Patient's Goal:  to participate in a recreational activity and wrap up. Notes:  Patient participated in [employer] game.      Status After Intervention:  Improved    Participation Level: Interactive    Participation Quality: Appropriate      Speech:  normal      Thought Process/Content: Logical      Affective Functioning: Congruent      Mood:  calm      Level of consciousness:  Alert      Response to Learning: Able to verbalize current knowledge/experience      Endings: None Reported    Modes of Intervention: Education      Discipline Responsible: /Counselor      Signature:  JOSSELINE Bond LSW

## 2022-08-09 NOTE — GROUP NOTE
Group Therapy Note    Date: 8/9/2022    Group Start Time: 1630  Group End Time: 3528  Group Topic: Psychoeducation    [facility]    JOSSELINE Pedersen, EREN        Group Therapy Note    Attendees: 12       Patient's Goal:  to participate in a Psychoeducational group    Notes:  Patient participated in the ball toss game. He shared that his favorite color is blue and his favorite food is [id number].      Status After Intervention:  Improved    Participation Level: Interactive    Participation Quality: Appropriate      Speech:  normal      Thought Process/Content: Logical      Affective Functioning: Congruent      Mood:  calm      Level of consciousness:  Alert      Response to Learning: Able to verbalize current knowledge/experience      Endings: None Reported    Modes of Intervention: Education      Discipline Responsible: /Counselor      Signature:  JOSSELINE Pedersen, EREN

## 2022-08-09 NOTE — CARE COORDINATION
Provided pt with pamphlets for LGR, [facility] and [address] [place] [address] support groups. Pt reports he will let writer know if he wants us to complete a referral for [facility] while on the unit.  Electronically signed by MARK Guerrero on 8/9/2022 at 11:33 AM

## 2022-08-09 NOTE — PROGRESS NOTES
Patient out on  unit, attending groups  Not social with peers  Patient says he keeps to himself at home too, does not hang out with any friends,  just his mom  Poor eye contact  Flat , no emotion  No siblings, dad has been gone for 20 years or so, lives in [place] [place]  When asked about his support system he said he has many people within his medical network and can get more  No close friends or family, just mom  Has not worked since 2017 , but has finished high school  Anxiety and depression,   # 4-5  Denies all  Has drugs delivered  to his house,  Stimulants the latest, that's why he couldn't sleep. , prior to coming in the hospital.

## 2022-08-09 NOTE — PROGRESS NOTES
Bruce Andrade \[facility]\"" 89. FOLLOW-UP NOTE       8/9/2022     Patient was seen and examined in person, Chart reviewed   Patient's case discussed with staff/team    Chief Complaint: depression, paranoid    Interim History:     Pt feel better today, Less depressed  Less anxious  Social anxiety major issue which keep him isolated  Less paranoid  Tolerating medication well  Has been talking with his mom  Collateral from mom reviewed    Appetite:   [x] Normal/Unchanged  [] Increased  [] Decreased      Sleep:       [] Normal/Unchanged  [x] Fair       [] Poor              Energy:    [x] Normal/Unchanged  [] Increased  [] Decreased        SI [] Present  [x] Absent    HI  []Present  [x] Absent     Aggression:  [] yes  [] no    Patient is [x] able  [] unable to CONTRACT FOR SAFETY     PAST MEDICAL/PSYCHIATRIC HISTORY:   History reviewed. No pertinent past medical history. FAMILY/SOCIAL HISTORY:  History reviewed. No pertinent family history.   Social History     Socioeconomic History    Marital status: Single     Spouse name: Not on file    Number of children: Not on file    Years of education: Not on file    Highest education level: Not on file   Occupational History    Not on file   Tobacco Use    Smoking status: Every Day     Packs/day: 1.00     Types: Cigarettes    Smokeless tobacco: Never   Vaping Use    Vaping Use: Never used   Substance and Sexual Activity    Alcohol use: Yes     Comment: socially    Drug use: Yes     Types: Opiates , Methamphetamines (Crystal Meth), Marijuana (Weed)     Comment: \"poppyseed tea\"    Sexual activity: Not on file   Other Topics Concern    Not on file   Social History Narrative    Not on file     Social Determinants of Health     Financial Resource Strain: Not on file   Food Insecurity: Not on file   Transportation Needs: Not on file   Physical Activity: Not on file   Stress: Not on file   Social Connections: Not on file   Intimate Partner Violence: Not on file Housing Stability: Not on file           ROS:  [x] All negative/unchanged except if checked.  Explain positive(checked items) below:  [] Constitutional  [] Eyes  [] Ear/Nose/Mouth/Throat  [] Respiratory  [] CV  [] GI  []   [] Musculoskeletal  [] Skin/Breast  [] Neurological  [] Endocrine  [] Heme/Lymph  [] Allergic/Immunologic    Explanation:     MEDICATIONS:    Current Facility-Administered Medications:     pill splitter, , Does not apply, Once, Ino Erickson MD    OLANZapine (ZYPREXA) tablet 5 mg, 5 mg, Oral, Nightly, Honey Shelley MD, 5 mg at 08/08/22 2044    divalproex (DEPAKOTE) DR tablet 250 mg, 250 mg, Oral, 3 times per day, Ino Erickson MD, 250 mg at 08/09/22 1403    buprenorphine-naloxone (SUBOXONE) 8-2 MG SL tablet 2 tablet, 2 tablet, SubLINGual, Daily, Ino Erickson MD, 2 tablet at 08/09/22 0851    finasteride (PROSCAR) tablet 1.25 mg, 1.25 mg, Oral, Daily, Ino Erickson MD, 1.25 mg at 08/09/22 0850    fluticasone (FLONASE) 50 MCG/ACT nasal spray 1 spray, 1 spray, Each Nostril, Nightly PRN, Ino Erickson MD    ketoconazole (NIZORAL) 2 % shampoo 1 Applicatorful, 1 Applicatorful, Topical, Daily PRN, Ino Erickson MD, 1 Applicatorful at 69/41/46 0921    cetirizine (ZYRTEC) tablet 10 mg, 10 mg, Oral, Daily, Ino Erickson MD, 10 mg at 08/09/22 0850    therapeutic multivitamin-minerals 1 tablet, 1 tablet, Oral, Nightly, Ino Erickson MD, 1 tablet at 08/08/22 2044    haloperidol lactate (HALDOL) injection 5 mg, 5 mg, IntraMUSCular, Q6H PRN **OR** haloperidol (HALDOL) tablet 5 mg, 5 mg, Oral, Q6H PRN, Ino Erickson MD    hydrOXYzine (VISTARIL) injection 50 mg, 50 mg, IntraMUSCular, Q6H PRN **OR** hydrOXYzine pamoate (VISTARIL) capsule 50 mg, 50 mg, Oral, Q6H PRN, Ino Erickson MD, 50 mg at 08/03/22 0912    thiamine tablet 100 mg, 100 mg, Oral, Daily, Ino Erickson MD, 100 mg at 08/09/22 0851    nicotine polacrilex (COMMIT) lozenge 2 mg, 2 mg, Oral, Q1H PRN, Sherry Diaz MD    acetaminophen (TYLENOL) tablet 650 mg, 650 mg, Oral, Q4H PRN, Sherry Diaz MD    magnesium hydroxide (MILK OF MAGNESIA) 400 MG/5ML suspension 30 mL, 30 mL, Oral, Daily PRN, Sherry Diaz MD    aluminum & magnesium hydroxide-simethicone (MAALOX) 200-200-20 MG/5ML suspension 30 mL, 30 mL, Oral, PRN, Sherry Diaz MD    benztropine mesylate (COGENTIN) injection 2 mg, 2 mg, IntraMUSCular, BID PRN, Sherry Diaz MD    traZODone (DESYREL) tablet 50 mg, 50 mg, Oral, Nightly PRN, Sherry Diaz MD      Examination:  /73   Pulse 53   Temp 98.6 °F (37 °C) (Oral)   Resp 18   Ht 5' 8\" (1.727 m)   Wt 185 lb (83.9 kg)   SpO2 97%   BMI 28.13 kg/m²   Gait - steady  Medication side effects(SE): no    Mental Status Examination:    Level of consciousness:  within normal limits   Appearance:  good hygiene  Behavior/Motor:  less psychomotor retardation  Attitude toward examiner:  attentive  Speech:  slow   Mood: dysthymic  Affect:  anxious  Thought processes:  slow   Thought content:  social anxiety, less paranoid thoughts   Cognition:  oriented to person, place, and time   Concentration better  Insight better   Judgement better     ASSESSMENT:   Patient symptoms are:  [] Well controlled  [x] Improving  [] Worsening  [] No change      Diagnosis:   Principal Problem:    Bipolar affective disorder, depressed, severe, with psychotic behavior ([facility]  Resolved Problems:    * No resolved hospital problems. *      LABS:    No results for input(s): WBC, HGB, PLT in the last 72 hours. No results for input(s): NA, K, CL, CO2, BUN, CREATININE, GLUCOSE in the last 72 hours. No results for input(s): BILITOT, ALKPHOS, AST, ALT in the last 72 hours.     Lab Results   Component Value Date/Time    LABAMPH Neg 08/02/2022 11:45 AM    BARBSCNU Neg 08/02/2022 11:45 AM    LABBENZ Neg 08/02/2022 11:45 AM    LABMETH Neg 08/02/2022 11:45 AM    OPIATESCREENURINE Neg 08/02/2022 11:45 AM PHENCYCLIDINESCREENURINE Neg 08/02/2022 11:45 AM    ETOH <10 08/02/2022 11:56 AM     Lab Results   Component Value Date/Time    TSH 0.799 08/02/2022 11:55 AM     No results found for: LITHIUM  No results found for: VALPROATE, CBMZ      Treatment Plan:  Reviewed current Medications with the patient. Medication as ordered  Risks, benefits, side effects, drug-to-drug interactions and alternatives to treatment were discussed. Collateral information:   CD evaluation  Encourage patient to attend group and other milieu activities.   Discharge planning discussed with the patient and treatment team.    PSYCHOTHERAPY/COUNSELING:  [x] Therapeutic interview  [x] Supportive  [] CBT  [] Ongoing  [] Other    Patient was seen 1:1 for 20 minutes, other than E&M time spent, focusing on      - coping skills techniques     - Anxiety management techniques discussed including deep breathing exercise and PMR     - discussing patients strength and weakness      - Focusing on negative cognition and maladaptive thoughts, which is feeding and maintaining the depression symptoms        [x] Patient continues to need, on a daily basis, active treatment furnished directly by or requiring the supervision of inpatient psychiatric personnel      Anticipated Length of stay: 1-2 days            Electronically signed by Franklyn Robertson MD on 8/9/2022 at 3:49 PM

## 2022-08-09 NOTE — CARE COORDINATION
FAMILY COLLATERAL NOTE    Family/Support Name: sandy  Contact #: [phone]  Relationship to Pt[de-identified]  mom        Family/Support contact aware of hospitalization: yes     Top 3 Life Stressors:   Living situation has been \"influx\"f or a long time, finally settled 7/1/22. Finances- pt is on ssi/ awarded that in April 2022     Background History Relevant to Current Hospitalization:  [facility] through [facility] for [facility]. Reports history of past hospitalizations starting in 2017. Reports his symptoms start with paranoia and psychosis. Reports this admission he was very agitated , hypermobile and highly distractible. Reports pt will become easily frustrated. Pt was not sleeping for 4 days per mom's report. Mom reports this episode nothing \"close to what I have experienced with him before. \" Reports past history of multiple admissions. Reports recent relapses on amphetamines. Reports pt cannot have any stimulants or it increases the psychosis. 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 while at [employer]. Reports pt works with dr. Bertha Donnelly through [facility], reports has a great relationship with him. Psychlogist dr. Lance downing in [facility] have been working with her for 6 months. [facility] assigned  provided him a  and they called every Friday for 6 months and pt did very very well. Has completed outpatient [facility] services daily mon-Thursday php and Dr. Mee Medellin wanted him to complete an NNAMDI IOP. Mom is being graduated to palliative care in the next two weeks. Has been on hospice for 2 years. \"  I will never be cured but I can try to get better. This is good news. \"     Reports patient cannot be on zyrtec, reports this causes him not to be able to pee. Reports he needs claritin. Mom is wanting patient to be taken off  the zytec. Last admission at [facility], he was not restarted on clorazil. He was restarted on zyprexa. Standing appointments every 2 weeks with psychologist  Gloria Garcia of seeing Dr. Tawana Yarbrough depends on pt's stability. MD is able to reach out to be squeezed in. Wyatt Cheng [phone] ext [phone], [facility] case manger, peer support name was sotero. Reports this helped him a lot. Reports he has tired with hitesh bach and his recent signature health  \"has fallen through the cracks. \"     Writer called Fili Gary from [facility] and left a voicemail requesting more resources to  be provided to patient. Family Mental Health/Substance Use History:   Both sides. Maternal: bipolar, schizophrenia. Mom has been treated for MDD. Paternal: MDD, suicide, alcoholism. 2 paternal uncles have completed suicide. Dad is diagnosed with personality disorders       Support Network's Goal for Hospitalization: \"for him to get more support. \"    Discharge Plan: discharge home with mom and get follow up services       Support Network Supportive of Discharge Plan:  in agreement       Support can confirm Safety of Location and Security of Weapons: no firearms       Support agreeable to Safeguard and Monitor Medications (including Prescription and OTC): mom is agreeable to help with moms. Patient in the past has struggled with compliance. Mom is willing help but is limited in how much she can do.      Identified Barriers to Compliance with Discharge Plan: mom on hospice, history of substance use     Recommendations for Support Network:  be available for follow up calls         MARK Stevenson

## 2022-08-09 NOTE — GROUP NOTE
Group Therapy Note    Date: 8/9/2022    Group Start Time: 1100  Group End Time: 1130  Group Topic: Psychotherapy    [facility] COLTON Aponte, [facility]        Group Therapy Note    Attendees: 12       Patient's Goal: discuss sleep hygiene    Notes:  patient participated    Status After Intervention:  Improved    Participation Level: Interactive    Participation Quality: Appropriate      Speech:  normal      Thought Process/Content: Logical      Affective Functioning: Congruent      Mood: anxious      Level of consciousness:  Alert      Response to Learning: Progressing to goal      Endings: None Reported    Modes of Intervention: Support      Discipline Responsible: /Counselor      Signature:  Ubaldo Aponte, [facility]

## 2022-08-09 NOTE — GROUP NOTE
Group Therapy Note    Date: 8/9/2022    Group Start Time: 1000  Group End Time: 1050  Group Topic: Psychoeducation    [facility]    Vern Goldstein        Group Therapy Note    Attendees: 13       Patient's Goal:   \"To attend all the groups and stay positive\"    Notes:  Patient attended the 1000 skills group. Patient was quiet, calm and he worked fairly well on his task.     Status After Intervention:  Unchanged    Participation Level: Fairly well    Participation Quality: Appropriate      Speech:  quiet      Thought Process/Content: Linear      Affective Functioning: Flat      Mood:  calm      Level of consciousness:  Alert      Response to Learning: Progressing to goal      Endings: None Reported    Modes of Intervention: Education, Socialization, and Activity      Discipline Responsible: Psychoeducational Specialist      Signature:  Vern Goldstein

## 2022-08-09 NOTE — GROUP NOTE
Group Therapy Note    Date: 8/9/2022    Group Start Time: 1300  Group End Time: 1400  Group Topic: Healthy Living/Wellness    [facility] 3W COLTON Kurtz        Group Therapy Note    Attendees: 10       Patient's Goal:  Patient's goal is to attend group.      Notes:  Patient was minimal in group discussion     Status After Intervention:  Unchanged    Participation Level: Minimal    Participation Quality: Appropriate and Attentive      Speech:  normal      Thought Process/Content: Logical      Affective Functioning: Congruent      Mood: euthymic      Level of consciousness:  Alert and Attentive      Response to Learning: Progressing to goal      Endings: None Reported    Modes of Intervention: Activity      Discipline Responsible: [address], [facility] [employer]      Signature:  Madalyn Obrien

## 2022-08-10 VITALS
SYSTOLIC BLOOD PRESSURE: 115 MMHG | RESPIRATION RATE: 18 BRPM | TEMPERATURE: 98.1 F | HEART RATE: 53 BPM | DIASTOLIC BLOOD PRESSURE: 69 MMHG | HEIGHT: 68 IN | OXYGEN SATURATION: 98 % | WEIGHT: 185 LBS | BODY MASS INDEX: 28.04 KG/M2

## 2022-08-10 PROCEDURE — 99239 HOSP IP/OBS DSCHRG MGMT >30: CPT | Performed by: PSYCHIATRY & NEUROLOGY

## 2022-08-10 PROCEDURE — 6360000002 HC RX W HCPCS: Performed by: PSYCHIATRY & NEUROLOGY

## 2022-08-10 PROCEDURE — 6370000000 HC RX 637 (ALT 250 FOR IP): Performed by: PSYCHIATRY & NEUROLOGY

## 2022-08-10 RX ORDER — DIVALPROEX SODIUM 250 MG/1
250 TABLET, DELAYED RELEASE ORAL EVERY 8 HOURS SCHEDULED
Qty: 45 TABLET | Refills: 3 | Status: SHIPPED | OUTPATIENT
Start: 2022-08-10 | End: 2022-09-10

## 2022-08-10 RX ADMIN — Medication 100 MG: at 08:53

## 2022-08-10 RX ADMIN — BUPRENORPHINE HYDROCHLORIDE AND NALOXONE HYDROCHLORIDE DIHYDRATE 2 TABLET: 8; 2 TABLET SUBLINGUAL at 08:53

## 2022-08-10 RX ADMIN — FINASTERIDE 1.25 MG: 5 TABLET, FILM COATED ORAL at 08:53

## 2022-08-10 RX ADMIN — DIVALPROEX SODIUM 250 MG: 250 TABLET, DELAYED RELEASE ORAL at 06:16

## 2022-08-10 NOTE — GROUP NOTE
Group Therapy Note    Date: 8/10/2022    Group Start Time: 7097  Group End Time: 36  Group Topic: Healthy Living/Wellness    [facility]    Talat Giordano RN        Group Therapy Note    Attendees: 10/19       Patient's Goal:  discuss stress management    Notes:  appropriate    Status After Intervention:  Improved    Participation Level:  Active Listener    Participation Quality: Appropriate      Speech:  normal      Thought Process/Content: Logical      Affective Functioning: Congruent      Mood: euthymic      Level of consciousness:  Alert, Oriented x4, and Attentive      Response to Learning: Progressing to goal      Endings: None Reported    Modes of Intervention: Education, Support, and Socialization      Discipline Responsible: Registered Nurse      Signature:  Talat Giordano RN

## 2022-08-10 NOTE — DISCHARGE INSTR - DIET

## 2022-08-10 NOTE — DISCHARGE SUMMARY
DISCHARGE SUMMARY      Patient ID:  Wen Salvador  [id number]  44 y.o.  1985      Admit date: 8/2/2022    Discharge date and time: 8/10/2022    Admitting Physician: Malu Borges MD     Discharge Physician: Dr Francia Isbell MD    Admission Diagnoses: Schizoaffective disorder, depressive type ([facility] [F25.1]    Admission Condition: poor    Discharged Condition: stable    Admission Circumstance:        ER report: Erik Clifton was brought in by EMS on a pink slip from [facility] for being manic in the context of not sleeping for 4 days and also a concern for safety as his mother states he made suicidal statements. Upon assessment, patient is tangential and requires redirection to stay on topic. He denies feeling suicidal, homicidal, or having hallucinations. He admits to recent admission at [facility] and states that he does not need to be admitted today. He also states \"I've had delusions and hallucinations before I don't have any of that today, I'm very familiar\". Erik Clifton admits to poor sleep stating he has only slept 2 hours each night since he was discharged from [facility], but reports sleeping well while in the hospital. Other symptoms include feeling distracted, and having racing thoughts. He minimizes the events leading up to this encounter and states it was due to his mother \"nit picking\" at him after he spilled milk which caused a fight. He states during the verbal altercation with his mother, she got mad and \"knows how to lock me up\". During interview:     38y/o, single,live with mom, unemployed with H/O Bipolar disorder  Pt was in a argument with mom who called the  and EMS  Pt denies making suicidal threats which his mom claimed that he was.  Pt admit to taking modafanil 1-2 pill per day for past 4 days - purchased online-  Pt has not been sleeping and manic  Pt was admitted to [facility] [address] [place] [address] service for same reason  Discharged with zyprexa, ativan and suboxone on 7/28  Was feeling too tired from zyprexa and so was using Modafanil     PSYCHIATRIC REVIEW OF SYMPTOMS:  Depression: + Depressed mood, + Sleep disturbance and + Decreased energy +irritability with no suicidal thoughts, intent or plan  Syeda: Decrease need for sleep, More talkative and Irritable mood  Psychosis: Denies any auditory / visual hallucination or paranoid ideation. ANA: Difficulty controlling worry, Restless / \"Keyed up\", Fatigued, Irritable and Sleep disturbance  OCD: Denies any symptoms of OCD. PTSD: Denies any PTSD symptoms. PAST PSYCHIATRIC HISTORY:   PSYCHIATRIC HISTORY:   Prior Diagnosis: Schizoaffective disorder vs. Bipolar disorder, opioid use d/o, stimulant use d/o  Current Psychiatrist: Dr. Nickolas Dimas  Current Therapist: n/a  Total Previous Hospitalizations: 5   Last Hospitalization: //2022  History of Suicide Attempts:   - Total: 0  - Methods: n/a   - Last Attempt: n/a  Previous Psychiatric Medication Trials:  Zoloft, Latuda, Trazodone, Wellbutrin, Latuda, Suboxone, Adderall, clonidine, Atarax, Viibryd, Abilify  Current Psychiatric Medications:   Suboxone 16 mg daily - filled 7/13/22  Clozapine 75 mg daily - filled 7/12/22  Lexapro 5 mg daily- filled 7/9/22  Ativan 0.75 TID - filled 6/28/22  Clonazepam 0.5mg - filled 6/30/22      PAST MEDICAL/PSYCHIATRIC HISTORY:   History reviewed. No pertinent past medical history. FAMILY/SOCIAL HISTORY:  History reviewed. No pertinent family history.   Social History     Socioeconomic History    Marital status: Single     Spouse name: Not on file    Number of children: Not on file    Years of education: Not on file    Highest education level: Not on file   Occupational History    Not on file   Tobacco Use    Smoking status: Every Day     Packs/day: 1.00     Types: Cigarettes    Smokeless tobacco: Never   Vaping Use    Vaping Use: Never used   Substance and Sexual Activity    Alcohol use: Yes     Comment: socially    Drug use: Yes     Types: Opiates , Methamphetamines (Crystal Meth), Marijuana (Weed)     Comment: \"poppyseed tea\"    Sexual activity: Not on file   Other Topics Concern    Not on file   Social History Narrative    Not on file     Social Determinants of Health     Financial Resource Strain: Not on file   Food Insecurity: Not on file   Transportation Needs: Not on file   Physical Activity: Not on file   Stress: Not on file   Social Connections: Not on file   Intimate Partner Violence: Not on file   Housing Stability: Not on file       MEDICATIONS:    Current Facility-Administered Medications:     pill splitter, , Does not apply, Once, Douglas Blanco MD    OLANZapine (ZYPREXA) tablet 5 mg, 5 mg, Oral, Nightly, Elvin Ray MD, 5 mg at 08/09/22 2100    divalproex (DEPAKOTE) DR tablet 250 mg, 250 mg, Oral, 3 times per day, Douglas Blanco MD, 250 mg at 08/10/22 0616    buprenorphine-naloxone (SUBOXONE) 8-2 MG SL tablet 2 tablet, 2 tablet, SubLINGual, Daily, Douglas Blanco MD, 2 tablet at 08/10/22 0853    finasteride (PROSCAR) tablet 1.25 mg, 1.25 mg, Oral, Daily, Douglas Blanco MD, 1.25 mg at 08/10/22 0853    fluticasone (FLONASE) 50 MCG/ACT nasal spray 1 spray, 1 spray, Each Nostril, Nightly PRN, Douglas Blanco MD    ketoconazole (NIZORAL) 2 % shampoo 1 Applicatorful, 1 Applicatorful, Topical, Daily PRN, Douglas Blanco MD, 1 Applicatorful at 85/00/73 0921    cetirizine (ZYRTEC) tablet 10 mg, 10 mg, Oral, Daily, Douglas Blanco MD, 10 mg at 08/09/22 0850    therapeutic multivitamin-minerals 1 tablet, 1 tablet, Oral, Nightly, Douglas Blanco MD, 1 tablet at 08/09/22 2100    haloperidol lactate (HALDOL) injection 5 mg, 5 mg, IntraMUSCular, Q6H PRN **OR** haloperidol (HALDOL) tablet 5 mg, 5 mg, Oral, Q6H PRN, Douglas Blanco MD    hydrOXYzine (VISTARIL) injection 50 mg, 50 mg, IntraMUSCular, Q6H PRN **OR** hydrOXYzine pamoate (VISTARIL) capsule 50 mg, 50 mg, Oral, Q6H PRN, Douglas Blanco MD, 50 mg at 08/03/22 0912    thiamine tablet 100 mg, 100 mg, Oral, Daily, Douglas Blanco MD, 100 mg at 08/10/22 6388    nicotine polacrilex (COMMIT) lozenge 2 mg, 2 mg, Oral, Q1H PRN, Douglas Blanco MD    acetaminophen (TYLENOL) tablet 650 mg, 650 mg, Oral, Q4H PRN, Douglas Blanco MD    magnesium hydroxide (MILK OF MAGNESIA) 400 MG/5ML suspension 30 mL, 30 mL, Oral, Daily PRN, Douglas Blanco MD    aluminum & magnesium hydroxide-simethicone (MAALOX) 200-200-20 MG/5ML suspension 30 mL, 30 mL, Oral, PRN, Douglas Blanco MD    benztropine mesylate (COGENTIN) injection 2 mg, 2 mg, IntraMUSCular, BID PRN, Douglas Blanco MD    traZODone (DESYREL) tablet 50 mg, 50 mg, Oral, Nightly PRN, Douglas Blanco MD    Examination:  /69   Pulse 53   Temp 98.1 °F (36.7 °C)   Resp 18   Ht 5' 8\" (1.727 m)   Wt 185 lb (83.9 kg)   SpO2 98%   BMI 28.13 kg/m²   Gait - steady    HOSPITAL COURSE[de-identified]  Following admission to the hospital, patient had a complete physical exam and blood work up  Patient was monitored closely with suicide precaution  Patient was started on meds as listed below  Was encouraged to participate in group and other milieu activity  Patient started to feel better with this combination of treatment. Significant progress in the symptoms since admission. Mood better, with the score of 2/10 - bad  No AVH or paranoid thoughts  No Hopeless or worthless feeling  No active SI/HI  Appetite:  [x] Normal  [] Increased  [] Decreased    Sleep:       [x] Normal  [] Fair       [] Poor            Energy:    [x] Normal  [] Increased  [] Decreased     SI [] Present  [x] Absent  HI  []Present  [x] Absent   Aggression:  [] yes  [] no  Patient is [x] able  [] unable to CONTRACT FOR SAFETY   Medication side effects(SE):  [x] None(Psych.  Meds.) [] Other      Mental Status Examination on discharge:    Level of consciousness:  within normal limits   Appearance:  well-appearing  Behavior/Motor:  no abnormalities noted  Attitude toward examiner:  attentive and good eye contact  Speech:  spontaneous, normal rate and normal volume   Mood: euthymic  Affect:  blunted  Thought processes:  linear   Thought content:  Suicidal Ideation:  denies suicidal ideation  Delusions:  no evidence of delusions  Perceptual Disturbance:  denies any perceptual disturbance  Cognition:  oriented to person, place, and time   Concentration intact  Memory intact  Insight good   Judgement fair   Fund of Knowledge adequate      ASSESSMENT:  Patient symptoms are:  [x] Well controlled  [x] Improving  [] Worsening  [] No change      Diagnosis:  Principal Problem:    Bipolar affective disorder, depressed, severe, with psychotic behavior ([facility]  Resolved Problems:    * No resolved hospital problems. *      LABS:    No results for input(s): WBC, HGB, PLT in the last 72 hours. No results for input(s): NA, K, CL, CO2, BUN, CREATININE, GLUCOSE in the last 72 hours. No results for input(s): BILITOT, ALKPHOS, AST, ALT in the last 72 hours. Lab Results   Component Value Date/Time    LABAMPH Neg 08/02/2022 11:45 AM    BARBSCNU Neg 08/02/2022 11:45 AM    LABBENZ Neg 08/02/2022 11:45 AM    LABMETH Neg 08/02/2022 11:45 AM    OPIATESCREENURINE Neg 08/02/2022 11:45 AM    PHENCYCLIDINESCREENURINE Neg 08/02/2022 11:45 AM    ETOH <10 08/02/2022 11:56 AM     Lab Results   Component Value Date/Time    TSH 0.799 08/02/2022 11:55 AM     No results found for: LITHIUM  No results found for: VALPROATE, CBMZ    RISK ASSESSMENT AT DISCHARGE: Low risk for suicide and homicide. Treatment Plan:  Reviewed current Medications with the patient. Education provided on the complaince with treatment. Risks, benefits, side effects, drug-to-drug interactions and alternatives to treatment were discussed. Encourage patient to attend outpatient follow up appointment and therapy. Patient was advised to call the outpatient provider, visit the nearest ED or call 911 if symptoms are not manageable.      Patient's family member was contacted prior to the discharge. Medication List        START taking these medications      divalproex 250 MG DR tablet  Commonly known as: DEPAKOTE  Take 1 tablet by mouth in the morning and 1 tablet at noon and 1 tablet before bedtime.             CONTINUE taking these medications      Claritin 10 MG capsule  Generic drug: loratadine     finasteride 5 MG tablet  Commonly known as: PROSCAR     fluticasone 50 MCG/ACT nasal spray  Commonly known as: FLONASE     ketoconazole 2 % shampoo  Commonly known as: NIZORAL     OLANZapine 5 MG tablet  Commonly known as: ZYPREXA     SUBOXONE SL     therapeutic multivitamin-minerals tablet            STOP taking these medications      LORazepam 0.5 MG tablet  Commonly known as: ATIVAN               Where to Get Your Medications        These medications were sent to [address], [address] [place] [address]      Phone: [phone]   divalproex 250 MG DR tablet           Reason for more than one antipsychotic:   [x] N/A  [] 3 failed monotherapy(drugs tried):  [] Cross over to a new antipsychotic  [] Taper to monotherapy from polypharmacy  [] Augmentation of Clozapine therapy due to treatment resistance to single therapy        TIME SPEND - 35 MINUTES TO COMPLETE THE EVALUATION, DISCHARGE SUMMARY, MEDICATION RECONCILIATION AND FOLLOW UP CARE     Signed:  Carissa Norton MD  8/10/2022  9:41 AM

## 2022-08-10 NOTE — PROGRESS NOTES
Morning Community Meeting Topics    Babs Singh attended the morning community meeting on 8/10/22. Topics discussed today     [x] Introduction  Day of the week and date  Mask distribution  Current mask requirements  [x]Teams  Explanation of  Green and Blue team criteria  Nurses assigned to each team for today  Explanation about green and blue paper  Date  Patient's Name  Patient's Nurse  Goals  [x] Visitation  Announce the visiting hours for the day  Announce which team is allowed to have visitors for the day  Review any updated Covid 19 requirements for visitors during visitation  Vaccine Card or negative Covid test within 48 hours of visit  State Identification  Patients are reminded to alert the  at least 1 hour before visitation   [x] Unit Orientation  Coffee use  Phone location and etiquette  Shower locations  [place] and dryer location and process  Common area expectations  Staff rounds expectation  [x] Meals   Educate patient to the menu  The patient is encouraged to fill out the menu to get preferences at mealtime  The patient is educated that if they do not fill out the menu, they will get the standard tray  The coffee pot is decaf, patient encouraged to order regular coffee from menu.   Educate patient to the meal process  Patient encouraged to eat snacks provided twice daily  Snacks may stay in patient room     [x] Discharge Process  Discharge expectations  Fill out the survey after discharge   [x] Hygiene  Daily showers encouraged  Showers availability discussed   Daily dressing encouraged  Discussed wearing street clothing  Education provided on where to place linens and clothing  Linens in the hamper  personal clothing does not go into the linen hamper  [x] Group   Patient encouraged to attend group provided  Time of Group Meetings discussed  Gentle reminder that attendance is a Physician order  [x] Movement  Chair exercises completed  Stretching completed  Notes: Goal - \"To go home\" Electronically signed by Erlinda Mcgill, [address] on 8/10/2022 at 11:40 AM

## 2022-08-10 NOTE — DISCHARGE INSTRUCTIONS
Keep all follow up appointments, take medications as ordered, utilize positive supports, abstain from use of alcohol and drugs. If symptoms return or you feel at risk to yourself or others, please call 911, return the nearest emergency room, or call your local crisis hotline:  [facility]: [phone]) [phone]: 1(149) [address]: [phone]     Due to the [address] [facility]oking [facility] is not currently available. For assistance with quitting smoking please go to https://smokefree.gov. A prescription for an FDA-approved tobacco cessation medication was offered at discharge and the patient refused. Someone from [address] [place] [address] [facility] will be calling you tomorrow to follow up on your care. If you don't hear from us, give us a call! [phone].

## 2022-08-10 NOTE — GROUP NOTE
Group Therapy Note    Date: 8/9/2022    Group Start Time: 2030  Group End Time: 2045  Group Topic: Wrap-Up    [facility]    Elizabeth Ngo RN        Group Therapy Note    Attendees: 12       Patient's Goal:  To go to groups    Notes:  Progressing towards goal    Status After Intervention:  Unchanged    Participation Level:  Active Listener    Participation Quality: Appropriate      Speech:  normal      Thought Process/Content: Logical      Affective Functioning: Congruent      Mood: euthymic      Level of consciousness:  Alert      Response to Learning: Progressing to goal      Endings: None Reported    Modes of Intervention: Support      Discipline Responsible: Registered Nurse      Signature:  Elizabeth Ngo RN

## 2022-08-10 NOTE — CARE COORDINATION
Family/Support Name: Mookie Pete #: [phone]  Relationship to Patient: mom     Placed call to above for collateral information,    Response: spoke with pt's mom. Informed her that pt is discharging today. Discussed follow up appointments, pt being transported via taxi and his meds being filled at [facility] outpatient [place] [address] services. Mom is in agreement with pt being discharged. Informed mom that [facility]  was contacted by Talia Bundy and left voicemail requesting  and further services to assist patient.  Electronically signed by MARK Michel on 8/10/2022 at 9:27 AM

## 2022-08-10 NOTE — FLOWSHEET NOTE
Reviewed discharge instructions with patient. Pt. Verbalized understanding. Denies SI/HI/AVH. Ambulatory off unit to cab to go home.

## 2022-08-10 NOTE — PROGRESS NOTES
CLINICAL PHARMACY NOTE: MEDS TO BEDS    Total # of Prescriptions Filled: 1   The following medications were delivered to the patient:  Divalproex 250mg tab     Additional Documentation:

## 2022-08-10 NOTE — GROUP NOTE
Group Therapy Note    Date: 8/9/2022    Group Start Time: 2000  Group End Time: 2030  Group Topic: Recreational    [facility]    Jhon Howell RN        Group Therapy Note    Attendees: 12       Patient's Goal:  To attend group    Notes:  Good participation    Status After Intervention:  Unchanged    Participation Level:  Active Listener    Participation Quality: Appropriate      Speech:  normal      Thought Process/Content: Logical      Affective Functioning: Congruent      Mood: euthymic      Level of consciousness:  Alert      Response to Learning: Progressing to goal      Endings: None Reported    Modes of Intervention: Support      Discipline Responsible: Psychologist      Signature:  Jhon Howell RN

## 2022-09-09 ENCOUNTER — HOSPITAL ENCOUNTER (EMERGENCY)
Age: 37
Discharge: PSYCHIATRIC OR SUBSTANCE USE FACILITY | End: 2022-09-10
Payer: MEDICAID

## 2022-09-09 DIAGNOSIS — F32.A DEPRESSION WITH SUICIDAL IDEATION: Primary | ICD-10-CM

## 2022-09-09 DIAGNOSIS — R45.851 DEPRESSION WITH SUICIDAL IDEATION: Primary | ICD-10-CM

## 2022-09-09 LAB
AMPHETAMINE SCREEN, URINE: NORMAL
BARBITURATE SCREEN URINE: NORMAL
BASOPHILS ABSOLUTE: 0 K/UL (ref 0–0.2)
BASOPHILS RELATIVE PERCENT: 0.4 %
BENZODIAZEPINE SCREEN, URINE: NORMAL
BILIRUBIN URINE: NEGATIVE
BLOOD, URINE: NEGATIVE
CANNABINOID SCREEN URINE: NORMAL
CLARITY: CLEAR
COCAINE METABOLITE SCREEN URINE: NORMAL
COLOR: YELLOW
EOSINOPHILS ABSOLUTE: 0.4 K/UL (ref 0–0.7)
EOSINOPHILS RELATIVE PERCENT: 4 %
FENTANYL SCREEN, URINE: NORMAL
GLUCOSE URINE: NEGATIVE MG/DL
HCT VFR BLD CALC: 41.2 % (ref 42–52)
HEMOGLOBIN: 14.1 G/DL (ref 14–18)
KETONES, URINE: NEGATIVE MG/DL
LEUKOCYTE ESTERASE, URINE: NEGATIVE
LYMPHOCYTES ABSOLUTE: 2.4 K/UL (ref 1–4.8)
LYMPHOCYTES RELATIVE PERCENT: 21.9 %
Lab: NORMAL
MCH RBC QN AUTO: 31.6 PG (ref 27–31.3)
MCHC RBC AUTO-ENTMCNC: 34.2 % (ref 33–37)
MCV RBC AUTO: 92.3 FL (ref 80–100)
METHADONE SCREEN, URINE: NORMAL
MONOCYTES ABSOLUTE: 0.7 K/UL (ref 0.2–0.8)
MONOCYTES RELATIVE PERCENT: 6.5 %
NEUTROPHILS ABSOLUTE: 7.4 K/UL (ref 1.4–6.5)
NEUTROPHILS RELATIVE PERCENT: 67.2 %
NITRITE, URINE: NEGATIVE
OPIATE SCREEN URINE: NORMAL
OXYCODONE URINE: NORMAL
PDW BLD-RTO: 12.3 % (ref 11.5–14.5)
PH UA: 6.5 (ref 5–9)
PHENCYCLIDINE SCREEN URINE: NORMAL
PLATELET # BLD: 283 K/UL (ref 130–400)
PROPOXYPHENE SCREEN: NORMAL
PROTEIN UA: NEGATIVE MG/DL
RBC # BLD: 4.46 M/UL (ref 4.7–6.1)
SARS-COV-2, NAAT: NOT DETECTED
SPECIFIC GRAVITY UA: 1.01 (ref 1–1.03)
URINE REFLEX TO CULTURE: NORMAL
UROBILINOGEN, URINE: 0.2 E.U./DL
WBC # BLD: 11 K/UL (ref 4.8–10.8)

## 2022-09-09 PROCEDURE — 80061 LIPID PANEL: CPT

## 2022-09-09 PROCEDURE — 81003 URINALYSIS AUTO W/O SCOPE: CPT

## 2022-09-09 PROCEDURE — 80143 DRUG ASSAY ACETAMINOPHEN: CPT

## 2022-09-09 PROCEDURE — 85025 COMPLETE CBC W/AUTO DIFF WBC: CPT

## 2022-09-09 PROCEDURE — 82550 ASSAY OF CK (CPK): CPT

## 2022-09-09 PROCEDURE — 80053 COMPREHEN METABOLIC PANEL: CPT

## 2022-09-09 PROCEDURE — 80179 DRUG ASSAY SALICYLATE: CPT

## 2022-09-09 PROCEDURE — 87635 SARS-COV-2 COVID-19 AMP PRB: CPT

## 2022-09-09 PROCEDURE — 80307 DRUG TEST PRSMV CHEM ANLYZR: CPT

## 2022-09-09 PROCEDURE — 99285 EMERGENCY DEPT VISIT HI MDM: CPT

## 2022-09-09 PROCEDURE — 36415 COLL VENOUS BLD VENIPUNCTURE: CPT

## 2022-09-09 PROCEDURE — 84443 ASSAY THYROID STIM HORMONE: CPT

## 2022-09-09 PROCEDURE — 82077 ASSAY SPEC XCP UR&BREATH IA: CPT

## 2022-09-09 ASSESSMENT — ENCOUNTER SYMPTOMS
ABDOMINAL PAIN: 0
APNEA: 0
ALLERGIC/IMMUNOLOGIC NEGATIVE: 1
EYE PAIN: 0
TROUBLE SWALLOWING: 0
SHORTNESS OF BREATH: 0
COLOR CHANGE: 0

## 2022-09-09 ASSESSMENT — PAIN - FUNCTIONAL ASSESSMENT: PAIN_FUNCTIONAL_ASSESSMENT: NONE - DENIES PAIN

## 2022-09-10 VITALS
HEIGHT: 68 IN | OXYGEN SATURATION: 100 % | BODY MASS INDEX: 26.52 KG/M2 | RESPIRATION RATE: 16 BRPM | SYSTOLIC BLOOD PRESSURE: 117 MMHG | DIASTOLIC BLOOD PRESSURE: 75 MMHG | HEART RATE: 68 BPM | WEIGHT: 175 LBS | TEMPERATURE: 98 F

## 2022-09-10 LAB
ACETAMINOPHEN LEVEL: <5 UG/ML (ref 10–30)
ALBUMIN SERPL-MCNC: 4.5 G/DL (ref 3.5–4.6)
ALP BLD-CCNC: 102 U/L (ref 35–104)
ALT SERPL-CCNC: 14 U/L (ref 0–41)
ANION GAP SERPL CALCULATED.3IONS-SCNC: 11 MEQ/L (ref 9–15)
AST SERPL-CCNC: 16 U/L (ref 0–40)
BILIRUB SERPL-MCNC: <0.2 MG/DL (ref 0.2–0.7)
BUN BLDV-MCNC: 8 MG/DL (ref 6–20)
CALCIUM SERPL-MCNC: 8.9 MG/DL (ref 8.5–9.9)
CHLORIDE BLD-SCNC: 100 MEQ/L (ref 95–107)
CHOLESTEROL, TOTAL: 175 MG/DL (ref 0–199)
CO2: 26 MEQ/L (ref 20–31)
CREAT SERPL-MCNC: 0.59 MG/DL (ref 0.7–1.2)
ETHANOL PERCENT: NORMAL G/DL
ETHANOL: <10 MG/DL (ref 0–0.08)
GFR AFRICAN AMERICAN: >60
GFR NON-AFRICAN AMERICAN: >60
GLOBULIN: 2.7 G/DL (ref 2.3–3.5)
GLUCOSE BLD-MCNC: 130 MG/DL (ref 70–99)
HDLC SERPL-MCNC: 34 MG/DL (ref 40–59)
LDL CHOLESTEROL CALCULATED: 90 MG/DL (ref 0–129)
POTASSIUM SERPL-SCNC: 3.8 MEQ/L (ref 3.4–4.9)
SALICYLATE, SERUM: <0.3 MG/DL (ref 15–30)
SODIUM BLD-SCNC: 137 MEQ/L (ref 135–144)
TOTAL CK: 36 U/L (ref 0–190)
TOTAL PROTEIN: 7.2 G/DL (ref 6.3–8)
TRIGL SERPL-MCNC: 254 MG/DL (ref 0–150)
TSH SERPL DL<=0.05 MIU/L-ACNC: 1.55 UIU/ML (ref 0.44–3.86)

## 2022-09-10 RX ORDER — GUANFACINE 1 MG/1
1 TABLET, EXTENDED RELEASE ORAL NIGHTLY
COMMUNITY

## 2022-09-10 RX ORDER — MIRTAZAPINE 15 MG/1
15 TABLET, FILM COATED ORAL NIGHTLY
COMMUNITY

## 2022-09-10 RX ORDER — SERTRALINE HYDROCHLORIDE 100 MG/1
100 TABLET, FILM COATED ORAL DAILY
COMMUNITY

## 2022-09-10 RX ORDER — ZIPRASIDONE HYDROCHLORIDE 60 MG/1
60 CAPSULE ORAL DAILY
COMMUNITY

## 2022-09-10 RX ORDER — BUSPIRONE HYDROCHLORIDE 5 MG/1
5 TABLET ORAL 2 TIMES DAILY
COMMUNITY

## 2022-09-10 NOTE — ED NOTES
Provisional Diagnosis:       Schizoaffective DO    Psychosocial and Contextual Factors:       Lives at home with his mother   Mother is on Hospice  3 hospitalizations over the course of the last 2 months ([address] [place] [address], [facility], and [facility])  Primary Mental Health care is through [facility]. Mother would like him to go to User Replay Summary:  C-SSRS Summary:    Patient: C-SSRS Suicide Screening  1) Within the past month, have you wished you were dead or wished you could go to sleep and not wake up? : Yes  2) Have you actually had any thoughts of killing yourself? : Yes  3) Have you been thinking about how you might kill yourself? : Yes  4) Have you had these thoughts and had some intention of acting on them? : No  5) Have you started to work out or worked out the details of how to kill yourself? Do you intend to carry out this plan? : No  6) Have you ever done anything, started to do anything, or prepared to do anything to end your life?: No  Did this occur within the past 3 months? : No  Risk of Suicide: Moderate Risk       Patient: Calm. Slow to respond and follow direction. Family: None at bedside  Agency: Dr. Wang Ulrich out of [address] [place] [address]. Also has a psychologist Dr. Evonne Nunn, from [facility] also. Has an appointment with Dr. Fortino Echevarria, this Monday. Substance Abuse: Denies    Present Suicidal Behavior:  Denies    Verbal: endorses thoughts of SI with a plan cut his wrist  Attempt: Denies current attempt    Past Suicidal Behavior: Unknown    Self-Injurious/Self-Mutilation: None noted      Violence Current or Past H/o violent outburst in ED and on the unit. Trauma Identified:    Physical Abuse: Denies  Verbal Abuse: Denies  Emotional abuse: Denies  Financial Abuse: Denies  Sexual abuse: Denies      Protective Factors:      Lives with Mother      Risk Factors:     Mother is on Hospice    Clinical Summary:    Patient came into ER via ambulance, after he called stating he needed an ambulance because he was suicidal.  Patient had taken his HS medications and was laying down. After about 45 minutes, he sat up and told his mother that he needed to go back to the hospital because he was having suicidal thoughts. He has been hospitalized at [facility], [facility] and [facility] over the course of the last couple of months. Each admission has come with a different diagnosis and differing medication regiment, per collateral from his mother Teddy Johnson [phone]). According to Lucina Heck, the suicidal thoughts are new to him. He does have psychosis and she believes that he is still currently seeing and hearing something. She reports that he will wave his hands like he is talking to someone. He also suffers from racing thoughts. Tonight he was shaking and couldn't sit still which is not like him. Pricilla Cockayne was last released two days ago and was at [facility] the same night, but since patient was not SI nor HI he was not admitted. Today, he had an appointment and was remaining without SI and HI. However, tonight after taking his HS medications and after about 45 minutes had elapsed he became SI. Current medications per Lucina Heck is:  suboxone 16 mg QAM, Zoloft 100mg QAM, Geodon 60 mg every evening, Remeron 15 mg QHS, Buspar 5 mg BID, Intuniv ER 1 mg QHS all are oral route and pill forms. Patient is calm and cooperative. He has minimal eye contact and does in fact appear internally stimulated. He endorses SI and AVH. HI is denied at this time.           Level of Care Disposition:      Per Dr. Devendra Walls, send patient out per mother's request.      Jody Weiss, RN  09/10/22 69 Charlotte Mccauley, DELL  09/10/22 9529

## 2022-09-10 NOTE — ED NOTES
Patient appears to be  sleeping respirations are even and unlabored no distress noted at this time.        Destiney Alexandra RN  09/10/22 0593

## 2022-09-10 NOTE — ED NOTES
Patient is accepted to [facility] under Dr. Danie Lozano service to the 1500 unit.     Nurse to Nurse to be called to:    [address] Thomas Meraz RN  09/10/22 7640

## 2022-09-10 NOTE — ED NOTES
Awaiting transport to [facility] per report from [facility]. Resting with eyes closed & no acute distress noted.      Kendy Marks RN  09/10/22 7790

## 2022-09-10 NOTE — ED NOTES
Patient appears to be  sleeping respirations are even and unlabored no distress noted at this time.     Patient wakes at sound of voice  Patient cooperative with vitals     Adeola Diaz RN  09/10/22 1978

## 2022-09-10 NOTE — ED NOTES
[facility] here to transport Patient to [facility].      Stacey Mills RN  09/10/22 3152

## 2022-09-10 NOTE — ED NOTES
Report called to Abelardo Corbett @ [address] [place] [address] and accepted.        Travis Matthews RN  09/10/22 7898

## 2022-09-10 NOTE — ED PROVIDER NOTES
[address] [facility] ED  EMERGENCY DEPARTMENT ENCOUNTER      Pt Name: Stephanie Villalobos  MRN: [id number]  [place] 1985  Date of evaluation: 9/9/2022  Provider: Cameron Ta PA-C    CHIEF COMPLAINT       Chief Complaint   Patient presents with    Suicidal     Pt recently released from psych facility and now feels SI after multiple med changes         HISTORY OF PRESENT ILLNESS   (Location/Symptom, Timing/Onset, Context/Setting, Quality, Duration, Modifying Factors, Severity)  Note limiting factors. Stephanie Villalobos is a 44 y.o. male who presents to the emergency department with complaints of depression with suicidal ideation with plan to cut his wrists, ongoing all day today. Patient denies any homicidal ideation. Patient does state occasional auditory and visual hallucinations. He denies any recent illness or injury. Patient states that he was recently released from a psychiatric facility where he had multiple med changes and believes that this is the cause of his suicidal ideation    HPI    Nursing Notes were reviewed. REVIEW OF SYSTEMS    (2-9 systems for level 4, 10 or more for level 5)     Review of Systems   Constitutional:  Negative for diaphoresis and fever. HENT:  Negative for hearing loss and trouble swallowing. Eyes:  Negative for pain. Respiratory:  Negative for apnea and shortness of breath. Cardiovascular:  Negative for chest pain. Gastrointestinal:  Negative for abdominal pain. Endocrine: Negative. Genitourinary:  Negative for hematuria. Musculoskeletal:  Negative for neck pain and neck stiffness. Skin:  Negative for color change. Allergic/Immunologic: Negative. Neurological:  Negative for dizziness and numbness. Hematological: Negative. Psychiatric/Behavioral:  Positive for suicidal ideas. All other systems reviewed and are negative. Except as noted above the remainder of the review of systems was reviewed and negative.        PAST MEDICAL HISTORY   No Ginette Coma Scale  Eye Opening: Spontaneous  Best Verbal Response: Oriented  Best Motor Response: Obeys commands  Ginette Coma Scale Score: 15               PHYSICAL EXAM    (up to 7 for level 4, 8 or more for level 5)     ED Triage Vitals [09/09/22 2239]   BP Temp Temp src Heart Rate Resp SpO2 Height Weight   (!) 142/93 98 °F (36.7 °C) -- (!) 112 18 96 % 5' 8\" (1.727 m) 175 lb (79.4 kg)       Physical Exam  Vitals and nursing note reviewed. Constitutional:       General: He is not in acute distress. Appearance: He is well-developed. He is not diaphoretic. HENT:      Head: Normocephalic and atraumatic. Mouth/Throat:      Mouth: Mucous membranes are moist.      Pharynx: No oropharyngeal exudate. Eyes:      General: No scleral icterus. Conjunctiva/sclera: Conjunctivae normal.      Pupils: Pupils are equal, round, and reactive to light. Neck:      Trachea: No tracheal deviation. Cardiovascular:      Rate and Rhythm: Normal rate. Heart sounds: Normal heart sounds. Pulmonary:      Effort: Pulmonary effort is normal. No respiratory distress. Breath sounds: Normal breath sounds. Abdominal:      General: Bowel sounds are normal. There is no distension. Palpations: Abdomen is soft. Musculoskeletal:         General: Normal range of motion. Cervical back: Normal range of motion and neck supple. No rigidity or tenderness. Skin:     General: Skin is warm and dry. Findings: No erythema or rash. Neurological:      Mental Status: He is alert and oriented to person, place, and time. Cranial Nerves: No cranial nerve deficit. Motor: No abnormal muscle tone. Psychiatric:         Mood and Affect: Mood is depressed. Behavior: Behavior normal.         Thought Content: Thought content includes suicidal ideation. Thought content does not include homicidal ideation. Thought content includes suicidal plan.          Judgment: Judgment normal.       DIAGNOSTIC RESULTS     EKG: All EKG's are interpreted by the Emergency Department Physician who either signs or Co-signs this chart in the absence of a cardiologist.        RADIOLOGY:   Non-plain film images such as CT, Ultrasound and MRI are read by the radiologist. Plain radiographic images are visualized and preliminarily interpreted by the emergency physician with the below findings:        Interpretation per the Radiologist below, if available at the time of this note:    No orders to display         ED BEDSIDE ULTRASOUND:   Performed by ED Physician - none    LABS:  Labs Reviewed   ACETAMINOPHEN LEVEL - Abnormal; Notable for the following components:       Result Value    Acetaminophen Level <5 (*)     All other components within normal limits   CBC WITH AUTO DIFFERENTIAL - Abnormal; Notable for the following components:    WBC 11.0 (*)     RBC 4.46 (*)     Hematocrit 41.2 (*)     MCH 31.6 (*)     Neutrophils Absolute 7.4 (*)     All other components within normal limits   COMPREHENSIVE METABOLIC PANEL - Abnormal; Notable for the following components:    Glucose 130 (*)     Creatinine 0.59 (*)     All other components within normal limits   LIPID PANEL - Abnormal; Notable for the following components:    Triglycerides 254 (*)     HDL 34 (*)     All other components within normal limits   SALICYLATE LEVEL - Abnormal; Notable for the following components:    Salicylate, Serum <6.8 (*)     All other components within normal limits   COVID-19, RAPID   CK   ETHANOL   TSH   URINE DRUG SCREEN   URINALYSIS WITH REFLEX TO CULTURE       All other labs were within normal range or not returned as of this dictation.     EMERGENCY DEPARTMENT COURSE and DIFFERENTIAL DIAGNOSIS/MDM:   Vitals:    Vitals:    09/09/22 2239 09/10/22 0615   BP: (!) 142/93 117/75   Pulse: (!) 112 68   Resp: 18 16   Temp: 98 °F (36.7 °C)    SpO2: 96% 100%   Weight: 175 lb (79.4 kg)    Height: 5' 8\" (1.727 m)            MDM        REASSESSMENT          Patient is medically cleared for psychiatric evaluation and care    CONSULTS:  None    PROCEDURES:  Unless otherwise noted below, none     Procedures        FINAL IMPRESSION      1. Depression with suicidal ideation          DISPOSITION/PLAN   DISPOSITION Decision To Transfer 09/10/2022 07:23:35 AM      PATIENT REFERRED TO:  No follow-up provider specified. DISCHARGE MEDICATIONS:  Discharge Medication List as of 9/10/2022  8:39 AM        Controlled Substances Monitoring:     No flowsheet data found.     (Please note that portions of this note were completed with a voice recognition program.  Efforts were made to edit the dictations but occasionally words are mis-transcribed.)    Raul Bhandari PA-C (electronically signed)  Attending Emergency Physician            Raul Bhandari PA-C  09/10/22 0348

## 2022-09-10 NOTE — ED NOTES
Patient appears to be  sleeping respirations are even and unlabored no distress noted at this time.        Nancy Bartholomew RN  09/10/22 9826

## 2022-09-10 NOTE — ED NOTES
Breakfast tray placed @ bedside. Continues to rest quietly with no acute distress noted.      Fern Delaney RN  09/10/22 [id number]

## 2022-09-10 NOTE — ED NOTES
Patient appears to be  sleeping respirations are even and unlabored no distress noted at this time.        Brain DELL Puente  09/10/22 8601

## 2025-07-30 NOTE — DISCHARGE INSTRUCTIONS
Discharged to home with plan to stop amphetamine use follow-up with your outpatient therapist continue with outpatient therapy at this time pursue MI/CD if not improving.   UROLOGY        I have been asked to see this patient by Richard Thompson MD for elevated psa.  A copy of this note has been sent to Richard Thompson MD.     I have reviewed the nurse/MA notes and assessment and agree.      UROLOGY CHIEF COMPLAINT   Chief Complaint   Patient presents with   • Elevated Psa   • Follow-up   • Office Visit       UROLOGY HISTORY OF PRESENT ILLNESS    Mr. Pritesh Rivera is a 73 year old male who presents in follow-up for elevated psa and lower urinary tract symptoms related BPH    Patient denies family history of prostate cancer - adopted.  He had stents in 2010 and is on plavix - Dr. Daniel Wilburn    PAST INTERVAL HISTORY / PROBLEM LIST:  12/17 - benign prostate biopsy for PSA of 4.2, 4k = 17 prior to biopsy    Pathologic Diagnosis :    A. - L. Prostate, bilateral, sites as specified, needle core biopsies:    - Benign prostatic tissue with atrophic changes and focal acute and chronic    inflammation.    - No malignancy identified.        Wilver Messer MD    ** Electronic Signature ([facility]) 12/13/2017 13:44 **      CURRENT INTERVAL HISTORY:  7/30/25 -  He denies symptoms of urinary tract infection and urinary retention. He is satisfied from a quality of life standpoint with respect to his voiding habits.  Some slow stream in the morning.    He is bothered by postvoid dribbling.  He remains active.  He does wish to try flomax    Diet-controlled type 2 diabetes     mL    History of present illness per ROSEL[employer] copilot artificial intelligence recording of visit:       The patient is a 73-year-old male who presents for elevated PSA and urinary tract symptoms.    He reports no visible blood in his urine. Over the past 2 years, he has experienced occasional episodes of burning sensation during urination, but these have not been severe. His PSA levels have remained stable over the last few years, and previous MRI and biopsy results have been normal, indicating a low concern for prostate  cancer. He occasionally struggles with initiating urination, but this is not a daily issue. He also experiences difficulty in stopping the flow of urine, which he finds embarrassing, particularly when it occurs outside of his home. He maintains an active lifestyle, including daily walks.          PSA, Total (ng/mL)   Date Value   06/11/2019 3.89     Prostate Specific Antigen (ng/mL)   Date Value   07/16/2025 5.18   05/02/2024 5.72   07/17/2023 5.87   07/20/2022 4.82   07/08/2021 5.17 (H)   07/22/2020 4.02 (H)         PAST MEDICAL HISTORY      Heart contusion without mention of open wound * 09/09/2009    Essential hypertension, benign                  09/09/2009    Mixed hyperlipidemia                            09/09/2009    Coronary atherosclerosis of native coronary ar* 09/09/2009    Esophageal reflux                                             Allergic rhinitis, cause unspecified                          Special screening for malignant neoplasms, col* 02/23/2011      Comment: Colonoscopy/[facility]    COVID-19 virus infection                        12/22/2021    Diverticulosis                                  01/19/2022    PAST SURGICAL HISTORY      REMOVAL OF TONSILS,<10 Y/O                                      Comment: age 4    LEFT HEART CATH,PERCUTANEOUS                    09/2009         Comment: Cardiac Cath with stent placement    LAPAROSCOPIC VASECTOMY                          1989          NM GABRIEL PER RST/STRS PHARMACOLO                  10/29/2009      Comment: Scan normal. NO ischemia. Normal EKG    SPIROMETRY                                      02/22/200*      Comment: normal    COLOREC CANC SCRN,COLONOSCOPY NOT HI RISK       02/23/2011      Comment: Dr. Nelson/Colorectal Screening/recall 2/23/2021    MYOCARDIAL PERFUSION REST OR STRESS MULT ST     08/17/2011      Comment: Normal    NM GABRIEL PER RST/STRS PHARMACOLO                  11/05/2013      Comment: Mild dyspnea    MYOCARDIAL PERFUSION REST OR STRESS  MULT ST     2013      Comment: Dr Wilburn-see report    MYOCARDIAL PERFUSION REST OR STRESS MULT ST     11/15/2017      Comment: see report    CARDIAC CATHETERIZATION                                       COLONOSCOPY DIAGNOSTIC                          2022      Comment: Colon with Nelson 2 TA, and tics 5 yr recall    BLEPHAROPTOSIS REPAIR                           2024    SOCIAL HISTORY  Social History     Tobacco Use   • Smoking status: Former     Current packs/day: 0.00     Types: Cigarettes, Cigars     Quit date: 2008     Years since quittin.2   • Smokeless tobacco: Never   • Tobacco comments:     cigar smoker   Substance Use Topics   • Alcohol use: Yes     Alcohol/week: 0.0 standard drinks of alcohol     Comment: occasional       Sexually Active: Yes             Partners with: Female      FAMILY HISTORY  Family History   Adopted: Yes       MEDICATIONS    Current Outpatient Medications   Medication Sig   • tamsulosin (FLOMAX) 0.4 MG Cap Take 1 capsule by mouth daily after a meal.   • metFORMIN (GLUCOPHAGE-XR) 500 MG 24 hr tablet TAKE 1 TABLET BY MOUTH DAILY WITH BREAKFAST   • amLODIPine (NORVASC) 5 MG tablet TAKE 1 TABLET BY MOUTH DAILY   • Farxiga 10 MG tablet TAKE 1 TABLET DAILY   • enalapril (VASOTEC) 20 MG tablet TAKE 1 TABLET BY MOUTH TWICE DAILY   • carvedilol (COREG) 12.5 MG tablet Take 1 tablet by mouth in the morning and 1 tablet in the evening. Take with meals.   • atorvastatin (LIPITOR) 20 MG tablet Take 1 tablet by mouth daily.   • clopidogrel (PLAVIX) 75 MG tablet Take 1 tablet by mouth daily.   • Accu-Chek Guide test strip TEST BLOOD SUGAR TWICE DAILY   • SOFTCLIX LANCETS Misc Test 2 times daily   • Blood Glucose Monitoring Suppl (Accu-Chek Guide Me) w/Device Kit      No current facility-administered medications for this visit.       ALLERGIES    ALLERGIES:   Allergen Reactions   • Seasonal Other (See Comments)     Congestion and watery eyes       Review of Systems         PHYSICAL EXAM    Vital Signs: Blood pressure 139/77, pulse 67, resp. rate 16, weight 87.1 kg (192 lb 2.1 oz), SpO2 98%.   General: The patient is well developed, well nourished, in no acute distress, appears stated age.   Neurologic: Alert. Normal mood and affect.   Skin: Warm and dry.   Neck: Symmetric without swelling or tenderness.   Respiratory: Respiratory effort normal.       .   DIGITAL RECTAL EXAMINATION:  Prostate smooth and symmetric.  Seminal vesicles nonpalpable.  Sphincter tone normal.      ASSESSMENT  Elevated PSA - normal age-adjusted PSA - negative biopsy 2017  LUTS have become more bothersome and and he does wish to try Flomax  Patient on Plavix  Gross hematuria with UTI with remote history of tobacco use in 2022  PSA density 0.04 based on prostate volume from MRI completed in 2022 - this is very reassuring along with reassuring PSA velocity    PSA, Total (ng/mL)   Date Value   06/11/2019 3.89     Prostate Specific Antigen (ng/mL)   Date Value   07/16/2025 5.18   05/02/2024 5.72   07/17/2023 5.87   07/20/2022 4.82   07/08/2021 5.17 (H)   07/22/2020 4.02 (H)         1. Lower urinary tract symptoms (LUTS)    2. Elevated PSA        EXAM: MRI PROSTATE 3T W WO CONTRAST     DATE OF EXAM: 9/14/2022 7:18 PM.     CLINICAL INFORMATION: 70 years-old male with history of elevated PSA.     PSA VALUES:     7/22/2020: 4.02  7/8/2021: 5.17  7/20/2022: 4.82     TECHNIQUE: Multiparametric technique utilized with large field-of-view  axial T1 and coronal T2, small field-of-view triplanar T2, axial  diffusion-weighted/ADC, and pre-/post contrast axial T1 fat saturated  sequences. Images reviewed on PACS and AI Exchange, with gland segmentation and  lesion analysis as appropriate.     IV CONTRAST: Gadavist 10 mL.     COMPARISON:  None.     FINDINGS:      Prostate Measurements: 5.1 cm transverse, 5.8 cm AP, and 8.4 cm in  craniocaudal dimension. Prostate volume approximately 129 cubic cm.     BPH Changes (ME-2):       Severe.     ----------------------------------------------  Prostate Observations:   No tumor suspicious lesions.  ----------------------------------------------     Other Prostate Findings: Thinning of the peripheral zone, with linear areas  of T2 hyperintense signal suggestive of prostatitis/fibrosis.     Seminal Vesicles:        Unremarkable.  Neurovascular Bundles:   Unremarkable.  Pelvic Adenopathy:       None.     Pelvic Bowel:            Sigmoid diverticulosis.  Urinary Bladder:         Unremarkable.     Other:                   No additional clinically significant findings in  the visualized pelvis.     Osseous Structures:      No suspicious osseous lesion. Right hip paralabral  cysts.     ----------------------------------------------  PI-RADS v2 Assessment Categories     PI-RADS 1 - Very low (clinically significant cancer is highly unlikely to  be present)  PI-RADS 2 - Low (clinically significant cancer is unlikely to be present)  PI-RADS 3 - Intermediate (the presence of clinically significant cancer is  equivocal)  PI-RADS 4 - High (clinically significant cancer is likely to be present)  PI-RADS 5 - Very high (clinically significant cancer is highly likely to be  present)  ----------------------------------------------        IMPRESSION:     1. No tumor suspicious lesions in the prostate gland.  2. Severe BPH.     Index PI-RADS Category:    PLAN  Patient info flomax and BPH  Follow-up 2 months with repeat bladder scan PVR since PVR elevated to 250 mL today  Start Flomax 0.4 mg p.o. daily      Patient is a very nice man with reassuring PSA velocity and PSA density with prostate volume in 2017 of 100 cc and 124 in 2022 with normal prostate MRI in 2022.  He did have episode of gross hematuria related to staph UTI in 2022.  We discussed options for further evaluation and treatment including formal hematuria workup with CT urogram and cystoscopy.  We discussed options of pharmacologic therapy or surgical  therapy for BPH       Assessment and plan per ROSEL[employer] copilot artificial intelligence recording of visit:       1. Elevated PSA.  Prostate-specific antigen (PSA) levels have remained consistent over the past few years, which is reassuring and suggests a low risk of prostate cancer. A normal MRI from 3 years ago and a normal biopsy from a long time ago further support this assessment. Current guidelines indicate that a rectal examination is not mandatory in this case. A blood test will be conducted in a year to monitor PSA levels.    2. Urinary tract symptoms.  Occasional difficulty starting urination and dribbling afterward were reported, which can be embarrassing, especially when out in public. No blood in the urine was noted. The potential side effects of Flomax, including lightheadedness, floppy iris syndrome, and interference with ejaculation, were discussed. It was advised to inform the ophthalmologist about the Flomax prescription if undergoing cataract surgery. Symptoms should be used as a guide for medication use; if they become bothersome, medication should be taken, otherwise, it can be chosen not to. Flomax will be started to see if it improves symptoms over the next month. If symptoms worsen, medications such as finasteride can be considered to shrink the prostate. Surgery is not recommended as the first option.